# Patient Record
Sex: FEMALE | Race: WHITE | NOT HISPANIC OR LATINO
[De-identification: names, ages, dates, MRNs, and addresses within clinical notes are randomized per-mention and may not be internally consistent; named-entity substitution may affect disease eponyms.]

---

## 2021-09-13 ENCOUNTER — APPOINTMENT (OUTPATIENT)
Dept: OBGYN | Facility: CLINIC | Age: 61
End: 2021-09-13

## 2021-09-24 ENCOUNTER — INPATIENT (INPATIENT)
Facility: HOSPITAL | Age: 61
LOS: 10 days | Discharge: HOME | DRG: 442 | End: 2021-10-05
Attending: INTERNAL MEDICINE | Admitting: INTERNAL MEDICINE
Payer: MEDICARE

## 2021-09-24 VITALS
DIASTOLIC BLOOD PRESSURE: 87 MMHG | SYSTOLIC BLOOD PRESSURE: 128 MMHG | HEART RATE: 104 BPM | HEIGHT: 62 IN | WEIGHT: 147.05 LBS | OXYGEN SATURATION: 97 % | TEMPERATURE: 99 F | RESPIRATION RATE: 20 BRPM

## 2021-09-24 DIAGNOSIS — R74.8 ABNORMAL LEVELS OF OTHER SERUM ENZYMES: ICD-10-CM

## 2021-09-24 LAB
ALBUMIN SERPL ELPH-MCNC: 3.6 G/DL — SIGNIFICANT CHANGE UP (ref 3.3–5)
ALP SERPL-CCNC: 431 U/L — HIGH (ref 40–120)
ALT FLD-CCNC: 1574 U/L — HIGH (ref 10–45)
ANION GAP SERPL CALC-SCNC: 16 MMOL/L — SIGNIFICANT CHANGE UP (ref 5–17)
APAP SERPL-MCNC: <15 UG/ML — SIGNIFICANT CHANGE UP (ref 10–30)
APPEARANCE UR: ABNORMAL
AST SERPL-CCNC: 2330 U/L — HIGH (ref 10–40)
BACTERIA # UR AUTO: NEGATIVE — SIGNIFICANT CHANGE UP
BASOPHILS # BLD AUTO: 0.1 K/UL — SIGNIFICANT CHANGE UP (ref 0–0.2)
BASOPHILS NFR BLD AUTO: 1.2 % — SIGNIFICANT CHANGE UP (ref 0–2)
BILIRUB DIRECT SERPL-MCNC: 6.1 MG/DL — HIGH (ref 0–0.2)
BILIRUB INDIRECT FLD-MCNC: 2.5 MG/DL — HIGH (ref 0.2–1)
BILIRUB SERPL-MCNC: 8.6 MG/DL — HIGH (ref 0.2–1.2)
BILIRUB SERPL-MCNC: 9.3 MG/DL — HIGH (ref 0.2–1.2)
BILIRUB UR-MCNC: ABNORMAL
BUN SERPL-MCNC: 5 MG/DL — LOW (ref 7–23)
CALCIUM SERPL-MCNC: 8.7 MG/DL — SIGNIFICANT CHANGE UP (ref 8.4–10.5)
CHLORIDE SERPL-SCNC: 96 MMOL/L — SIGNIFICANT CHANGE UP (ref 96–108)
CO2 SERPL-SCNC: 21 MMOL/L — LOW (ref 22–31)
COLOR SPEC: ABNORMAL
CREAT SERPL-MCNC: 0.56 MG/DL — SIGNIFICANT CHANGE UP (ref 0.5–1.3)
DIFF PNL FLD: NEGATIVE — SIGNIFICANT CHANGE UP
EOSINOPHIL # BLD AUTO: 0.13 K/UL — SIGNIFICANT CHANGE UP (ref 0–0.5)
EOSINOPHIL NFR BLD AUTO: 1.6 % — SIGNIFICANT CHANGE UP (ref 0–6)
EPI CELLS # UR: 1 /HPF — SIGNIFICANT CHANGE UP
GLUCOSE SERPL-MCNC: 89 MG/DL — SIGNIFICANT CHANGE UP (ref 70–99)
GLUCOSE UR QL: NEGATIVE — SIGNIFICANT CHANGE UP
HCT VFR BLD CALC: 42.1 % — SIGNIFICANT CHANGE UP (ref 34.5–45)
HGB BLD-MCNC: 14.9 G/DL — SIGNIFICANT CHANGE UP (ref 11.5–15.5)
HYALINE CASTS # UR AUTO: 0 /LPF — SIGNIFICANT CHANGE UP (ref 0–2)
IMM GRANULOCYTES NFR BLD AUTO: 0.4 % — SIGNIFICANT CHANGE UP (ref 0–1.5)
KETONES UR-MCNC: NEGATIVE — SIGNIFICANT CHANGE UP
LEUKOCYTE ESTERASE UR-ACNC: NEGATIVE — SIGNIFICANT CHANGE UP
LYMPHOCYTES # BLD AUTO: 1.8 K/UL — SIGNIFICANT CHANGE UP (ref 1–3.3)
LYMPHOCYTES # BLD AUTO: 22.2 % — SIGNIFICANT CHANGE UP (ref 13–44)
MCHC RBC-ENTMCNC: 29.7 PG — SIGNIFICANT CHANGE UP (ref 27–34)
MCHC RBC-ENTMCNC: 35.4 GM/DL — SIGNIFICANT CHANGE UP (ref 32–36)
MCV RBC AUTO: 83.9 FL — SIGNIFICANT CHANGE UP (ref 80–100)
MONOCYTES # BLD AUTO: 0.74 K/UL — SIGNIFICANT CHANGE UP (ref 0–0.9)
MONOCYTES NFR BLD AUTO: 9.1 % — SIGNIFICANT CHANGE UP (ref 2–14)
NEUTROPHILS # BLD AUTO: 5.32 K/UL — SIGNIFICANT CHANGE UP (ref 1.8–7.4)
NEUTROPHILS NFR BLD AUTO: 65.5 % — SIGNIFICANT CHANGE UP (ref 43–77)
NITRITE UR-MCNC: NEGATIVE — SIGNIFICANT CHANGE UP
NRBC # BLD: 0 /100 WBCS — SIGNIFICANT CHANGE UP (ref 0–0)
PH UR: 6.5 — SIGNIFICANT CHANGE UP (ref 5–8)
PLATELET # BLD AUTO: 209 K/UL — SIGNIFICANT CHANGE UP (ref 150–400)
POTASSIUM SERPL-MCNC: 3 MMOL/L — LOW (ref 3.5–5.3)
POTASSIUM SERPL-SCNC: 3 MMOL/L — LOW (ref 3.5–5.3)
PROT SERPL-MCNC: 7.4 G/DL — SIGNIFICANT CHANGE UP (ref 6–8.3)
PROT UR-MCNC: ABNORMAL
RBC # BLD: 5.02 M/UL — SIGNIFICANT CHANGE UP (ref 3.8–5.2)
RBC # FLD: 18.4 % — HIGH (ref 10.3–14.5)
RBC CASTS # UR COMP ASSIST: 16 /HPF — HIGH (ref 0–4)
SARS-COV-2 RNA SPEC QL NAA+PROBE: SIGNIFICANT CHANGE UP
SODIUM SERPL-SCNC: 133 MMOL/L — LOW (ref 135–145)
SP GR SPEC: 1.01 — SIGNIFICANT CHANGE UP (ref 1.01–1.02)
UROBILINOGEN FLD QL: NEGATIVE — SIGNIFICANT CHANGE UP
WBC # BLD: 8.12 K/UL — SIGNIFICANT CHANGE UP (ref 3.8–10.5)
WBC # FLD AUTO: 8.12 K/UL — SIGNIFICANT CHANGE UP (ref 3.8–10.5)
WBC UR QL: 2 /HPF — SIGNIFICANT CHANGE UP (ref 0–5)

## 2021-09-24 PROCEDURE — 74176 CT ABD & PELVIS W/O CONTRAST: CPT | Mod: 26,MA

## 2021-09-24 RX ORDER — POTASSIUM CHLORIDE 20 MEQ
40 PACKET (EA) ORAL ONCE
Refills: 0 | Status: COMPLETED | OUTPATIENT
Start: 2021-09-24 | End: 2021-09-24

## 2021-09-24 RX ORDER — LEVOTHYROXINE SODIUM 125 MCG
25 TABLET ORAL DAILY
Refills: 0 | Status: DISCONTINUED | OUTPATIENT
Start: 2021-09-24 | End: 2021-10-05

## 2021-09-24 RX ADMIN — Medication 40 MILLIEQUIVALENT(S): at 17:03

## 2021-09-24 NOTE — H&P ADULT - HISTORY OF PRESENT ILLNESS
60F with PMH of chronic lower back pain 2/2 degenerative changes, chronic smoker, h/o COPD, noncompliant with treatment, had a CT C/A/P done in 4/2021 2/2 chronic pulmonary nodules and right adrenal adenoma monitoring.  ptn states for the past 2 weeks her urine is brown, she feels nauseous, has diffuse abdominal and back pain, denies dysuria. states she has fevers but hasnt checked her temps. states she has chills  One episode of NBNB emesis 2 weeks ago. Reports constipation with occasional soft stools. never had a colonoscopy, refused COVID vaccine, has a covid swab today at an urgi center: negative. Denies taking Tylenol.    Denies chest pain, leg swelling or pain.

## 2021-09-24 NOTE — ED PROVIDER NOTE - ATTENDING CONTRIBUTION TO CARE
pt is a 61 y/o female here for intermittent back pains with h/o herniated disc here with radiation of pain to rlq, intermittent not related to movement with dark urine, ua nl at urgent care, no n/v/d/f/uti sts, ct ordered, labs, ua, benign abd at this time.

## 2021-09-24 NOTE — ED ADULT NURSE NOTE - OBJECTIVE STATEMENT
59y/o female aaox4 h/o emphysema, HLD, hypothyroidism  presents to the ED ambulatory w/ cane  from home c/o brown urine, fever, b/l flank pain  . Pt states that about 2 week started w/ b/l flank pain  w/radiation to the b/l groins, pt thought that she has UTI and treat herself at home, but the pain , intermittent brown urine ( no blood) and, fever, made her go to  today  for evaluations, where tested urine w/negative results and rcc pt to came to ED for further evaluations, Pt reports having fever, . Denies painful/burning urinations, n/v, weakness, abd pain, diarrhea/constipation, numbness/tingling, in no respiratory distress, no CP, Safety and comfort measures initiated- bed placed in lowest position and side rails raised. Pt oriented to call bell system.

## 2021-09-24 NOTE — ED ADULT NURSE REASSESSMENT NOTE - NS ED NURSE REASSESS COMMENT FT1
Received report from JORDYN Sanchez. Pt is awake and alert, resting comfortably in stretcher, speaking in full coherent sentences. Pt denies CP, SOB, fevers, chills, N/V/D, HA, vision changes. Pt provided with food & drink. Pt completed MRI paperwork, completed forms given to [employer]. Comfort & safety provided.

## 2021-09-24 NOTE — ED ADULT NURSE NOTE - NSIMPLEMENTINTERV_GEN_ALL_ED
Implemented All Fall Risk Interventions:  [employer] to call system. Call bell, personal items and telephone within reach. Instruct patient to call for assistance. Room bathroom lighting operational. Non-slip footwear when patient is off stretcher. Physically safe environment: no spills, clutter or unnecessary equipment. Stretcher in lowest position, wheels locked, appropriate side rails in place. Provide visual cue, wrist band, yellow gown, etc. Monitor gait and stability. Monitor for mental status changes and reorient to person, place, and time. Review medications for side effects contributing to fall risk. Reinforce activity limits and safety measures with patient and family.

## 2021-09-24 NOTE — H&P ADULT - NSHPPHYSICALEXAM_GEN_ALL_CORE
T(F): 99.1 (09-24-21 @ 14:29), Max: 99.1 (09-24-21 @ 12:53)  HR: 84 (09-24-21 @ 17:56) (80 - 104)  BP: 132/80 (09-24-21 @ 17:56) (125/83 - 132/80)  RR: 16 (09-24-21 @ 17:56) (16 - 20)  SpO2: 100% (09-24-21 @ 17:56) (97% - 100%)    PHYSICAL EXAM:  GENERAL: NAD, well-developed  HEAD:  Atraumatic, Normocephalic  EYES: EOMI, PERRLA, conjunctiva and sclera clear  NECK: Supple, No JVD  CHEST/LUNG: Clear to auscultation bilaterally; No wheeze  HEART: Regular rate and rhythm; No murmurs, rubs, or gallops  ABDOMEN: Soft, Nontender, Nondistended; Bowel sounds present  EXTREMITIES:  2+ Peripheral Pulses, No clubbing, cyanosis, or edema  PSYCH: AAOx3  NEUROLOGY: non-focal  SKIN: No rashes or lesions

## 2021-09-24 NOTE — ED PROVIDER NOTE - NS ED ROS FT
REVIEW OF SYSTEMS:  CONSTITUTIONAL: No weakness, fevers or chills  EYES/ENT: No visual changes;  No vertigo or throat pain   NECK: No pain or stiffness  RESPIRATORY: No cough, wheezing, +intermittent shortness of breath  CARDIOVASCULAR: No chest pain or palpitations  GASTROINTESTINAL: +lower abdominal pain. No epigastric pain. +nausea, vomiting, no hematemesis; +constipation. No melena or hematochezia.  GENITOURINARY: +dark urine, No dysuria, frequency or hematuria  NEUROLOGICAL: No numbness or weakness  ENDOCRINE: No polyuria, polydipsia, heat or cold intolerance  SKIN: No itching, rashes

## 2021-09-24 NOTE — H&P ADULT - NSICDXPASTMEDICALHX_GEN_ALL_CORE_FT
PAST MEDICAL HISTORY:  Adrenal adenoma     COPD not affecting current episode of care     DJD (degenerative joint disease)     Hypothyroidism     Low back pain     Nicotine addiction

## 2021-09-24 NOTE — ED PROVIDER NOTE - OBJECTIVE STATEMENT
Patient is a 60F with PMH of chronic lower back pain 2/2 degenerative changes, now presenting to the ED with a 2 week history of bilateral lower back pain radiating to the groin as well and brown colored urine. She reports chronic bilateral lower back pain that radiates to groin bilaterally, not exacerbated by stretching/movement. Reports 2 week history of brown colored urine with brown/yellow color noted on toilet paper. Reports bilateral lower abdominal pain. Reports occasional one drop of blood in urine. One episode of NBNB emesis 2 weeks ago. Reports constipation with occasional soft stools. Denies dysuria. Denies fever, chills, chest pain, leg swelling or pain.

## 2021-09-24 NOTE — H&P ADULT - ASSESSMENT
61 yo woman with c/o painless jaundice, has chronic LBP  work up c/w hepatocellular dz with acute hepatitis of unclear etiology  ct A/P c/w cirrhosis, but nl platelets  get mrcp , get PT/INR  will also MRI LS spine  place on Neuronitn   dvt ppx w sc Lovenox

## 2021-09-24 NOTE — PROGRESS NOTE ADULT - SUBJECTIVE AND OBJECTIVE BOX
RIC [id number]  60yFemale  T(C): 37.3 (09-24-21 @ 14:29), Max: 37.3 (09-24-21 @ 12:53)  HR: 80 (09-24-21 @ 17:19) (80 - 104)  BP: 125/83 (09-24-21 @ 17:19) (125/83 - 128/89)  RR: 18 (09-24-21 @ 17:19) (18 - 20)  SpO2: 100% (09-24-21 @ 17:19) (97% - 100%)  Wt(kg): --

## 2021-09-24 NOTE — ED PROVIDER NOTE - PHYSICAL EXAMINATION
T(C): 37.3 (09-24-21 @ 14:29), Max: 37.3 (09-24-21 @ 12:53)  HR: 88 (09-24-21 @ 14:29) (88 - 104)  BP: 128/89 (09-24-21 @ 14:29) (128/87 - 128/89)  RR: 18 (09-24-21 @ 14:29) (18 - 20)  SpO2: 100% (09-24-21 @ 14:29) (97% - 100%)    PHYSICAL EXAM:  GENERAL: NAD, resting in bed  HEAD:  Atraumatic, Normocephalic  EYES: EOMI, PERRLA, conjunctiva and sclera clear  ENMT: No tonsillar erythema, exudates, or enlargement; Moist mucous membranes, Good dentition, No lesions  NECK: Supple, No JVD  CHEST/LUNG: Clear to auscultation bilaterally; No rales, rhonchi, wheezing  HEART: Regular rate and rhythm; No murmurs, rubs, or gallops  ABDOMEN: Soft, +moderate tenderness to palpation lower abdomen b/l, Nondistended; Bowel sounds present  EXTREMITIES:  2+ Peripheral Pulses, No clubbing, cyanosis, or edema  LYMPH: No lymphadenopathy noted  SKIN: No rashes or lesions  NERVOUS SYSTEM:  Alert & Oriented X3, No focal deficits.

## 2021-09-25 LAB
ALBUMIN SERPL ELPH-MCNC: 3.1 G/DL — LOW (ref 3.3–5)
ALP SERPL-CCNC: 386 U/L — HIGH (ref 40–120)
ALT FLD-CCNC: 1393 U/L — HIGH (ref 10–45)
ANION GAP SERPL CALC-SCNC: 13 MMOL/L — SIGNIFICANT CHANGE UP (ref 5–17)
AST SERPL-CCNC: 2309 U/L — HIGH (ref 10–40)
BILIRUB DIRECT SERPL-MCNC: 6.6 MG/DL — HIGH (ref 0–0.2)
BILIRUB INDIRECT FLD-MCNC: 2.2 MG/DL — HIGH (ref 0.2–1)
BILIRUB SERPL-MCNC: 8.8 MG/DL — HIGH (ref 0.2–1.2)
BUN SERPL-MCNC: 7 MG/DL — SIGNIFICANT CHANGE UP (ref 7–23)
CALCIUM SERPL-MCNC: 8.7 MG/DL — SIGNIFICANT CHANGE UP (ref 8.4–10.5)
CHLORIDE SERPL-SCNC: 101 MMOL/L — SIGNIFICANT CHANGE UP (ref 96–108)
CO2 SERPL-SCNC: 21 MMOL/L — LOW (ref 22–31)
CREAT SERPL-MCNC: 0.57 MG/DL — SIGNIFICANT CHANGE UP (ref 0.5–1.3)
CULTURE RESULTS: SIGNIFICANT CHANGE UP
GLUCOSE SERPL-MCNC: 67 MG/DL — LOW (ref 70–99)
HAV IGM SER-ACNC: SIGNIFICANT CHANGE UP
HBV CORE IGM SER-ACNC: SIGNIFICANT CHANGE UP
HBV SURFACE AG SER-ACNC: SIGNIFICANT CHANGE UP
HCT VFR BLD CALC: 39 % — SIGNIFICANT CHANGE UP (ref 34.5–45)
HCV AB S/CO SERPL IA: 0.21 S/CO — SIGNIFICANT CHANGE UP (ref 0–0.99)
HCV AB SERPL-IMP: SIGNIFICANT CHANGE UP
HGB BLD-MCNC: 13.2 G/DL — SIGNIFICANT CHANGE UP (ref 11.5–15.5)
INR BLD: 1.21 RATIO — HIGH (ref 0.88–1.16)
MCHC RBC-ENTMCNC: 28.9 PG — SIGNIFICANT CHANGE UP (ref 27–34)
MCHC RBC-ENTMCNC: 33.8 GM/DL — SIGNIFICANT CHANGE UP (ref 32–36)
MCV RBC AUTO: 85.3 FL — SIGNIFICANT CHANGE UP (ref 80–100)
NRBC # BLD: 0 /100 WBCS — SIGNIFICANT CHANGE UP (ref 0–0)
PLATELET # BLD AUTO: 192 K/UL — SIGNIFICANT CHANGE UP (ref 150–400)
POTASSIUM SERPL-MCNC: 3.5 MMOL/L — SIGNIFICANT CHANGE UP (ref 3.5–5.3)
POTASSIUM SERPL-SCNC: 3.5 MMOL/L — SIGNIFICANT CHANGE UP (ref 3.5–5.3)
PROT SERPL-MCNC: 6.4 G/DL — SIGNIFICANT CHANGE UP (ref 6–8.3)
PROTHROM AB SERPL-ACNC: 14.4 SEC — HIGH (ref 10.6–13.6)
RBC # BLD: 4.57 M/UL — SIGNIFICANT CHANGE UP (ref 3.8–5.2)
RBC # FLD: 18.6 % — HIGH (ref 10.3–14.5)
SODIUM SERPL-SCNC: 135 MMOL/L — SIGNIFICANT CHANGE UP (ref 135–145)
SPECIMEN SOURCE: SIGNIFICANT CHANGE UP
WBC # BLD: 6.33 K/UL — SIGNIFICANT CHANGE UP (ref 3.8–10.5)
WBC # FLD AUTO: 6.33 K/UL — SIGNIFICANT CHANGE UP (ref 3.8–10.5)

## 2021-09-25 PROCEDURE — 74181 MRI ABDOMEN W/O CONTRAST: CPT | Mod: 26

## 2021-09-25 RX ORDER — CHLORHEXIDINE GLUCONATE 213 G/1000ML
1 SOLUTION TOPICAL DAILY
Refills: 0 | Status: DISCONTINUED | OUTPATIENT
Start: 2021-09-25 | End: 2021-10-05

## 2021-09-25 RX ORDER — INFLUENZA VIRUS VACCINE 15; 15; 15; 15 UG/.5ML; UG/.5ML; UG/.5ML; UG/.5ML
0.5 SUSPENSION INTRAMUSCULAR ONCE
Refills: 0 | Status: DISCONTINUED | OUTPATIENT
Start: 2021-09-25 | End: 2021-10-05

## 2021-09-25 RX ORDER — GABAPENTIN 400 MG/1
100 CAPSULE ORAL THREE TIMES A DAY
Refills: 0 | Status: DISCONTINUED | OUTPATIENT
Start: 2021-09-25 | End: 2021-10-01

## 2021-09-25 RX ORDER — ALPRAZOLAM 0.25 MG
0.25 TABLET ORAL ONCE
Refills: 0 | Status: DISCONTINUED | OUTPATIENT
Start: 2021-09-25 | End: 2021-09-25

## 2021-09-25 RX ORDER — ENOXAPARIN SODIUM 100 MG/ML
40 INJECTION SUBCUTANEOUS DAILY
Refills: 0 | Status: DISCONTINUED | OUTPATIENT
Start: 2021-09-25 | End: 2021-09-27

## 2021-09-25 RX ADMIN — GABAPENTIN 100 MILLIGRAM(S): 400 CAPSULE ORAL at 21:21

## 2021-09-25 RX ADMIN — Medication 25 MICROGRAM(S): at 05:19

## 2021-09-25 RX ADMIN — CHLORHEXIDINE GLUCONATE 1 APPLICATION(S): 213 SOLUTION TOPICAL at 13:58

## 2021-09-25 RX ADMIN — Medication 0.25 MILLIGRAM(S): at 11:27

## 2021-09-25 NOTE — PROGRESS NOTE ADULT - SUBJECTIVE AND OBJECTIVE BOX
Patient is a 60y old  Female who presents with a chief complaint of hepatitis, jaundice (25 Sep 2021 09:35)      SUBJECTIVE / OVERNIGHT EVENTS: ptn remains jaundiced, serology for autoimmune hepatitis sent, denies pruritis, c/o constipation. GI following    MEDICATIONS  (STANDING):  chlorhexidine 2% Cloths 1 Application(s) Topical daily  influenza   Vaccine 0.5 milliLiter(s) IntraMuscular once  levothyroxine 25 MICROGram(s) Oral daily    MEDICATIONS  (PRN):      Vital Signs Last 24 Hrs  T(F): 97.4 (21 @ 14:03), Max: 99.5 (21 @ 00:28)  HR: 85 (21 @ 14:03) (77 - 85)  BP: 112/79 (21 @ 14:03) (99/62 - 112/79)  RR: 20 (21 @ 14:03) (18 - 20)  SpO2: 98% (21 @ 14:03) (94% - 98%)  Telemetry:   CAPILLARY BLOOD GLUCOSE        I&O's Summary      PHYSICAL EXAM:  GENERAL: NAD, well-developed  HEAD:  Atraumatic, Normocephalic  EYES: EOMI, PERRLA, conjunctiva and sclera clear  NECK: Supple, No JVD  CHEST/LUNG: Clear to auscultation bilaterally; No wheeze  HEART: Regular rate and rhythm; No murmurs, rubs, or gallops  ABDOMEN: Soft, Nontender, Nondistended; Bowel sounds present  EXTREMITIES:  2+ Peripheral Pulses, No clubbing, cyanosis, or edema  PSYCH: AAOx3  NEUROLOGY: non-focal  SKIN: No rashes or lesions    LABS:                        13.2   6.33  )-----------( 192      ( 25 Sep 2021 07:16 )             39.0         135  |  101  |  7   ----------------------------<  67<L>  3.5   |  21<L>  |  0.57    Ca    8.7      25 Sep 2021 07:16    TPro  6.4  /  Alb  3.1<L>  /  TBili  8.8<H>  /  DBili  6.6<H>  /  AST  2309<H>  /  ALT  1393<H>  /  AlkPhos  386<H>      PT/INR - ( 25 Sep 2021 07:16 )   PT: 14.4 sec;   INR: 1.21 ratio               Urinalysis Basic - ( 24 Sep 2021 15:23 )    Color: Dark Yellow / Appearance: Slightly Turbid / S.011 / pH: x  Gluc: x / Ketone: Negative  / Bili: Small / Urobili: Negative   Blood: x / Protein: Trace / Nitrite: Negative   Leuk Esterase: Negative / RBC: 16 /hpf / WBC 2 /HPF   Sq Epi: x / Non Sq Epi: 1 /hpf / Bacteria: Negative        RADIOLOGY & ADDITIONAL TESTS:    Imaging Personally Reviewed:    Consultant(s) Notes Reviewed:      Care Discussed with Consultants/Other Providers:

## 2021-09-25 NOTE — PROGRESS NOTE ADULT - SUBJECTIVE AND OBJECTIVE BOX
Patient is a 60y Female     Patient is a 60y old  Female who presents with a chief complaint of hepatitis, jaundice (24 Sep 2021 18:33)      HPI:   60F with PMH of chronic lower back pain 2/2 degenerative changes, chronic smoker, h/o COPD, noncompliant with treatment, had a CT C/A/P done in 4/2021 2/2 chronic pulmonary nodules and right adrenal adenoma monitoring.  ptn states for the past 2 weeks her urine is brown, she feels nauseous, has diffuse abdominal and back pain, denies dysuria. states she has fevers but hasnt checked her temps. states she has chills  One episode of NBNB emesis 2 weeks ago. Reports constipation with occasional soft stools. never had a colonoscopy, refused COVID vaccine, [facility]. Denies taking Tylenol.    Denies chest pain, leg swelling or pain. (24 Sep 2021 18:33)      PAST MEDICAL & SURGICAL HISTORY:  Hypothyroidism    COPD not affecting current episode of care    Adrenal adenoma    Low back pain    DJD (degenerative joint disease)    Nicotine addiction        MEDICATIONS  (STANDING):  ALPRAZolam 0.25 milliGRAM(s) Oral once  chlorhexidine 2% Cloths 1 Application(s) Topical daily  influenza   Vaccine 0.5 milliLiter(s) IntraMuscular once  levothyroxine 25 MICROGram(s) Oral daily      Allergies    penicillin (Rash)    Intolerances        SOCIAL HISTORY:  Denies ETOh,Smoking,     FAMILY HISTORY:      REVIEW OF SYSTEMS:    CONSTITUTIONAL: No weakness, fevers or chills  EYES/ENT: No visual changes;  No vertigo or throat pain   NECK: No pain or stiffness  RESPIRATORY: No cough, wheezing, hemoptysis; No shortness of breath  CARDIOVASCULAR: No chest pain or palpitations  GASTROINTESTINAL: No abdominal or epigastric pain. No nausea, vomiting, or hematemesis; No diarrhea or constipation. No melena or hematochezia.  GENITOURINARY: No dysuria, frequency or hematuria  NEUROLOGICAL: No numbness or weakness  SKIN: No itching, burning, rashes, or lesions   All other review of systems is negative unless indicated above.    VITAL:  T(C): , Max: 37.5 (09-25-21 @ 00:28)  T(F): , Max: 99.5 (09-25-21 @ 00:28)  HR: 77 (09-25-21 @ 08:34)  BP: 107/69 (09-25-21 @ 08:34)  BP(mean): --  RR: 18 (09-25-21 @ 08:34)  SpO2: 94% (09-25-21 @ 08:34)  Wt(kg): --    I and O's:    Height (cm): 157.5 (09-24 @ 12:53)  Weight (kg): 66.7 (09-24 @ 12:53)  BMI (kg/m2): 26.9 (09-24 @ 12:53)  BSA (m2): 1.68 (09-24 @ 12:53)    PHYSICAL EXAM:    Constitutional: NAD  HEENT: PERRLA,   Neck: No JVD  Respiratory: CTA B/L  Cardiovascular: S1 and S2  Gastrointestinal: BS+, soft, NT/ND  Extremities: No peripheral edema  Neurological: A/O x 3, no focal deficits  Psychiatric: Normal mood, normal affect  : No Cee  Skin: No rashes  Access: Not applicable  Back: No CVA tenderness    LABS:                        13.2   6.33  )-----------( 192      ( 25 Sep 2021 07:16 )             39.0     09-25    135  |  101  |  7   ----------------------------<  67<L>  3.5   |  21<L>  |  0.57    Ca    8.7      25 Sep 2021 07:16    TPro  6.4  /  Alb  3.1<L>  /  TBili  8.8<H>  /  DBili  6.6<H>  /  AST  2309<H>  /  ALT  1393<H>  /  AlkPhos  386<H>  09-25          RADIOLOGY & ADDITIONAL STUDIES:

## 2021-09-26 LAB
ALBUMIN SERPL ELPH-MCNC: 2.9 G/DL — LOW (ref 3.3–5)
ALP SERPL-CCNC: 395 U/L — HIGH (ref 40–120)
ALT FLD-CCNC: 1422 U/L — HIGH (ref 10–45)
AMMONIA BLD-MCNC: 49 UMOL/L — SIGNIFICANT CHANGE UP (ref 11–55)
ANA PAT FLD IF-IMP: ABNORMAL
ANA TITR SER: ABNORMAL
ANION GAP SERPL CALC-SCNC: 13 MMOL/L — SIGNIFICANT CHANGE UP (ref 5–17)
AST SERPL-CCNC: 2349 U/L — HIGH (ref 10–40)
BILIRUB SERPL-MCNC: 9.1 MG/DL — HIGH (ref 0.2–1.2)
BUN SERPL-MCNC: 8 MG/DL — SIGNIFICANT CHANGE UP (ref 7–23)
CALCIUM SERPL-MCNC: 8.6 MG/DL — SIGNIFICANT CHANGE UP (ref 8.4–10.5)
CHLORIDE SERPL-SCNC: 102 MMOL/L — SIGNIFICANT CHANGE UP (ref 96–108)
CO2 SERPL-SCNC: 22 MMOL/L — SIGNIFICANT CHANGE UP (ref 22–31)
COVID-19 SPIKE DOMAIN AB INTERP: NEGATIVE — SIGNIFICANT CHANGE UP
COVID-19 SPIKE DOMAIN ANTIBODY RESULT: 0.4 U/ML — SIGNIFICANT CHANGE UP
CREAT SERPL-MCNC: 0.65 MG/DL — SIGNIFICANT CHANGE UP (ref 0.5–1.3)
GLUCOSE SERPL-MCNC: 82 MG/DL — SIGNIFICANT CHANGE UP (ref 70–99)
HCT VFR BLD CALC: 39.8 % — SIGNIFICANT CHANGE UP (ref 34.5–45)
HCV AB S/CO SERPL IA: 0.21 S/CO — SIGNIFICANT CHANGE UP (ref 0–0.99)
HCV AB SERPL-IMP: SIGNIFICANT CHANGE UP
HGB BLD-MCNC: 13.2 G/DL — SIGNIFICANT CHANGE UP (ref 11.5–15.5)
MCHC RBC-ENTMCNC: 28.8 PG — SIGNIFICANT CHANGE UP (ref 27–34)
MCHC RBC-ENTMCNC: 33.2 GM/DL — SIGNIFICANT CHANGE UP (ref 32–36)
MCV RBC AUTO: 86.9 FL — SIGNIFICANT CHANGE UP (ref 80–100)
NRBC # BLD: 0 /100 WBCS — SIGNIFICANT CHANGE UP (ref 0–0)
PLATELET # BLD AUTO: 200 K/UL — SIGNIFICANT CHANGE UP (ref 150–400)
POTASSIUM SERPL-MCNC: 3.8 MMOL/L — SIGNIFICANT CHANGE UP (ref 3.5–5.3)
POTASSIUM SERPL-SCNC: 3.8 MMOL/L — SIGNIFICANT CHANGE UP (ref 3.5–5.3)
PROT SERPL-MCNC: 6.3 G/DL — SIGNIFICANT CHANGE UP (ref 6–8.3)
RBC # BLD: 4.58 M/UL — SIGNIFICANT CHANGE UP (ref 3.8–5.2)
RBC # FLD: 19.2 % — HIGH (ref 10.3–14.5)
SARS-COV-2 IGG+IGM SERPL QL IA: 0.4 U/ML — SIGNIFICANT CHANGE UP
SARS-COV-2 IGG+IGM SERPL QL IA: NEGATIVE — SIGNIFICANT CHANGE UP
SODIUM SERPL-SCNC: 137 MMOL/L — SIGNIFICANT CHANGE UP (ref 135–145)
WBC # BLD: 6.36 K/UL — SIGNIFICANT CHANGE UP (ref 3.8–10.5)
WBC # FLD AUTO: 6.36 K/UL — SIGNIFICANT CHANGE UP (ref 3.8–10.5)

## 2021-09-26 PROCEDURE — 72148 MRI LUMBAR SPINE W/O DYE: CPT | Mod: 26

## 2021-09-26 RX ORDER — LACTULOSE 10 G/15ML
30 SOLUTION ORAL EVERY 8 HOURS
Refills: 0 | Status: DISCONTINUED | OUTPATIENT
Start: 2021-09-26 | End: 2021-10-01

## 2021-09-26 RX ORDER — ALPRAZOLAM 0.25 MG
0.25 TABLET ORAL ONCE
Refills: 0 | Status: DISCONTINUED | OUTPATIENT
Start: 2021-09-26 | End: 2021-09-26

## 2021-09-26 RX ORDER — SENNA PLUS 8.6 MG/1
2 TABLET ORAL AT BEDTIME
Refills: 0 | Status: DISCONTINUED | OUTPATIENT
Start: 2021-09-26 | End: 2021-10-05

## 2021-09-26 RX ORDER — POLYETHYLENE GLYCOL 3350 17 G/17G
17 POWDER, FOR SOLUTION ORAL ONCE
Refills: 0 | Status: COMPLETED | OUTPATIENT
Start: 2021-09-26 | End: 2021-09-26

## 2021-09-26 RX ORDER — POLYETHYLENE GLYCOL 3350 17 G/17G
17 POWDER, FOR SOLUTION ORAL DAILY
Refills: 0 | Status: DISCONTINUED | OUTPATIENT
Start: 2021-09-26 | End: 2021-10-05

## 2021-09-26 RX ADMIN — POLYETHYLENE GLYCOL 3350 17 GRAM(S): 17 POWDER, FOR SOLUTION ORAL at 05:45

## 2021-09-26 RX ADMIN — POLYETHYLENE GLYCOL 3350 17 GRAM(S): 17 POWDER, FOR SOLUTION ORAL at 13:35

## 2021-09-26 RX ADMIN — Medication 0.25 MILLIGRAM(S): at 18:15

## 2021-09-26 RX ADMIN — Medication 25 MICROGRAM(S): at 05:25

## 2021-09-26 RX ADMIN — SENNA PLUS 2 TABLET(S): 8.6 TABLET ORAL at 21:31

## 2021-09-26 RX ADMIN — GABAPENTIN 100 MILLIGRAM(S): 400 CAPSULE ORAL at 21:31

## 2021-09-26 RX ADMIN — GABAPENTIN 100 MILLIGRAM(S): 400 CAPSULE ORAL at 05:25

## 2021-09-26 RX ADMIN — CHLORHEXIDINE GLUCONATE 1 APPLICATION(S): 213 SOLUTION TOPICAL at 12:01

## 2021-09-26 RX ADMIN — LACTULOSE 30 GRAM(S): 10 SOLUTION ORAL at 21:31

## 2021-09-26 RX ADMIN — GABAPENTIN 100 MILLIGRAM(S): 400 CAPSULE ORAL at 13:35

## 2021-09-26 NOTE — PROGRESS NOTE ADULT - SUBJECTIVE AND OBJECTIVE BOX
Patient is a 60y Female     Patient is a 60y old  Female who presents with a chief complaint of hepatitis, jaundice (25 Sep 2021 18:35)      HPI:   60F with PMH of chronic lower back pain 2/2 degenerative changes, chronic smoker, h/o COPD, noncompliant with treatment, had a CT C/A/P done in 4/2021 2/2 chronic pulmonary nodules and right adrenal adenoma monitoring.  ptn states for the past 2 weeks her urine is brown, she feels nauseous, has diffuse abdominal and back pain, denies dysuria. states she has fevers but hasnt checked her temps. states she has chills  One episode of NBNB emesis 2 weeks ago. Reports constipation with occasional soft stools. never had a colonoscopy, refused COVID vaccine, [facility]: [facility]. Denies taking Tylenol.    Denies chest pain, leg swelling or pain. (24 Sep 2021 18:33)      PAST MEDICAL & SURGICAL HISTORY:  Hypothyroidism    COPD not affecting current episode of care    Adrenal adenoma    Low back pain    DJD (degenerative joint disease)    Nicotine addiction        MEDICATIONS  (STANDING):  chlorhexidine 2% Cloths 1 Application(s) Topical daily  enoxaparin Injectable 40 milliGRAM(s) SubCutaneous daily  gabapentin 100 milliGRAM(s) Oral three times a day  influenza   Vaccine 0.5 milliLiter(s) IntraMuscular once  levothyroxine 25 MICROGram(s) Oral daily  senna 2 Tablet(s) Oral at bedtime      Allergies    penicillin (Rash)    Intolerances        SOCIAL HISTORY:  Denies ETOh,Smoking,     FAMILY HISTORY:      REVIEW OF SYSTEMS:    CONSTITUTIONAL: No weakness, fevers or chills  EYES/ENT: No visual changes;  No vertigo or throat pain   NECK: No pain or stiffness  RESPIRATORY: No cough, wheezing, hemoptysis; No shortness of breath  CARDIOVASCULAR: No chest pain or palpitations  GASTROINTESTINAL: No abdominal or epigastric pain. No nausea, vomiting, or hematemesis; No diarrhea or constipation. No melena or hematochezia.  GENITOURINARY: No dysuria, frequency or hematuria  NEUROLOGICAL: No numbness or weakness  SKIN: No itching, burning, rashes, or lesions   All other review of systems is negative unless indicated above.    VITAL:  T(C): , Max: 37.2 (09-26-21 @ 00:10)  T(F): , Max: 99 (09-26-21 @ 00:10)  HR: 64 (09-26-21 @ 08:22)  BP: 101/65 (09-26-21 @ 08:22)  BP(mean): --  RR: 18 (09-26-21 @ 08:22)  SpO2: 95% (09-26-21 @ 08:22)  Wt(kg): --    I and O's:    09-25 @ 07:01  -  09-26 @ 07:00  --------------------------------------------------------  IN: 80 mL / OUT: 0 mL / NET: 80 mL          PHYSICAL EXAM:    Constitutional: NAD  HEENT: PERRLA,   Neck: No JVD  Respiratory: CTA B/L  Cardiovascular: S1 and S2  Gastrointestinal: BS+, soft, NT/ND  Extremities: No peripheral edema  Neurological: A/O x 3, no focal deficits  Psychiatric: Normal mood, normal affect  : No Cee  Skin: No rashes  Access: Not applicable  Back: No CVA tenderness    LABS:                        13.2   6.36  )-----------( 200      ( 26 Sep 2021 07:23 )             39.8     09-26    137  |  102  |  8   ----------------------------<  82  3.8   |  22  |  0.65    Ca    8.6      26 Sep 2021 07:15    TPro  6.3  /  Alb  2.9<L>  /  TBili  9.1<H>  /  DBili  x   /  AST  2349<H>  /  ALT  1422<H>  /  AlkPhos  395<H>  09-26          RADIOLOGY & ADDITIONAL STUDIES:

## 2021-09-26 NOTE — PROGRESS NOTE ADULT - SUBJECTIVE AND OBJECTIVE BOX
Patient is a 60y old  Female who presents with a chief complaint of hepatitis, jaundice (26 Sep 2021 08:35)      SUBJECTIVE / OVERNIGHT EVENTS: awaiting serology on R/O autoimmune hepatitis    MEDICATIONS  (STANDING):  chlorhexidine 2% Cloths 1 Application(s) Topical daily  enoxaparin Injectable 40 milliGRAM(s) SubCutaneous daily  gabapentin 100 milliGRAM(s) Oral three times a day  influenza   Vaccine 0.5 milliLiter(s) IntraMuscular once  levothyroxine 25 MICROGram(s) Oral daily  polyethylene glycol 3350 17 Gram(s) Oral daily  senna 2 Tablet(s) Oral at bedtime    MEDICATIONS  (PRN):      Vital Signs Last 24 Hrs  T(F): 98.7 (21 @ 08:22), Max: 99 (21 @ 00:10)  HR: 64 (21 @ 08:22) (64 - 69)  BP: 101/65 (21 @ 08:22) (101/65 - 101/65)  RR: 18 (21 @ 08:22) (18 - 18)  SpO2: 95% (21 @ 08:22) (95% - 95%)  Telemetry:   CAPILLARY BLOOD GLUCOSE        I&O's Summary    25 Sep 2021 07:01  -  26 Sep 2021 07:00  --------------------------------------------------------  IN: 80 mL / OUT: 0 mL / NET: 80 mL        PHYSICAL EXAM:  GENERAL: NAD, well-developed  HEAD:  Atraumatic, Normocephalic  EYES: EOMI, PERRLA, conjunctiva and sclera clear  NECK: Supple, No JVD  CHEST/LUNG: Clear to auscultation bilaterally; No wheeze  HEART: Regular rate and rhythm; No murmurs, rubs, or gallops  ABDOMEN: Soft, Nontender, Nondistended; Bowel sounds present  EXTREMITIES:  2+ Peripheral Pulses, No clubbing, cyanosis, or edema  PSYCH: AAOx3  NEUROLOGY: non-focal  SKIN: No rashes or lesions    LABS:                        13.2   6.36  )-----------( 200      ( 26 Sep 2021 07:23 )             39.8         137  |  102  |  8   ----------------------------<  82  3.8   |  22  |  0.65    Ca    8.6      26 Sep 2021 07:15    TPro  6.3  /  Alb  2.9<L>  /  TBili  9.1<H>  /  DBili  x   /  AST  2349<H>  /  ALT  1422<H>  /  AlkPhos  395<H>      PT/INR - ( 25 Sep 2021 07:16 )   PT: 14.4 sec;   INR: 1.21 ratio               Urinalysis Basic - ( 24 Sep 2021 15:23 )    Color: Dark Yellow / Appearance: Slightly Turbid / S.011 / pH: x  Gluc: x / Ketone: Negative  / Bili: Small / Urobili: Negative   Blood: x / Protein: Trace / Nitrite: Negative   Leuk Esterase: Negative / RBC: 16 /hpf / WBC 2 /HPF   Sq Epi: x / Non Sq Epi: 1 /hpf / Bacteria: Negative        RADIOLOGY & ADDITIONAL TESTS:    Imaging Personally Reviewed:    Consultant(s) Notes Reviewed:      Care Discussed with Consultants/Other Providers:

## 2021-09-27 LAB
ALBUMIN SERPL ELPH-MCNC: 2.9 G/DL — LOW (ref 3.3–5)
ALP SERPL-CCNC: 389 U/L — HIGH (ref 40–120)
ALT FLD-CCNC: 1506 U/L — HIGH (ref 10–45)
ANION GAP SERPL CALC-SCNC: 14 MMOL/L — SIGNIFICANT CHANGE UP (ref 5–17)
AST SERPL-CCNC: 2400 U/L — HIGH (ref 10–40)
BILIRUB SERPL-MCNC: 10.5 MG/DL — HIGH (ref 0.2–1.2)
BUN SERPL-MCNC: 7 MG/DL — SIGNIFICANT CHANGE UP (ref 7–23)
CALCIUM SERPL-MCNC: 8.5 MG/DL — SIGNIFICANT CHANGE UP (ref 8.4–10.5)
CHLORIDE SERPL-SCNC: 102 MMOL/L — SIGNIFICANT CHANGE UP (ref 96–108)
CO2 SERPL-SCNC: 21 MMOL/L — LOW (ref 22–31)
CREAT SERPL-MCNC: 0.54 MG/DL — SIGNIFICANT CHANGE UP (ref 0.5–1.3)
GLUCOSE SERPL-MCNC: 81 MG/DL — SIGNIFICANT CHANGE UP (ref 70–99)
POTASSIUM SERPL-MCNC: 3.5 MMOL/L — SIGNIFICANT CHANGE UP (ref 3.5–5.3)
POTASSIUM SERPL-SCNC: 3.5 MMOL/L — SIGNIFICANT CHANGE UP (ref 3.5–5.3)
PROT SERPL-MCNC: 6.3 G/DL — SIGNIFICANT CHANGE UP (ref 6–8.3)
SODIUM SERPL-SCNC: 137 MMOL/L — SIGNIFICANT CHANGE UP (ref 135–145)

## 2021-09-27 RX ORDER — PHYTONADIONE (VIT K1) 5 MG
2.5 TABLET ORAL ONCE
Refills: 0 | Status: COMPLETED | OUTPATIENT
Start: 2021-09-27 | End: 2021-09-27

## 2021-09-27 RX ADMIN — POLYETHYLENE GLYCOL 3350 17 GRAM(S): 17 POWDER, FOR SOLUTION ORAL at 12:18

## 2021-09-27 RX ADMIN — LACTULOSE 30 GRAM(S): 10 SOLUTION ORAL at 12:17

## 2021-09-27 RX ADMIN — SENNA PLUS 2 TABLET(S): 8.6 TABLET ORAL at 21:59

## 2021-09-27 RX ADMIN — Medication 25 MICROGRAM(S): at 05:11

## 2021-09-27 RX ADMIN — GABAPENTIN 100 MILLIGRAM(S): 400 CAPSULE ORAL at 21:59

## 2021-09-27 RX ADMIN — GABAPENTIN 100 MILLIGRAM(S): 400 CAPSULE ORAL at 05:11

## 2021-09-27 RX ADMIN — Medication 2.5 MILLIGRAM(S): at 12:18

## 2021-09-27 RX ADMIN — LACTULOSE 30 GRAM(S): 10 SOLUTION ORAL at 22:00

## 2021-09-27 RX ADMIN — LACTULOSE 30 GRAM(S): 10 SOLUTION ORAL at 05:11

## 2021-09-27 RX ADMIN — GABAPENTIN 100 MILLIGRAM(S): 400 CAPSULE ORAL at 12:17

## 2021-09-27 RX ADMIN — CHLORHEXIDINE GLUCONATE 1 APPLICATION(S): 213 SOLUTION TOPICAL at 12:18

## 2021-09-27 NOTE — CONSULT NOTE ADULT - ASSESSMENT
Vascular & Interventional Radiology Brief Consult Note    Evaluate for Procedure: Non-targeted liver biopsy     HPI: 60y Female with cirrhotic appearing liver on recent CT and MRI with elevated FARIDEH suggestive of autoimmune etiology. IR consulted for biopsy confirmation.     Allergies: penicillin (Rash)    Medications (Abx/Cardiac/Anticoagulation/Blood Products)      Data:    T(C): 36.9  HR: 76  BP: 117/77  RR: 18  SpO2: 94%    -WBC 6.36 / HgB 13.2 / Hct 39.8 / Plt 200  -Na 137 / Cl 102 / BUN 7 / Glucose 81  -K 3.5 / CO2 21 / Cr 0.54  -ALT 1506 / Alk Phos 389 / T.Bili 10.5  -INR 1.21 / PTT --    Imagin/25 abd mri and  abd ct reviewed     Assessment/Plan:   60y Female with cirrhotic appearing liver on recent CT and MRI with elevated FARIDEH suggestive of autoimmune etiology. IR consulted for biopsy confirmation.    -Plan for procedure tomorrow, 2021  -Please place IR procedure order under Dr Moises Servin.  -Please order AM CBC, BMP, coags, and type and screen  -NPO at midnight   -Continue holding any anticoagulation  Vascular & Interventional Radiology Brief Consult Note    Evaluate for Procedure: Non-targeted liver biopsy     HPI: 60y Female with cirrhotic appearing liver on recent CT and MRI with elevated FARIDEH suggestive of autoimmune etiology. IR consulted for biopsy confirmation.     Allergies: penicillin (Rash)    Medications (Abx/Cardiac/Anticoagulation/Blood Products)      Data:    T(C): 36.9  HR: 76  BP: 117/77  RR: 18  SpO2: 94%    -WBC 6.36 / HgB 13.2 / Hct 39.8 / Plt 200  -Na 137 / Cl 102 / BUN 7 / Glucose 81  -K 3.5 / CO2 21 / Cr 0.54  -ALT 1506 / Alk Phos 389 / T.Bili 10.5  -INR 1.21 / PTT --    Imagin/25 abd mri and  abd ct reviewed     Assessment/Plan:   60y Female with cirrhotic appearing liver on recent CT and MRI with elevated FARIDEH suggestive of autoimmune etiology. IR consulted for biopsy confirmation.    -Plan for procedure tomorrow, 2021  -Please place IR procedure order under Dr Moises Servin.  -Please order AM CBC, BMP, coags, and type and screen  -NPO at midnight.   -Continue holding any anticoagulation.

## 2021-09-27 NOTE — PROGRESS NOTE ADULT - SUBJECTIVE AND OBJECTIVE BOX
Patient is a 60y old  Female who presents with a chief complaint of hepatitis, jaundice (27 Sep 2021 10:52)      SUBJECTIVE / OVERNIGHT EVENTS: FARIDEH is positive, has nausea, poor appetite, will plan for liver Biopsy and place on budenoside after as per GI    MEDICATIONS  (STANDING):  chlorhexidine 2% Cloths 1 Application(s) Topical daily  gabapentin 100 milliGRAM(s) Oral three times a day  influenza   Vaccine 0.5 milliLiter(s) IntraMuscular once  lactulose Syrup 30 Gram(s) Oral every 8 hours  levothyroxine 25 MICROGram(s) Oral daily  polyethylene glycol 3350 17 Gram(s) Oral daily  senna 2 Tablet(s) Oral at bedtime    MEDICATIONS  (PRN):      Vital Signs Last 24 Hrs  T(F): 98.5 (09-27-21 @ 15:39), Max: 98.8 (09-27-21 @ 01:02)  HR: 68 (09-27-21 @ 15:39) (65 - 76)  BP: 117/79 (09-27-21 @ 15:39) (102/68 - 117/79)  RR: 18 (09-27-21 @ 15:39) (18 - 18)  SpO2: 96% (09-27-21 @ 15:39) (94% - 96%)  Telemetry:   CAPILLARY BLOOD GLUCOSE        I&O's Summary    27 Sep 2021 07:01  -  27 Sep 2021 18:22  --------------------------------------------------------  IN: 600 mL / OUT: 0 mL / NET: 600 mL        PHYSICAL EXAM:  GENERAL: NAD, well-developed  HEAD:  Atraumatic, Normocephalic  EYES: EOMI, PERRLA, conjunctiva and sclera clear  NECK: Supple, No JVD  CHEST/LUNG: Clear to auscultation bilaterally; No wheeze  HEART: Regular rate and rhythm; No murmurs, rubs, or gallops  ABDOMEN: Soft, Nontender, Nondistended; Bowel sounds present  EXTREMITIES:  2+ Peripheral Pulses, No clubbing, cyanosis, or edema  PSYCH: AAOx3  NEUROLOGY: non-focal  SKIN: No rashes or lesions    LABS:                        13.2   6.36  )-----------( 200      ( 26 Sep 2021 07:23 )             39.8     09-27    137  |  102  |  7   ----------------------------<  81  3.5   |  21<L>  |  0.54    Ca    8.5      27 Sep 2021 07:27    TPro  6.3  /  Alb  2.9<L>  /  TBili  10.5<H>  /  DBili  x   /  AST  2400<H>  /  ALT  1506<H>  /  AlkPhos  389<H>  09-27              RADIOLOGY & ADDITIONAL TESTS:    Imaging Personally Reviewed:    Consultant(s) Notes Reviewed:      Care Discussed with Consultants/Other Providers:

## 2021-09-27 NOTE — PROGRESS NOTE ADULT - SUBJECTIVE AND OBJECTIVE BOX
Patient is a 60y Female     Patient is a 60y old  Female who presents with a chief complaint of hepatitis, jaundice (26 Sep 2021 14:33)      HPI:   60F with PMH of chronic lower back pain 2/2 degenerative changes, chronic smoker, h/o COPD, noncompliant with treatment, had a CT C/A/P done in 4/2021 2/2 chronic pulmonary nodules and right adrenal adenoma monitoring.  ptn states for the past 2 weeks her urine is brown, she feels nauseous, has diffuse abdominal and back pain, denies dysuria. states she has fevers but hasnt checked her temps. states she has chills  One episode of NBNB emesis 2 weeks ago. Reports constipation with occasional soft stools. never had a colonoscopy, refused COVID vaccine, [facility]. Denies taking Tylenol.    Denies chest pain, leg swelling or pain. (24 Sep 2021 18:33)      PAST MEDICAL & SURGICAL HISTORY:  Hypothyroidism    COPD not affecting current episode of care    Adrenal adenoma    Low back pain    DJD (degenerative joint disease)    Nicotine addiction        MEDICATIONS  (STANDING):  chlorhexidine 2% Cloths 1 Application(s) Topical daily  enoxaparin Injectable 40 milliGRAM(s) SubCutaneous daily  gabapentin 100 milliGRAM(s) Oral three times a day  influenza   Vaccine 0.5 milliLiter(s) IntraMuscular once  lactulose Syrup 30 Gram(s) Oral every 8 hours  levothyroxine 25 MICROGram(s) Oral daily  polyethylene glycol 3350 17 Gram(s) Oral daily  senna 2 Tablet(s) Oral at bedtime      Allergies    penicillin (Rash)    Intolerances        SOCIAL HISTORY:  Denies ETOh,Smoking,     FAMILY HISTORY:      REVIEW OF SYSTEMS:    CONSTITUTIONAL: No weakness, fevers or chills  EYES/ENT: No visual changes;  No vertigo or throat pain   NECK: No pain or stiffness  RESPIRATORY: No cough, wheezing, hemoptysis; No shortness of breath  CARDIOVASCULAR: No chest pain or palpitations  GASTROINTESTINAL: No abdominal or epigastric pain. No nausea, vomiting, or hematemesis; No diarrhea or constipation. No melena or hematochezia.  GENITOURINARY: No dysuria, frequency or hematuria  NEUROLOGICAL: No numbness or weakness  SKIN: No itching, burning, rashes, or lesions   All other review of systems is negative unless indicated above.    VITAL:  T(C): , Max: 37.1 (09-27-21 @ 01:02)  T(F): , Max: 98.8 (09-27-21 @ 01:02)  HR: 65 (09-27-21 @ 01:02)  BP: 102/68 (09-27-21 @ 01:02)  BP(mean): --  RR: 18 (09-27-21 @ 01:02)  SpO2: 94% (09-27-21 @ 01:02)  Wt(kg): --    I and O's:    09-25 @ 07:01  -  09-26 @ 07:00  --------------------------------------------------------  IN: 80 mL / OUT: 0 mL / NET: 80 mL          PHYSICAL EXAM:    Constitutional: NAD  HEENT: PERRLA,   Neck: No JVD  Respiratory: CTA B/L  Cardiovascular: S1 and S2  Gastrointestinal: BS+, soft, NT/ND  Extremities: No peripheral edema  Neurological: A/O x 3, no focal deficits  Psychiatric: Normal mood, normal affect  : No Cee  Skin: No rashes  Access: Not applicable  Back: No CVA tenderness    LABS:                        13.2   6.36  )-----------( 200      ( 26 Sep 2021 07:23 )             39.8     09-27    137  |  102  |  7   ----------------------------<  81  3.5   |  21<L>  |  0.54    Ca    8.5      27 Sep 2021 07:27    TPro  6.3  /  Alb  2.9<L>  /  TBili  10.5<H>  /  DBili  x   /  AST  2400<H>  /  ALT  1506<H>  /  AlkPhos  389<H>  09-27          RADIOLOGY & ADDITIONAL STUDIES:

## 2021-09-28 ENCOUNTER — RESULT REVIEW (OUTPATIENT)
Age: 61
End: 2021-09-28

## 2021-09-28 LAB
ANION GAP SERPL CALC-SCNC: 14 MMOL/L — SIGNIFICANT CHANGE UP (ref 5–17)
BLD GP AB SCN SERPL QL: NEGATIVE — SIGNIFICANT CHANGE UP
BUN SERPL-MCNC: 4 MG/DL — LOW (ref 7–23)
CALCIUM SERPL-MCNC: 8.5 MG/DL — SIGNIFICANT CHANGE UP (ref 8.4–10.5)
CHLORIDE SERPL-SCNC: 99 MMOL/L — SIGNIFICANT CHANGE UP (ref 96–108)
CO2 SERPL-SCNC: 22 MMOL/L — SIGNIFICANT CHANGE UP (ref 22–31)
CREAT SERPL-MCNC: 0.5 MG/DL — SIGNIFICANT CHANGE UP (ref 0.5–1.3)
GLUCOSE SERPL-MCNC: 94 MG/DL — SIGNIFICANT CHANGE UP (ref 70–99)
HCT VFR BLD CALC: 39.2 % — SIGNIFICANT CHANGE UP (ref 34.5–45)
HGB BLD-MCNC: 13.2 G/DL — SIGNIFICANT CHANGE UP (ref 11.5–15.5)
INR BLD: 1.35 RATIO — HIGH (ref 0.88–1.16)
MCHC RBC-ENTMCNC: 28.9 PG — SIGNIFICANT CHANGE UP (ref 27–34)
MCHC RBC-ENTMCNC: 33.7 GM/DL — SIGNIFICANT CHANGE UP (ref 32–36)
MCV RBC AUTO: 86 FL — SIGNIFICANT CHANGE UP (ref 80–100)
MITOCHONDRIA AB SER-ACNC: SIGNIFICANT CHANGE UP
NRBC # BLD: 0 /100 WBCS — SIGNIFICANT CHANGE UP (ref 0–0)
PLATELET # BLD AUTO: 210 K/UL — SIGNIFICANT CHANGE UP (ref 150–400)
POTASSIUM SERPL-MCNC: 3.2 MMOL/L — LOW (ref 3.5–5.3)
POTASSIUM SERPL-SCNC: 3.2 MMOL/L — LOW (ref 3.5–5.3)
PROTHROM AB SERPL-ACNC: 16 SEC — HIGH (ref 10.6–13.6)
RBC # BLD: 4.56 M/UL — SIGNIFICANT CHANGE UP (ref 3.8–5.2)
RBC # FLD: 19.3 % — HIGH (ref 10.3–14.5)
RH IG SCN BLD-IMP: NEGATIVE — SIGNIFICANT CHANGE UP
SMOOTH MUSCLE AB SER-ACNC: ABNORMAL
SODIUM SERPL-SCNC: 135 MMOL/L — SIGNIFICANT CHANGE UP (ref 135–145)
WBC # BLD: 7.02 K/UL — SIGNIFICANT CHANGE UP (ref 3.8–10.5)
WBC # FLD AUTO: 7.02 K/UL — SIGNIFICANT CHANGE UP (ref 3.8–10.5)

## 2021-09-28 PROCEDURE — 88313 SPECIAL STAINS GROUP 2: CPT | Mod: 26

## 2021-09-28 PROCEDURE — 76942 ECHO GUIDE FOR BIOPSY: CPT | Mod: 26

## 2021-09-28 PROCEDURE — 88307 TISSUE EXAM BY PATHOLOGIST: CPT | Mod: 26

## 2021-09-28 PROCEDURE — 47000 NEEDLE BIOPSY OF LIVER PERQ: CPT

## 2021-09-28 RX ORDER — POTASSIUM CHLORIDE 20 MEQ
20 PACKET (EA) ORAL
Refills: 0 | Status: COMPLETED | OUTPATIENT
Start: 2021-09-28 | End: 2021-09-28

## 2021-09-28 RX ADMIN — Medication 25 MICROGRAM(S): at 05:32

## 2021-09-28 RX ADMIN — Medication 20 MILLIEQUIVALENT(S): at 20:22

## 2021-09-28 RX ADMIN — LACTULOSE 30 GRAM(S): 10 SOLUTION ORAL at 21:44

## 2021-09-28 RX ADMIN — Medication 20 MILLIEQUIVALENT(S): at 15:26

## 2021-09-28 RX ADMIN — GABAPENTIN 100 MILLIGRAM(S): 400 CAPSULE ORAL at 12:00

## 2021-09-28 RX ADMIN — GABAPENTIN 100 MILLIGRAM(S): 400 CAPSULE ORAL at 21:44

## 2021-09-28 RX ADMIN — CHLORHEXIDINE GLUCONATE 1 APPLICATION(S): 213 SOLUTION TOPICAL at 12:01

## 2021-09-28 RX ADMIN — LACTULOSE 30 GRAM(S): 10 SOLUTION ORAL at 12:00

## 2021-09-28 RX ADMIN — SENNA PLUS 2 TABLET(S): 8.6 TABLET ORAL at 21:44

## 2021-09-28 NOTE — PROGRESS NOTE ADULT - SUBJECTIVE AND OBJECTIVE BOX
Patient is a 60y Female     Patient is a 60y old  Female who presents with a chief complaint of hepatitis, jaundice (27 Sep 2021 18:21)      HPI:   60F with PMH of chronic lower back pain 2/2 degenerative changes, chronic smoker, h/o COPD, noncompliant with treatment, had a CT C/A/P done in 4/2021 2/2 chronic pulmonary nodules and right adrenal adenoma monitoring.  ptn states for the past 2 weeks her urine is brown, she feels nauseous, has diffuse abdominal and back pain, denies dysuria. states she has fevers but hasnt checked her temps. states she has chills  One episode of NBNB emesis 2 weeks ago. Reports constipation with occasional soft stools. never had a colonoscopy, refused COVID vaccine, [facility]. Denies taking Tylenol.    Denies chest pain, leg swelling or pain. (24 Sep 2021 18:33)      PAST MEDICAL & SURGICAL HISTORY:  Hypothyroidism    COPD not affecting current episode of care    Adrenal adenoma    Low back pain    DJD (degenerative joint disease)    Nicotine addiction        MEDICATIONS  (STANDING):  chlorhexidine 2% Cloths 1 Application(s) Topical daily  gabapentin 100 milliGRAM(s) Oral three times a day  influenza   Vaccine 0.5 milliLiter(s) IntraMuscular once  lactulose Syrup 30 Gram(s) Oral every 8 hours  levothyroxine 25 MICROGram(s) Oral daily  polyethylene glycol 3350 17 Gram(s) Oral daily  senna 2 Tablet(s) Oral at bedtime      Allergies    penicillin (Rash)    Intolerances        SOCIAL HISTORY:  Denies ETOh,Smoking,     FAMILY HISTORY:      REVIEW OF SYSTEMS:    CONSTITUTIONAL: No weakness, fevers or chills  EYES/ENT: No visual changes;  No vertigo or throat pain   NECK: No pain or stiffness  RESPIRATORY: No cough, wheezing, hemoptysis; No shortness of breath  CARDIOVASCULAR: No chest pain or palpitations  GASTROINTESTINAL: No abdominal or epigastric pain. No nausea, vomiting, or hematemesis; No diarrhea or constipation. No melena or hematochezia.  GENITOURINARY: No dysuria, frequency or hematuria  NEUROLOGICAL: No numbness or weakness  SKIN: No itching, burning, rashes, or lesions   All other review of systems is negative unless indicated above.    VITAL:  T(C): , Max: 37.2 (09-27-21 @ 23:51)  T(F): , Max: 99 (09-27-21 @ 23:51)  HR: 60 (09-27-21 @ 23:51)  BP: 110/73 (09-27-21 @ 23:51)  BP(mean): --  RR: 18 (09-27-21 @ 23:51)  SpO2: 96% (09-27-21 @ 23:51)  Wt(kg): --    I and O's:    09-27 @ 07:01  -  09-28 @ 07:00  --------------------------------------------------------  IN: 600 mL / OUT: 0 mL / NET: 600 mL          PHYSICAL EXAM:    Constitutional: NAD  HEENT: PERRLA,   Neck: No JVD  Respiratory: CTA B/L  Cardiovascular: S1 and S2  Gastrointestinal: BS+, soft, NT/ND  Extremities: No peripheral edema  Neurological: A/O x 3, no focal deficits  Psychiatric: Normal mood, normal affect  : No Cee  Skin: No rashes  Access: Not applicable  Back: No CVA tenderness    LABS:    09-27    137  |  102  |  7   ----------------------------<  81  3.5   |  21<L>  |  0.54    Ca    8.5      27 Sep 2021 07:27    TPro  6.3  /  Alb  2.9<L>  /  TBili  10.5<H>  /  DBili  x   /  AST  2400<H>  /  ALT  1506<H>  /  AlkPhos  389<H>  09-27          RADIOLOGY & ADDITIONAL STUDIES:

## 2021-09-28 NOTE — PROGRESS NOTE ADULT - SUBJECTIVE AND OBJECTIVE BOX
[facility] [place] [facility]), [facility]        Patient is a 60y old  Female who presents with a chief complaint of hepatitis, jaundice (28 Sep 2021 07:27)    Excerpt from H&P,"      60F with PMH of chronic lower back pain 2/2 degenerative changes, chronic smoker, h/o COPD, noncompliant with treatment, had a CT C/A/P done in 2021 2/2 chronic pulmonary nodules and right adrenal adenoma monitoring.  ptn states for the past 2 weeks her urine is brown, she feels nauseous, has diffuse abdominal and back pain, denies dysuria. states she has fevers but hasnt checked her temps. states she has chills  One episode of NBNB emesis 2 weeks ago. Reports constipation with occasional soft stools. never had a colonoscopy, refused COVID vaccine, has a covid swab [facility] an [facility] center: negative. Denies taking Tylenol.    Denies chest pain, leg swelling or pain.             *****PAST MEDICAL / Surgical  HISTORY:  PAST MEDICAL & SURGICAL HISTORY:  Hypothyroidism    COPD not affecting current episode of care    Adrenal adenoma    Low back pain    DJD (degenerative joint disease)    Nicotine addiction             *****FAMILY HISTORY:  FAMILY HISTORY:           *****SOCIAL HISTORY:  Alcohol: None  Smoking: None         *****ALLERGIES:   Allergies    penicillin (Rash)    Intolerances             *****MEDICATIONS: current medication reviewed and documented.   MEDICATIONS  (STANDING):  chlorhexidine 2% Cloths 1 Application(s) Topical daily  gabapentin 100 milliGRAM(s) Oral three times a day  influenza   Vaccine 0.5 milliLiter(s) IntraMuscular once  lactulose Syrup 30 Gram(s) Oral every 8 hours  levothyroxine 25 MICROGram(s) Oral daily  polyethylene glycol 3350 17 Gram(s) Oral daily  senna 2 Tablet(s) Oral at bedtime    MEDICATIONS  (PRN):           *****REVIEW OF SYSTEM:  GEN: no fever, no chills, no pain  RESP: no SOB, no cough, no sputum  CVS: no chest pain, no palpitations, no edema  GI: no abdominal pain, no nausea, no vomiting, no constipation, no diarrhea  : no dysurea, no frequency, no hematurea  Neuro: no headache, no dizziness  PSYCH: no anxiety, no depression  Derm : no itching, no rash         *****VITAL SIGNS:  T(C): 36.8 (09-28-21 @ 09:36), Max: 37.2 (21 @ 23:51)  HR: 75 (21 @ 09:36) (60 - 75)  BP: 103/69 (21 @ 09:36) (103/69 - 117/79)  RR: 18 (21 @ 09:36) (18 - 18)  SpO2: 96% (21 @ 09:36) (96% - 96%)  Wt(kg): --     @ 07:01  -   @ 07:00  --------------------------------------------------------  IN: 600 mL / OUT: 0 mL / NET: 600 mL             *****PHYSICAL EXAM: icteric   Alert oriented x2  Attention comprehension are limited  . Able to name, repeat,  without any difficulty.   Able to follow 1 step commands.     EOMI fundi not visualized,   No facial asymmetry   Tongue is midline     Moving all 4 ext symmetrically   no limitation of straight leg raising   + asterixis      Gait : not assessed.  B/L down going toes               *****LAB AND IMAGIN.2   7.02  )-----------( 210      ( 28 Sep 2021 07:16 )             39.2                   135  |  99  |  4<L>  ----------------------------<  94  3.2<L>   |  22  |  0.50    Ca    8.5      28 Sep 2021 07:14    TPro  6.3  /  Alb  2.9<L>  /  TBili  10.5<H>  /  DBili  x   /  AST  2400<H>  /  ALT  1506<H>  /  AlkPhos  389<H>      PT/INR - ( 28 Sep 2021 07:16 )   PT: 16.0 sec;   INR: 1.35 ratio                                     [All pertinent recent Imaging reports reviewed]         *****A S S E S S M E N T   A N D   P L A N :  Excerpt from H&P,"    60F with PMH of chronic lower back pain 2/2 degenerative changes, chronic smoker, h/o COPD, noncompliant with treatment, had a CT C/A/P done in 2021 2/2 chronic pulmonary nodules and right adrenal adenoma monitoring.  ptn states for the past 2 weeks her urine is brown, she feels nauseous, has diffuse abdominal and back pain, denies dysuria. states she has fevers but hasnt checked her temps. states she has chills  One episode of NBNB emesis 2 weeks ago. Reports constipation with occasional soft stools. never had a colonoscopy, refused COVID vaccine, [facility]. Denies taking Tylenol.    Denies chest pain, leg swelling or pain.          Problem/Recommendations 1: back pain   chronic   continue gabapentin   continue lactulose monitor bms       Problem/Recommendations 2:  transaminitis   trend   pt reports taking lipitor   ? role for ursodiol.   defer to liver      ___________________________  Will follow with you.  Thank you,  Ruth Chung MD  Diplomate of the American Board of Neurology and Psychiatry.  Diplomate of the American Board of Vascular Neurology.   [facility] ([facility]), [facility]   Ph: [phone]    Differential diagnosis and plan of care discussed with patient after the evaluation.   Advanced care planning options discussed.   Pain assessed and judicious use of narcotics when appropriate was discussed.  Importance of Fall prevention discussed.  Counseling on Smoking and Alcohol cessation was offered when appropriate.  Counseling on Diet, exercise, and medication compliance was done.   83 minutes spent on the total encounter;  more than 50 % of the visit was spent on counseling  and or coordinating care by the attending physician.    Thank you for allowing me to participate in the care of this zacarias patient. Please do not hesitate to call me if you have any questions.     This and subsequent notes  will  inherently be subject to errors including those of syntax and substitutions which may escape proofreading. In such instances original meaning may be extrapolated by contextual derivation.

## 2021-09-28 NOTE — PRE PROCEDURE NOTE - PRE PROCEDURE EVALUATION
HPI:  60y Female with cirrhotic appearing liver on recent CT and MRI with elevated FARIDEH suggestive of autoimmune etiology. IR consulted for biopsy confirmation.     NPO status: Since midnight  Anticoagulation: None  Antibiotics: None    Allergies: penicillin (Rash)      PAST MEDICAL & SURGICAL HISTORY:  Hypothyroidism    COPD not affecting current episode of care    Adrenal adenoma    Low back pain    DJD (degenerative joint disease)    Nicotine addiction    Pertinent labs:                      13.2   7.02  )-----------( 210      ( 28 Sep 2021 07:16 )             39.2   09-28    135  |  99  |  4<L>  ----------------------------<  94  3.2<L>   |  22  |  0.50    Ca    8.5      28 Sep 2021 07:14    TPro  6.3  /  Alb  2.9<L>  /  TBili  10.5<H>  /  DBili  x   /  AST  2400<H>  /  ALT  1506<H>  /  AlkPhos  389<H>  09-27  PT/INR - ( 28 Sep 2021 07:16 )   PT: 16.0 sec;   INR: 1.35 ratio      Consent: Procedure/risks/ Benefits explained. Informed consent obtained. Pt verbalizes understanding.

## 2021-09-28 NOTE — PROGRESS NOTE ADULT - SUBJECTIVE AND OBJECTIVE BOX
Patient is a 60y old  Female who presents with a chief complaint of hepatitis, jaundice (28 Sep 2021 10:36)      SUBJECTIVE / OVERNIGHT EVENTS: awaiting liver biopsy    MEDICATIONS  (STANDING):  chlorhexidine 2% Cloths 1 Application(s) Topical daily  gabapentin 100 milliGRAM(s) Oral three times a day  influenza   Vaccine 0.5 milliLiter(s) IntraMuscular once  lactulose Syrup 30 Gram(s) Oral every 8 hours  levothyroxine 25 MICROGram(s) Oral daily  polyethylene glycol 3350 17 Gram(s) Oral daily  potassium chloride    Tablet ER 20 milliEquivalent(s) Oral every 2 hours  senna 2 Tablet(s) Oral at bedtime    MEDICATIONS  (PRN):      Vital Signs Last 24 Hrs  T(F): 99 (09-28-21 @ 09:36), Max: 99 (09-27-21 @ 23:51)  HR: 66 (09-28-21 @ 09:36) (60 - 75)  BP: 119/75 (09-28-21 @ 09:36) (103/69 - 119/75)  RR: 18 (09-28-21 @ 09:36) (18 - 18)  SpO2: 95% (09-28-21 @ 09:36) (95% - 96%)  Telemetry:   CAPILLARY BLOOD GLUCOSE        I&O's Summary    27 Sep 2021 07:01  -  28 Sep 2021 07:00  --------------------------------------------------------  IN: 600 mL / OUT: 0 mL / NET: 600 mL    28 Sep 2021 07:01  -  28 Sep 2021 16:39  --------------------------------------------------------  IN: 0 mL / OUT: 0 mL / NET: 0 mL        PHYSICAL EXAM:  GENERAL: NAD, well-developed  HEAD:  Atraumatic, Normocephalic  EYES: EOMI, PERRLA, conjunctiva and sclera clear  NECK: Supple, No JVD  CHEST/LUNG: Clear to auscultation bilaterally; No wheeze  HEART: Regular rate and rhythm; No murmurs, rubs, or gallops  ABDOMEN: Soft, Nontender, Nondistended; Bowel sounds present  EXTREMITIES:  2+ Peripheral Pulses, No clubbing, cyanosis, or edema  PSYCH: AAOx3  NEUROLOGY: non-focal  SKIN: No rashes or lesions    LABS:                        13.2   7.02  )-----------( 210      ( 28 Sep 2021 07:16 )             39.2     09-28    135  |  99  |  4<L>  ----------------------------<  94  3.2<L>   |  22  |  0.50    Ca    8.5      28 Sep 2021 07:14    TPro  6.3  /  Alb  2.9<L>  /  TBili  10.5<H>  /  DBili  x   /  AST  2400<H>  /  ALT  1506<H>  /  AlkPhos  389<H>  09-27    PT/INR - ( 28 Sep 2021 07:16 )   PT: 16.0 sec;   INR: 1.35 ratio                   RADIOLOGY & ADDITIONAL TESTS:    Imaging Personally Reviewed:    Consultant(s) Notes Reviewed:      Care Discussed with Consultants/Other Providers:

## 2021-09-29 LAB
ALBUMIN SERPL ELPH-MCNC: 2.7 G/DL — LOW (ref 3.3–5)
ALP SERPL-CCNC: 349 U/L — HIGH (ref 40–120)
ALT FLD-CCNC: 1557 U/L — HIGH (ref 10–45)
AMYLASE P1 CFR SERPL: 42 U/L — SIGNIFICANT CHANGE UP (ref 25–125)
ANION GAP SERPL CALC-SCNC: 12 MMOL/L — SIGNIFICANT CHANGE UP (ref 5–17)
AST SERPL-CCNC: 2159 U/L — HIGH (ref 10–40)
BILIRUB DIRECT SERPL-MCNC: >10 MG/DL — HIGH (ref 0–0.2)
BILIRUB DIRECT SERPL-MCNC: >10 MG/DL — HIGH (ref 0–0.2)
BILIRUB INDIRECT FLD-MCNC: <4.2 MG/DL — HIGH (ref 0.2–1)
BILIRUB INDIRECT FLD-MCNC: <4.2 MG/DL — HIGH (ref 0.2–1)
BILIRUB SERPL-MCNC: 14.2 MG/DL — HIGH (ref 0.2–1.2)
BILIRUB SERPL-MCNC: 14.2 MG/DL — HIGH (ref 0.2–1.2)
BUN SERPL-MCNC: 4 MG/DL — LOW (ref 7–23)
CALCIUM SERPL-MCNC: 8.2 MG/DL — LOW (ref 8.4–10.5)
CERULOPLASMIN SERPL-MCNC: 27 MG/DL — SIGNIFICANT CHANGE UP (ref 16–45)
CHLORIDE SERPL-SCNC: 101 MMOL/L — SIGNIFICANT CHANGE UP (ref 96–108)
CO2 SERPL-SCNC: 23 MMOL/L — SIGNIFICANT CHANGE UP (ref 22–31)
CREAT SERPL-MCNC: 0.45 MG/DL — LOW (ref 0.5–1.3)
FERRITIN SERPL-MCNC: 2469 NG/ML — HIGH (ref 15–150)
GLUCOSE SERPL-MCNC: 102 MG/DL — HIGH (ref 70–99)
HCT VFR BLD CALC: 38.1 % — SIGNIFICANT CHANGE UP (ref 34.5–45)
HGB BLD-MCNC: 13.4 G/DL — SIGNIFICANT CHANGE UP (ref 11.5–15.5)
LIDOCAIN IGE QN: 44 U/L — SIGNIFICANT CHANGE UP (ref 7–60)
MCHC RBC-ENTMCNC: 29.6 PG — SIGNIFICANT CHANGE UP (ref 27–34)
MCHC RBC-ENTMCNC: 35.2 GM/DL — SIGNIFICANT CHANGE UP (ref 32–36)
MCV RBC AUTO: 84.3 FL — SIGNIFICANT CHANGE UP (ref 80–100)
NRBC # BLD: 0 /100 WBCS — SIGNIFICANT CHANGE UP (ref 0–0)
PLATELET # BLD AUTO: 232 K/UL — SIGNIFICANT CHANGE UP (ref 150–400)
POTASSIUM SERPL-MCNC: 3.2 MMOL/L — LOW (ref 3.5–5.3)
POTASSIUM SERPL-SCNC: 3.2 MMOL/L — LOW (ref 3.5–5.3)
PROT SERPL-MCNC: 5.8 G/DL — LOW (ref 6–8.3)
RBC # BLD: 4.52 M/UL — SIGNIFICANT CHANGE UP (ref 3.8–5.2)
RBC # FLD: 19.7 % — HIGH (ref 10.3–14.5)
SODIUM SERPL-SCNC: 136 MMOL/L — SIGNIFICANT CHANGE UP (ref 135–145)
WBC # BLD: 7.73 K/UL — SIGNIFICANT CHANGE UP (ref 3.8–10.5)
WBC # FLD AUTO: 7.73 K/UL — SIGNIFICANT CHANGE UP (ref 3.8–10.5)

## 2021-09-29 PROCEDURE — 99231 SBSQ HOSP IP/OBS SF/LOW 25: CPT

## 2021-09-29 RX ORDER — POTASSIUM CHLORIDE 20 MEQ
40 PACKET (EA) ORAL ONCE
Refills: 0 | Status: COMPLETED | OUTPATIENT
Start: 2021-09-29 | End: 2021-09-29

## 2021-09-29 RX ORDER — POTASSIUM CHLORIDE 20 MEQ
20 PACKET (EA) ORAL DAILY
Refills: 0 | Status: DISCONTINUED | OUTPATIENT
Start: 2021-09-30 | End: 2021-10-05

## 2021-09-29 RX ADMIN — Medication 25 MICROGRAM(S): at 06:17

## 2021-09-29 RX ADMIN — Medication 40 MILLIGRAM(S): at 11:40

## 2021-09-29 RX ADMIN — LACTULOSE 30 GRAM(S): 10 SOLUTION ORAL at 21:57

## 2021-09-29 RX ADMIN — SENNA PLUS 2 TABLET(S): 8.6 TABLET ORAL at 21:57

## 2021-09-29 RX ADMIN — LACTULOSE 30 GRAM(S): 10 SOLUTION ORAL at 11:40

## 2021-09-29 RX ADMIN — GABAPENTIN 100 MILLIGRAM(S): 400 CAPSULE ORAL at 06:16

## 2021-09-29 RX ADMIN — GABAPENTIN 100 MILLIGRAM(S): 400 CAPSULE ORAL at 21:57

## 2021-09-29 RX ADMIN — POLYETHYLENE GLYCOL 3350 17 GRAM(S): 17 POWDER, FOR SOLUTION ORAL at 11:40

## 2021-09-29 RX ADMIN — LACTULOSE 30 GRAM(S): 10 SOLUTION ORAL at 06:17

## 2021-09-29 RX ADMIN — GABAPENTIN 100 MILLIGRAM(S): 400 CAPSULE ORAL at 11:40

## 2021-09-29 RX ADMIN — Medication 40 MILLIEQUIVALENT(S): at 08:23

## 2021-09-29 RX ADMIN — CHLORHEXIDINE GLUCONATE 1 APPLICATION(S): 213 SOLUTION TOPICAL at 11:40

## 2021-09-29 NOTE — PROGRESS NOTE ADULT - SUBJECTIVE AND OBJECTIVE BOX
Interventional Radiology Follow-Up Note.     Patient seen and examined @ bedside between 7am and 7:30am.     This is a 60y Female s/p liver parenchymal biopsy on 9/28 in Interventional Radiology with .   Reports lower abdominal pain overnight. Didn't require pain medications. Pt unable to rate it. NO BM for 2 days. Denies n/v,      Medication:       Vitals:   T(F): 97.4, Max: 99.4 (23:52)  HR: 71  BP: 95/61  RR: 18  SpO2: 94%    Physical Exam:  General: Nontoxic, in NAD.  Abdomen: soft, ND. Mild ttp.        09-28-21 @ 07:01  -  09-29-21 @ 07:00  --------------------------------------------------------  IN: 0 mL / OUT: 0 mL / NET: 0 mL              LABS:  Na: 136 (09-29 @ 06:56), 135 (09-28 @ 07:14), 137 (09-27 @ 07:27)  K: 3.2 (09-29 @ 06:56), 3.2 (09-28 @ 07:14), 3.5 (09-27 @ 07:27)  Cl: 101 (09-29 @ 06:56), 99 (09-28 @ 07:14), 102 (09-27 @ 07:27)  CO2: 23 (09-29 @ 06:56), 22 (09-28 @ 07:14), 21 (09-27 @ 07:27)  BUN: 4 (09-29 @ 06:56), 4 (09-28 @ 07:14), 7 (09-27 @ 07:27)  Cr: 0.45 (09-29 @ 06:56), 0.50 (09-28 @ 07:14), 0.54 (09-27 @ 07:27)  Glu: 102(09-29 @ 06:56), 94(09-28 @ 07:14), 81(09-27 @ 07:27)    Hgb: 13.2 (09-28 @ 07:16)  Hct: 39.2 (09-28 @ 07:16)  WBC: 7.02 (09-28 @ 07:16)  Plt: 210 (09-28 @ 07:16)    INR: 1.35 09-28-21 @ 07:16  PTT:        Assessment/Plan: 60y Female with cirrhotic appearing liver on recent CT and MRI with elevated FARIDEH suggestive of autoimmune etiology. S/p liver parenchymal biopsy on 9/28 in Interventional Radiology with .     - Check CBC AM.   - Pathology pending.   - Will D/w DR. Justin and team WALTER richards.        Please call IR at  [phone] with any questions, concerns, or issues regarding above.    Also available on Teams.     Interventional Radiology Follow-Up Note.     Patient seen and examined @ bedside between 7am and 7:30am.     This is a 60y Female s/p liver parenchymal biopsy on 9/28 in Interventional Radiology with .   Reports lower abdominal pain overnight. Didn't require pain medications. Pt unable to rate it. NO BM for 2 days. Denies n/v,      Medication:       Vitals:   T(F): 97.4, Max: 99.4 (23:52)  HR: 71  BP: 95/61  RR: 18  SpO2: 94%    Physical Exam:  General: Nontoxic, in NAD.  Abdomen: soft, ND. Mild ttp.        09-28-21 @ 07:01  -  09-29-21 @ 07:00  --------------------------------------------------------  IN: 0 mL / OUT: 0 mL / NET: 0 mL              LABS:  Na: 136 (09-29 @ 06:56), 135 (09-28 @ 07:14), 137 (09-27 @ 07:27)  K: 3.2 (09-29 @ 06:56), 3.2 (09-28 @ 07:14), 3.5 (09-27 @ 07:27)  Cl: 101 (09-29 @ 06:56), 99 (09-28 @ 07:14), 102 (09-27 @ 07:27)  CO2: 23 (09-29 @ 06:56), 22 (09-28 @ 07:14), 21 (09-27 @ 07:27)  BUN: 4 (09-29 @ 06:56), 4 (09-28 @ 07:14), 7 (09-27 @ 07:27)  Cr: 0.45 (09-29 @ 06:56), 0.50 (09-28 @ 07:14), 0.54 (09-27 @ 07:27)  Glu: 102(09-29 @ 06:56), 94(09-28 @ 07:14), 81(09-27 @ 07:27)    Hgb: 13.2 (09-28 @ 07:16)  Hct: 39.2 (09-28 @ 07:16)  WBC: 7.02 (09-28 @ 07:16)  Plt: 210 (09-28 @ 07:16)    INR: 1.35 09-28-21 @ 07:16  PTT:        Assessment/Plan: 60y Female with cirrhotic appearing liver on recent CT and MRI with elevated FARIDEH suggestive of autoimmune etiology. S/p liver parenchymal biopsy on 9/28 in Interventional Radiology with .     - Check CBC AM stable.   - Pathology pending.   - Ir will sign off.  - Will D/w DR. Fernández and team WALTER richards.        Please call IR at  [phone] with any questions, concerns, or issues regarding above.    Also available on Teams.

## 2021-09-29 NOTE — PROGRESS NOTE ADULT - SUBJECTIVE AND OBJECTIVE BOX
Patient is a 60y Female     Patient is a 60y old  Female who presents with a chief complaint of hepatitis, jaundice (28 Sep 2021 16:38)      HPI:   60F with PMH of chronic lower back pain 2/2 degenerative changes, chronic smoker, h/o COPD, noncompliant with treatment, had a CT C/A/P done in 4/2021 2/2 chronic pulmonary nodules and right adrenal adenoma monitoring.  ptn states for the past 2 weeks her urine is brown, she feels nauseous, has diffuse abdominal and back pain, denies dysuria. states she has fevers but hasnt checked her temps. states she has chills  One episode of NBNB emesis 2 weeks ago. Reports constipation with occasional soft stools. never had a colonoscopy, refused COVID vaccine, [facility]. Denies taking Tylenol.    Denies chest pain, leg swelling or pain. (24 Sep 2021 18:33)      PAST MEDICAL & SURGICAL HISTORY:  Hypothyroidism    COPD not affecting current episode of care    Adrenal adenoma    Low back pain    DJD (degenerative joint disease)    Nicotine addiction        MEDICATIONS  (STANDING):  chlorhexidine 2% Cloths 1 Application(s) Topical daily  gabapentin 100 milliGRAM(s) Oral three times a day  influenza   Vaccine 0.5 milliLiter(s) IntraMuscular once  lactulose Syrup 30 Gram(s) Oral every 8 hours  levothyroxine 25 MICROGram(s) Oral daily  polyethylene glycol 3350 17 Gram(s) Oral daily  predniSONE   Tablet 40 milliGRAM(s) Oral daily  senna 2 Tablet(s) Oral at bedtime      Allergies    penicillin (Rash)    Intolerances        SOCIAL HISTORY:  Denies ETOh,Smoking,     FAMILY HISTORY:      REVIEW OF SYSTEMS:    CONSTITUTIONAL: No weakness, fevers or chills  EYES/ENT: No visual changes;  No vertigo or throat pain   NECK: No pain or stiffness  RESPIRATORY: No cough, wheezing, hemoptysis; No shortness of breath  CARDIOVASCULAR: No chest pain or palpitations  GASTROINTESTINAL: No abdominal or epigastric pain. No nausea, vomiting, or hematemesis; No diarrhea or constipation. No melena or hematochezia.  GENITOURINARY: No dysuria, frequency or hematuria  NEUROLOGICAL: No numbness or weakness  SKIN: No itching, burning, rashes, or lesions   All other review of systems is negative unless indicated above.    VITAL:  T(C): , Max: 37.4 (09-28-21 @ 23:52)  T(F): , Max: 99.4 (09-28-21 @ 23:52)  HR: 71 (09-29-21 @ 06:21)  BP: 95/61 (09-29-21 @ 06:21)  BP(mean): 87 (09-28-21 @ 18:45)  RR: 18 (09-29-21 @ 06:21)  SpO2: 94% (09-29-21 @ 06:21)  Wt(kg): --    I and O's:    09-27 @ 07:01  -  09-28 @ 07:00  --------------------------------------------------------  IN: 600 mL / OUT: 0 mL / NET: 600 mL    09-28 @ 07:01  -  09-29 @ 06:33  --------------------------------------------------------  IN: 0 mL / OUT: 0 mL / NET: 0 mL      Height (cm): 157.5 (09-28 @ 16:36)  Weight (kg): 66.7 (09-28 @ 16:36)  BMI (kg/m2): 26.9 (09-28 @ 16:36)  BSA (m2): 1.68 (09-28 @ 16:36)    PHYSICAL EXAM:    Constitutional: NAD  HEENT: PERRLA,   Neck: No JVD  Respiratory: CTA B/L  Cardiovascular: S1 and S2  Gastrointestinal: BS+, soft, NT/ND  Extremities: No peripheral edema  Neurological: A/O x 3, no focal deficits  Psychiatric: Normal mood, normal affect  : No Cee  Skin: No rashes  Access: Not applicable  Back: No CVA tenderness    LABS:                        13.2   7.02  )-----------( 210      ( 28 Sep 2021 07:16 )             39.2     09-28    135  |  99  |  4<L>  ----------------------------<  94  3.2<L>   |  22  |  0.50    Ca    8.5      28 Sep 2021 07:14    TPro  6.3  /  Alb  2.9<L>  /  TBili  10.5<H>  /  DBili  x   /  AST  2400<H>  /  ALT  1506<H>  /  AlkPhos  389<H>  09-27          RADIOLOGY & ADDITIONAL STUDIES:

## 2021-09-29 NOTE — PROGRESS NOTE ADULT - SUBJECTIVE AND OBJECTIVE BOX
Patient is a 60y old  Female who presents with a chief complaint of hepatitis, jaundice (29 Sep 2021 08:01)      SUBJECTIVE / OVERNIGHT EVENTS:    MEDICATIONS  (STANDING):  chlorhexidine 2% Cloths 1 Application(s) Topical daily  gabapentin 100 milliGRAM(s) Oral three times a day  influenza   Vaccine 0.5 milliLiter(s) IntraMuscular once  lactulose Syrup 30 Gram(s) Oral every 8 hours  levothyroxine 25 MICROGram(s) Oral daily  polyethylene glycol 3350 17 Gram(s) Oral daily  predniSONE   Tablet 40 milliGRAM(s) Oral daily  senna 2 Tablet(s) Oral at bedtime    MEDICATIONS  (PRN):      Vital Signs Last 24 Hrs  T(F): 97.6 (09-29-21 @ 15:46), Max: 99.4 (09-28-21 @ 23:52)  HR: 76 (09-29-21 @ 15:46) (67 - 77)  BP: 118/77 (09-29-21 @ 15:46) (94/67 - 118/77)  RR: 18 (09-29-21 @ 15:46) (17 - 19)  SpO2: 96% (09-29-21 @ 15:46) (93% - 96%)  Telemetry:   CAPILLARY BLOOD GLUCOSE        I&O's Summary    28 Sep 2021 07:01  -  29 Sep 2021 07:00  --------------------------------------------------------  IN: 0 mL / OUT: 0 mL / NET: 0 mL    29 Sep 2021 07:01  -  29 Sep 2021 18:03  --------------------------------------------------------  IN: 300 mL / OUT: 0 mL / NET: 300 mL        PHYSICAL EXAM:  GENERAL: NAD, well-developed  HEAD:  Atraumatic, Normocephalic  EYES: EOMI, PERRLA, conjunctiva and sclera clear  NECK: Supple, No JVD  CHEST/LUNG: Clear to auscultation bilaterally; No wheeze  HEART: Regular rate and rhythm; No murmurs, rubs, or gallops  ABDOMEN: Soft, Nontender, Nondistended; Bowel sounds present  EXTREMITIES:  2+ Peripheral Pulses, No clubbing, cyanosis, or edema  PSYCH: AAOx3  NEUROLOGY: non-focal  SKIN: No rashes or lesions    LABS:                        13.4   7.73  )-----------( 232      ( 29 Sep 2021 09:07 )             38.1     09-29    136  |  101  |  4<L>  ----------------------------<  102<H>  3.2<L>   |  23  |  0.45<L>    Ca    8.2<L>      29 Sep 2021 06:56    TPro  5.8<L>  /  Alb  2.7<L>  /  TBili  14.2<H>  /  DBili  >10.0<H>  /  AST  2159<H>  /  ALT  1557<H>  /  AlkPhos  349<H>  09-29    PT/INR - ( 28 Sep 2021 07:16 )   PT: 16.0 sec;   INR: 1.35 ratio                   RADIOLOGY & ADDITIONAL TESTS:    Imaging Personally Reviewed:    Consultant(s) Notes Reviewed:      Care Discussed with Consultants/Other Providers:   Patient is a 60y old  Female who presents with a chief complaint of hepatitis, jaundice (29 Sep 2021 08:01)      SUBJECTIVE / OVERNIGHT EVENTS: awaiting Biopsy report of liver biopsy    MEDICATIONS  (STANDING):  chlorhexidine 2% Cloths 1 Application(s) Topical daily  gabapentin 100 milliGRAM(s) Oral three times a day  influenza   Vaccine 0.5 milliLiter(s) IntraMuscular once  lactulose Syrup 30 Gram(s) Oral every 8 hours  levothyroxine 25 MICROGram(s) Oral daily  polyethylene glycol 3350 17 Gram(s) Oral daily  predniSONE   Tablet 40 milliGRAM(s) Oral daily  senna 2 Tablet(s) Oral at bedtime    MEDICATIONS  (PRN):      Vital Signs Last 24 Hrs  T(F): 97.6 (09-29-21 @ 15:46), Max: 99.4 (09-28-21 @ 23:52)  HR: 76 (09-29-21 @ 15:46) (67 - 77)  BP: 118/77 (09-29-21 @ 15:46) (94/67 - 118/77)  RR: 18 (09-29-21 @ 15:46) (17 - 19)  SpO2: 96% (09-29-21 @ 15:46) (93% - 96%)  Telemetry:   CAPILLARY BLOOD GLUCOSE        I&O's Summary    28 Sep 2021 07:01  -  29 Sep 2021 07:00  --------------------------------------------------------  IN: 0 mL / OUT: 0 mL / NET: 0 mL    29 Sep 2021 07:01  -  29 Sep 2021 18:03  --------------------------------------------------------  IN: 300 mL / OUT: 0 mL / NET: 300 mL        PHYSICAL EXAM:  GENERAL: NAD, well-developed  HEAD:  Atraumatic, Normocephalic  EYES: EOMI, PERRLA, conjunctiva and sclera clear  NECK: Supple, No JVD  CHEST/LUNG: Clear to auscultation bilaterally; No wheeze  HEART: Regular rate and rhythm; No murmurs, rubs, or gallops  ABDOMEN: Soft, Nontender, Nondistended; Bowel sounds present  EXTREMITIES:  2+ Peripheral Pulses, No clubbing, cyanosis, or edema  PSYCH: AAOx3  NEUROLOGY: non-focal  SKIN: No rashes or lesions    LABS:                        13.4   7.73  )-----------( 232      ( 29 Sep 2021 09:07 )             38.1     09-29    136  |  101  |  4<L>  ----------------------------<  102<H>  3.2<L>   |  23  |  0.45<L>    Ca    8.2<L>      29 Sep 2021 06:56    TPro  5.8<L>  /  Alb  2.7<L>  /  TBili  14.2<H>  /  DBili  >10.0<H>  /  AST  2159<H>  /  ALT  1557<H>  /  AlkPhos  349<H>  09-29    PT/INR - ( 28 Sep 2021 07:16 )   PT: 16.0 sec;   INR: 1.35 ratio                   RADIOLOGY & ADDITIONAL TESTS:    Imaging Personally Reviewed:    Consultant(s) Notes Reviewed:      Care Discussed with Consultants/Other Providers:

## 2021-09-29 NOTE — CHART NOTE - NSCHARTNOTEFT_GEN_A_CORE
MEDICINE NP- EPISODIC NOTE    Called by RN that Dr. Cox request ACP to see patient with concern over pt  feeling "woozy" 2/2 prednisone taken this morning. Now at this time, patient is sleeping. Per RN, pt AOx3, "woozy" feeling resolved. VSS. Reschedule/hold prednisone til GI see [place]  [id number]

## 2021-09-30 LAB
ALBUMIN SERPL ELPH-MCNC: 2.8 G/DL — LOW (ref 3.3–5)
ALBUMIN SERPL ELPH-MCNC: 2.9 G/DL — LOW (ref 3.3–5)
ALP SERPL-CCNC: 346 U/L — HIGH (ref 40–120)
ALP SERPL-CCNC: 368 U/L — HIGH (ref 40–120)
ALT FLD-CCNC: 1514 U/L — HIGH (ref 10–45)
ALT FLD-CCNC: 1671 U/L — HIGH (ref 10–45)
ANION GAP SERPL CALC-SCNC: 12 MMOL/L — SIGNIFICANT CHANGE UP (ref 5–17)
ANION GAP SERPL CALC-SCNC: 14 MMOL/L — SIGNIFICANT CHANGE UP (ref 5–17)
AST SERPL-CCNC: 1622 U/L — HIGH (ref 10–40)
AST SERPL-CCNC: 1780 U/L — HIGH (ref 10–40)
BASOPHILS # BLD AUTO: 0.04 K/UL — SIGNIFICANT CHANGE UP (ref 0–0.2)
BASOPHILS NFR BLD AUTO: 0.4 % — SIGNIFICANT CHANGE UP (ref 0–2)
BILIRUB DIRECT SERPL-MCNC: >10 MG/DL — HIGH (ref 0–0.2)
BILIRUB INDIRECT FLD-MCNC: <4.6 MG/DL — HIGH (ref 0.2–1)
BILIRUB SERPL-MCNC: 14.6 MG/DL — HIGH (ref 0.2–1.2)
BILIRUB SERPL-MCNC: 15.8 MG/DL — HIGH (ref 0.2–1.2)
BUN SERPL-MCNC: 6 MG/DL — LOW (ref 7–23)
BUN SERPL-MCNC: 7 MG/DL — SIGNIFICANT CHANGE UP (ref 7–23)
CALCIUM SERPL-MCNC: 8.1 MG/DL — LOW (ref 8.4–10.5)
CALCIUM SERPL-MCNC: 8.6 MG/DL — SIGNIFICANT CHANGE UP (ref 8.4–10.5)
CHLORIDE SERPL-SCNC: 101 MMOL/L — SIGNIFICANT CHANGE UP (ref 96–108)
CHLORIDE SERPL-SCNC: 98 MMOL/L — SIGNIFICANT CHANGE UP (ref 96–108)
CO2 SERPL-SCNC: 22 MMOL/L — SIGNIFICANT CHANGE UP (ref 22–31)
CO2 SERPL-SCNC: 23 MMOL/L — SIGNIFICANT CHANGE UP (ref 22–31)
CREAT SERPL-MCNC: 0.4 MG/DL — LOW (ref 0.5–1.3)
CREAT SERPL-MCNC: 0.58 MG/DL — SIGNIFICANT CHANGE UP (ref 0.5–1.3)
EOSINOPHIL # BLD AUTO: 0.01 K/UL — SIGNIFICANT CHANGE UP (ref 0–0.5)
EOSINOPHIL NFR BLD AUTO: 0.1 % — SIGNIFICANT CHANGE UP (ref 0–6)
GLUCOSE SERPL-MCNC: 105 MG/DL — HIGH (ref 70–99)
GLUCOSE SERPL-MCNC: 105 MG/DL — HIGH (ref 70–99)
HCT VFR BLD CALC: 36.8 % — SIGNIFICANT CHANGE UP (ref 34.5–45)
HCT VFR BLD CALC: 39.4 % — SIGNIFICANT CHANGE UP (ref 34.5–45)
HGB BLD-MCNC: 12.6 G/DL — SIGNIFICANT CHANGE UP (ref 11.5–15.5)
HGB BLD-MCNC: 13.2 G/DL — SIGNIFICANT CHANGE UP (ref 11.5–15.5)
IGA FLD-MCNC: 786 MG/DL — HIGH (ref 84–499)
IGG FLD-MCNC: 1511 MG/DL — SIGNIFICANT CHANGE UP (ref 610–1660)
IGM SERPL-MCNC: 111 MG/DL — SIGNIFICANT CHANGE UP (ref 35–242)
IMM GRANULOCYTES NFR BLD AUTO: 0.6 % — SIGNIFICANT CHANGE UP (ref 0–1.5)
INR BLD: 1.4 RATIO — HIGH (ref 0.88–1.16)
KAPPA LC SER QL IFE: 5.27 MG/DL — HIGH (ref 0.33–1.94)
KAPPA/LAMBDA FREE LIGHT CHAIN RATIO, SERUM: 1.35 RATIO — SIGNIFICANT CHANGE UP (ref 0.26–1.65)
LAMBDA LC SER QL IFE: 3.9 MG/DL — HIGH (ref 0.57–2.63)
LYMPHOCYTES # BLD AUTO: 1.54 K/UL — SIGNIFICANT CHANGE UP (ref 1–3.3)
LYMPHOCYTES # BLD AUTO: 17 % — SIGNIFICANT CHANGE UP (ref 13–44)
MCHC RBC-ENTMCNC: 28.9 PG — SIGNIFICANT CHANGE UP (ref 27–34)
MCHC RBC-ENTMCNC: 29.2 PG — SIGNIFICANT CHANGE UP (ref 27–34)
MCHC RBC-ENTMCNC: 33.5 GM/DL — SIGNIFICANT CHANGE UP (ref 32–36)
MCHC RBC-ENTMCNC: 34.2 GM/DL — SIGNIFICANT CHANGE UP (ref 32–36)
MCV RBC AUTO: 85.4 FL — SIGNIFICANT CHANGE UP (ref 80–100)
MCV RBC AUTO: 86.2 FL — SIGNIFICANT CHANGE UP (ref 80–100)
MONOCYTES # BLD AUTO: 0.59 K/UL — SIGNIFICANT CHANGE UP (ref 0–0.9)
MONOCYTES NFR BLD AUTO: 6.5 % — SIGNIFICANT CHANGE UP (ref 2–14)
NEUTROPHILS # BLD AUTO: 6.83 K/UL — SIGNIFICANT CHANGE UP (ref 1.8–7.4)
NEUTROPHILS NFR BLD AUTO: 75.4 % — SIGNIFICANT CHANGE UP (ref 43–77)
NRBC # BLD: 0 /100 WBCS — SIGNIFICANT CHANGE UP (ref 0–0)
NRBC # BLD: 0 /100 WBCS — SIGNIFICANT CHANGE UP (ref 0–0)
PLATELET # BLD AUTO: 256 K/UL — SIGNIFICANT CHANGE UP (ref 150–400)
PLATELET # BLD AUTO: 289 K/UL — SIGNIFICANT CHANGE UP (ref 150–400)
POTASSIUM SERPL-MCNC: 3.7 MMOL/L — SIGNIFICANT CHANGE UP (ref 3.5–5.3)
POTASSIUM SERPL-MCNC: 3.7 MMOL/L — SIGNIFICANT CHANGE UP (ref 3.5–5.3)
POTASSIUM SERPL-SCNC: 3.7 MMOL/L — SIGNIFICANT CHANGE UP (ref 3.5–5.3)
POTASSIUM SERPL-SCNC: 3.7 MMOL/L — SIGNIFICANT CHANGE UP (ref 3.5–5.3)
PROT SERPL-MCNC: 6.1 G/DL — SIGNIFICANT CHANGE UP (ref 6–8.3)
PROT SERPL-MCNC: 6.3 G/DL — SIGNIFICANT CHANGE UP (ref 6–8.3)
PROTHROM AB SERPL-ACNC: 16.5 SEC — HIGH (ref 10.6–13.6)
RBC # BLD: 4.31 M/UL — SIGNIFICANT CHANGE UP (ref 3.8–5.2)
RBC # BLD: 4.57 M/UL — SIGNIFICANT CHANGE UP (ref 3.8–5.2)
RBC # FLD: 19.7 % — HIGH (ref 10.3–14.5)
RBC # FLD: 20.1 % — HIGH (ref 10.3–14.5)
SODIUM SERPL-SCNC: 132 MMOL/L — LOW (ref 135–145)
SODIUM SERPL-SCNC: 138 MMOL/L — SIGNIFICANT CHANGE UP (ref 135–145)
WBC # BLD: 10.42 K/UL — SIGNIFICANT CHANGE UP (ref 3.8–10.5)
WBC # BLD: 9.06 K/UL — SIGNIFICANT CHANGE UP (ref 3.8–10.5)
WBC # FLD AUTO: 10.42 K/UL — SIGNIFICANT CHANGE UP (ref 3.8–10.5)
WBC # FLD AUTO: 9.06 K/UL — SIGNIFICANT CHANGE UP (ref 3.8–10.5)

## 2021-09-30 PROCEDURE — 99223 1ST HOSP IP/OBS HIGH 75: CPT | Mod: GC

## 2021-09-30 RX ORDER — LEVOTHYROXINE SODIUM 125 MCG
0 TABLET ORAL
Qty: 0 | Refills: 0 | DISCHARGE

## 2021-09-30 RX ORDER — ATORVASTATIN CALCIUM 80 MG/1
0 TABLET, FILM COATED ORAL
Qty: 0 | Refills: 0 | DISCHARGE

## 2021-09-30 RX ORDER — ATORVASTATIN CALCIUM 80 MG/1
1 TABLET, FILM COATED ORAL
Qty: 0 | Refills: 0 | DISCHARGE

## 2021-09-30 RX ORDER — LEVOTHYROXINE SODIUM 125 MCG
1 TABLET ORAL
Qty: 0 | Refills: 0 | DISCHARGE

## 2021-09-30 RX ORDER — ACETYLCYSTEINE 200 MG/ML
10 VIAL (ML) MISCELLANEOUS ONCE
Refills: 0 | Status: COMPLETED | OUTPATIENT
Start: 2021-09-30 | End: 2021-09-30

## 2021-09-30 RX ORDER — ACETYLCYSTEINE 200 MG/ML
3.3 VIAL (ML) MISCELLANEOUS ONCE
Refills: 0 | Status: COMPLETED | OUTPATIENT
Start: 2021-09-30 | End: 2021-09-30

## 2021-09-30 RX ORDER — ACETYLCYSTEINE 200 MG/ML
7 VIAL (ML) MISCELLANEOUS
Refills: 0 | Status: DISCONTINUED | OUTPATIENT
Start: 2021-10-01 | End: 2021-10-02

## 2021-09-30 RX ORDER — ACETYLCYSTEINE 200 MG/ML
7 VIAL (ML) MISCELLANEOUS ONCE
Refills: 0 | Status: COMPLETED | OUTPATIENT
Start: 2021-09-30 | End: 2021-10-01

## 2021-09-30 RX ADMIN — GABAPENTIN 100 MILLIGRAM(S): 400 CAPSULE ORAL at 06:17

## 2021-09-30 RX ADMIN — Medication 300 GRAM(S): at 22:34

## 2021-09-30 RX ADMIN — LACTULOSE 30 GRAM(S): 10 SOLUTION ORAL at 13:13

## 2021-09-30 RX ADMIN — LACTULOSE 30 GRAM(S): 10 SOLUTION ORAL at 06:17

## 2021-09-30 RX ADMIN — POLYETHYLENE GLYCOL 3350 17 GRAM(S): 17 POWDER, FOR SOLUTION ORAL at 11:08

## 2021-09-30 RX ADMIN — Medication 129.13 GRAM(S): at 23:42

## 2021-09-30 RX ADMIN — Medication 25 MICROGRAM(S): at 06:17

## 2021-09-30 RX ADMIN — GABAPENTIN 100 MILLIGRAM(S): 400 CAPSULE ORAL at 21:26

## 2021-09-30 RX ADMIN — CHLORHEXIDINE GLUCONATE 1 APPLICATION(S): 213 SOLUTION TOPICAL at 11:02

## 2021-09-30 RX ADMIN — Medication 20 MILLIEQUIVALENT(S): at 11:08

## 2021-09-30 RX ADMIN — GABAPENTIN 100 MILLIGRAM(S): 400 CAPSULE ORAL at 13:12

## 2021-09-30 RX ADMIN — SENNA PLUS 2 TABLET(S): 8.6 TABLET ORAL at 21:26

## 2021-09-30 NOTE — PROGRESS NOTE ADULT - SUBJECTIVE AND OBJECTIVE BOX
Patient is a 60y old  Female who presents with a chief complaint of hepatitis, jaundice (30 Sep 2021 15:55)      SUBJECTIVE / OVERNIGHT EVENTS: severe hepatitis on pathology, probably autoimmune in nature, hepatology called, PT/INR rising, steroids switched to IV Medrol, will need close trending of LFTs, Ammonia PT/INR,     MEDICATIONS  (STANDING):  chlorhexidine 2% Cloths 1 Application(s) Topical daily  gabapentin 100 milliGRAM(s) Oral three times a day  influenza   Vaccine 0.5 milliLiter(s) IntraMuscular once  lactulose Syrup 30 Gram(s) Oral every 8 hours  levothyroxine 25 MICROGram(s) Oral daily  methylPREDNISolone sodium succinate Injectable 60 milliGRAM(s) IV Push daily  polyethylene glycol 3350 17 Gram(s) Oral daily  potassium chloride    Tablet ER 20 milliEquivalent(s) Oral daily  senna 2 Tablet(s) Oral at bedtime    MEDICATIONS  (PRN):      Vital Signs Last 24 Hrs  T(F): 98.1 (09-30-21 @ 15:35), Max: 98.3 (09-29-21 @ 20:17)  HR: 73 (09-30-21 @ 15:35) (52 - 76)  BP: 103/67 (09-30-21 @ 15:35) (103/67 - 123/83)  RR: 18 (09-30-21 @ 15:35) (18 - 18)  SpO2: 94% (09-30-21 @ 15:35) (93% - 96%)  Telemetry:   CAPILLARY BLOOD GLUCOSE        I&O's Summary    29 Sep 2021 07:01  -  30 Sep 2021 07:00  --------------------------------------------------------  IN: 620 mL / OUT: 0 mL / NET: 620 mL    30 Sep 2021 07:01  -  30 Sep 2021 19:20  --------------------------------------------------------  IN: 300 mL / OUT: 0 mL / NET: 300 mL        PHYSICAL EXAM:  GENERAL: NAD, well-developed  HEAD:  Atraumatic, Normocephalic  EYES: EOMI, PERRLA, conjunctiva and sclera clear  NECK: Supple, No JVD  CHEST/LUNG: Clear to auscultation bilaterally; No wheeze  HEART: Regular rate and rhythm; No murmurs, rubs, or gallops  ABDOMEN: Soft, Nontender, Nondistended; Bowel sounds present  EXTREMITIES:  2+ Peripheral Pulses, No clubbing, cyanosis, or edema  PSYCH: AAOx3  NEUROLOGY: non-focal  SKIN: No rashes or lesions    LABS:                        13.2   10.42 )-----------( 289      ( 30 Sep 2021 17:53 )             39.4     09-30    132<L>  |  98  |  7   ----------------------------<  105<H>  3.7   |  22  |  0.58    Ca    8.1<L>      30 Sep 2021 17:53    TPro  6.3  /  Alb  2.9<L>  /  TBili  15.8<H>  /  DBili  x   /  AST  1780<H>  /  ALT  1671<H>  /  AlkPhos  368<H>  09-30    PT/INR - ( 30 Sep 2021 17:53 )   PT: 16.5 sec;   INR: 1.40 ratio                   RADIOLOGY & ADDITIONAL TESTS:    Imaging Personally Reviewed:    Consultant(s) Notes Reviewed:      Care Discussed with Consultants/Other Providers:

## 2021-09-30 NOTE — CHART NOTE - NSCHARTNOTEFT_GEN_A_CORE
Repeat INR 1.4. INR progressively increasing. Will start NAC for severe acute liver injury at this time. Please noted NAC protocol below. Please sent PT/INR in AM.    - Start N-acetylcysteine for 5-days as per the following protocol:    Day 1: Give 150 mg per kilogram of body weight in 250 ml of 5% glucose solution over a period of 30 minutes, 50 mg per kilogram in 500 ml of glucose solution over a period of 4 hours, and 100 mg per kilogram in 1000 ml of glucose solution over a period of 16 hours.   Days 2 - 5: Give 100 mg per kilogram per day in 1000 ml of glucose solution.    Discussed with ACP team      Arely Turner PGY-6  Gastroenterology/Hepatology Fellow  Page #[phone]   Page #[phone] 5pm-7am on weekdays, and on weekends  '

## 2021-09-30 NOTE — CONSULT NOTE ADULT - SUBJECTIVE AND OBJECTIVE BOX
HPI:  RIC GARCIA is a 60 year old female with history of COPD, chronic back pain, and hypothyroidism who presents with jaundice.  Hepatology consulted for elevated liver chemistries.    Patient denies history of liver disease or elevated liver tests.  She endorses symptoms of "darker skin" over the past 6-8 weeks that she though was a tan.  She subsequently developed dark urine and abdominal pain ~2 weeks prior to admission.  She only takes Synthroid and Atorvastatin at home.  She denies over the counter medications, herbal remedies, or dietary supplements.  She states she drinks very rarely, approximately 2 margaritas per year.  Otherwise, patient denies fevers, chills, weight loss, dysphagia, odynophagia, early satiety, poor oral intake, nausea, vomiting, diarrhea, melena, hematemesis, hematochezia, change in stool caliber, or family history of GI-related cancers or liver disease.    Prior endoscopy:  None      Prior colonoscopy:  None    ROS:   General:  No fevers, chills, night sweats  Eyes:  Good vision, no reported pain  ENT:  No sore throat, pain, runny nose  CV:  No pain, palpitations  Pulm:  No dyspnea, cough  GI:  See HPI, otherwise negative  :  No incontinence, nocturia  Muscle:  No reported pain, weakness  Neuro:  No memory problems  Psych:  No insomnia, psychosis  Endocrine:  No polyuria, polydipsia  Heme:  No petechiae, ecchymosis, easy bruisability  Skin:  +jaundice.  No reported rash    PMHX/PSHX:    Hypothyroidism    COPD not affecting current episode of care    Adrenal adenoma    Low back pain    DJD (degenerative joint disease)    Nicotine addiction      Allergies:  penicillin (Rash)      Home Medications: reviewed  Hospital Medications:  chlorhexidine 2% Cloths 1 Application(s) Topical daily  gabapentin 100 milliGRAM(s) Oral three times a day  influenza   Vaccine 0.5 milliLiter(s) IntraMuscular once  lactulose Syrup 30 Gram(s) Oral every 8 hours  levothyroxine 25 MICROGram(s) Oral daily  polyethylene glycol 3350 17 Gram(s) Oral daily  potassium chloride    Tablet ER 20 milliEquivalent(s) Oral daily  predniSONE   Tablet 40 milliGRAM(s) Oral daily  senna 2 Tablet(s) Oral at bedtime      Social History:   Tobacco: Denies  EtOH: very rarely [approximately 2 margaritas per year]  Illicit Drugs: Denies    Family history:      Denies family history of colon cancer/polyps, stomach cancer/polyps, pancreatic cancer/masses, liver cancer/disease, ovarian cancer and endometrial cancer.    PHYSICAL EXAM:   Vital Signs:  Vital Signs Last 24 Hrs  T(C): 36.7 (30 Sep 2021 15:35), Max: 36.8 (29 Sep 2021 20:17)  T(F): 98.1 (30 Sep 2021 15:35), Max: 98.3 (29 Sep 2021 20:17)  HR: 73 (30 Sep 2021 15:35) (52 - 76)  BP: 103/67 (30 Sep 2021 15:35) (103/67 - 123/83)  BP(mean): --  RR: 18 (30 Sep 2021 15:35) (18 - 18)  SpO2: 94% (30 Sep 2021 15:35) (93% - 96%)  Daily     Daily     GENERAL: no acute distress  NEURO: alert  HEENT: icteric sclera, no conjunctival pallor appreciated  CHEST: no respiratory distress, no accessory muscle use  CARDIAC: regular rate, +S1/S2  ABDOMEN: soft, nondistended, nontender, no rebound or guarding  EXTREMITIES: warm, well perfused, no edema  SKIN: jaundice, no lesions noted    LABS: reviewed                        12.6   9.06  )-----------( 256      ( 30 Sep 2021 07:23 )             36.8     09-30    138  |  101  |  6<L>  ----------------------------<  105<H>  3.7   |  23  |  0.40<L>    Ca    8.6      30 Sep 2021 07:23    TPro  6.1  /  Alb  2.8<L>  /  TBili  14.6<H>  /  DBili  >10.0<H>  /  AST  1622<H>  /  ALT  1514<H>  /  AlkPhos  346<H>  09-30    LIVER FUNCTIONS - ( 30 Sep 2021 07:23 )  Alb: 2.8 g/dL / Pro: 6.1 g/dL / ALK PHOS: 346 U/L / ALT: 1514 U/L / AST: 1622 U/L / GGT: x               Diagnostic Studies: see sunrise for full report

## 2021-09-30 NOTE — PROGRESS NOTE ADULT - SUBJECTIVE AND OBJECTIVE BOX
[facility]      Seen earlier today, and examined.  - Today, patient is without complaints.           *****MEDICATIONS: Current medication reviewed and documented.    MEDICATIONS  (STANDING):  chlorhexidine 2% Cloths 1 Application(s) Topical daily  gabapentin 100 milliGRAM(s) Oral three times a day  influenza   Vaccine 0.5 milliLiter(s) IntraMuscular once  lactulose Syrup 30 Gram(s) Oral every 8 hours  levothyroxine 25 MICROGram(s) Oral daily  polyethylene glycol 3350 17 Gram(s) Oral daily  potassium chloride    Tablet ER 20 milliEquivalent(s) Oral daily  predniSONE   Tablet 40 milliGRAM(s) Oral daily  senna 2 Tablet(s) Oral at bedtime    MEDICATIONS  (PRN):          ***** VITAL SIGNS:  T(F): 97.6 (21 @ 10:41), Max: 98.3 (21 @ 20:17)  HR: 73 (21 @ 10:41) (52 - 76)  BP: 123/83 (21 @ 10:41) (107/71 - 123/83)  RR: 18 (21 @ 10:41) (18 - 18)  SpO2: 96% (21 @ 10:41) (93% - 96%)  Wt(kg): --  ,   I&O's Summary    29 Sep 2021 07:01  -  30 Sep 2021 07:00  --------------------------------------------------------  IN: 620 mL / OUT: 0 mL / NET: 620 mL    30 Sep 2021 07:01  -  30 Sep 2021 13:45  --------------------------------------------------------  IN: 300 mL / OUT: 0 mL / NET: 300 mL             *****PHYSICAL EXAM:Alert oriented x3  Attention comprehension are limited  . Able to name, repeat,  without any difficulty.   Able to follow 1 step commands.     EOMI fundi not visualized,   No facial asymmetry   Tongue is midline     Moving all 4 ext symmetrically   no limitation of straight leg raising        Gait : not assessed.  B/L down going toes            *****LAB AND IMAGIN.6   9.06  )-----------( 256      ( 30 Sep 2021 07:23 )             36.8                   138  |  101  |  6<L>  ----------------------------<  105<H>  3.7   |  23  |  0.40<L>    Ca    8.6      30 Sep 2021 07:23    TPro  6.1  /  Alb  2.8<L>  /  TBili  14.6<H>  /  DBili  >10.0<H>  /  AST  1622<H>  /  ALT  1514<H>  /  AlkPhos  346<H>                           [All pertinent recent Imaging/Reports reviewed]           *****A S S E S S M E N T   A N D   P L A N :    Excerpt from H&P,"    60F with PMH of chronic lower back pain 2/2 degenerative changes, chronic smoker, h/o COPD, noncompliant with treatment, had a CT C/A/P done in 2021 2/2 chronic pulmonary nodules and right adrenal adenoma monitoring.  ptn states for the past 2 weeks her urine is brown, she feels nauseous, has diffuse abdominal and back pain, denies dysuria. states she has fevers but hasnt checked her temps. states she has chills  One episode of NBNB emesis 2 weeks ago. Reports constipation with occasional soft stools. never had a colonoscopy, refused COVID vaccine, has a covid swab today at an urgi center: negative. Denies taking Tylenol.    Denies chest pain, leg swelling or pain.          Problem/Recommendations 1: back pain   chronic   continue gabapentin 100 tid   continue lactulose monitor bms  no complaints of back pain      Problem/Recommendations 2:  transaminitis   trend   pt reports taking lipitor previously   biopsy results pending   lfts trending down.   rising bili          Thank you for allowing me to participate in the care of this patient. Will continue to follow patient periodically. Please do not hesitate to call me if you have any  questions or if there has been a change in patients neurological status     ________________  Ruth Chung MD  [facility]  [phone]      33 minutes spent on total encounter; more than 50 % of the visit was  spent counseling about plan of care, compliance to diet/exercise and medication regimen and or  coordinating care by the attending physician.      It is advised that stroke patients follow up with WALTER Weaver @ [phone] in 1- 2 weeks.   Others please follow up with Dr. Michael Nissenbaum [phone]

## 2021-09-30 NOTE — PROGRESS NOTE ADULT - SUBJECTIVE AND OBJECTIVE BOX
Patient is a 60y Female     Patient is a 60y old  Female who presents with a chief complaint of hepatitis, jaundice (29 Sep 2021 18:03)      HPI:   60F with PMH of chronic lower back pain 2/2 degenerative changes, chronic smoker, h/o COPD, noncompliant with treatment, had a CT C/A/P done in 4/2021 2/2 chronic pulmonary nodules and right adrenal adenoma monitoring.  ptn states for the past 2 weeks her urine is brown, she feels nauseous, has diffuse abdominal and back pain, denies dysuria. states she has fevers but hasnt checked her temps. states she has chills  One episode of NBNB emesis 2 weeks ago. Reports constipation with occasional soft stools. never had a colonoscopy, refused COVID vaccine, [facility]. Denies taking Tylenol.    Denies chest pain, leg swelling or pain. (24 Sep 2021 18:33)      PAST MEDICAL & SURGICAL HISTORY:  Hypothyroidism    COPD not affecting current episode of care    Adrenal adenoma    Low back pain    DJD (degenerative joint disease)    Nicotine addiction        MEDICATIONS  (STANDING):  chlorhexidine 2% Cloths 1 Application(s) Topical daily  gabapentin 100 milliGRAM(s) Oral three times a day  influenza   Vaccine 0.5 milliLiter(s) IntraMuscular once  lactulose Syrup 30 Gram(s) Oral every 8 hours  levothyroxine 25 MICROGram(s) Oral daily  polyethylene glycol 3350 17 Gram(s) Oral daily  potassium chloride    Tablet ER 20 milliEquivalent(s) Oral daily  predniSONE   Tablet 40 milliGRAM(s) Oral daily  senna 2 Tablet(s) Oral at bedtime      Allergies    penicillin (Rash)    Intolerances        SOCIAL HISTORY:  Denies ETOh,Smoking,     FAMILY HISTORY:      REVIEW OF SYSTEMS:    CONSTITUTIONAL: No weakness, fevers or chills  EYES/ENT: No visual changes;  No vertigo or throat pain   NECK: No pain or stiffness  RESPIRATORY: No cough, wheezing, hemoptysis; No shortness of breath  CARDIOVASCULAR: No chest pain or palpitations  GASTROINTESTINAL: No abdominal or epigastric pain. No nausea, vomiting, or hematemesis; No diarrhea or constipation. No melena or hematochezia.  GENITOURINARY: No dysuria, frequency or hematuria  NEUROLOGICAL: No numbness or weakness  SKIN: No itching, burning, rashes, or lesions   All other review of systems is negative unless indicated above.    VITAL:  T(C): , Max: 37.2 (09-29-21 @ 09:58)  T(F): , Max: 99 (09-29-21 @ 09:58)  HR: 52 (09-30-21 @ 06:13)  BP: 109/68 (09-30-21 @ 06:13)  BP(mean): --  RR: 18 (09-30-21 @ 06:13)  SpO2: 94% (09-30-21 @ 06:13)  Wt(kg): --    I and O's:    09-28 @ 07:01  -  09-29 @ 07:00  --------------------------------------------------------  IN: 0 mL / OUT: 0 mL / NET: 0 mL    09-29 @ 07:01  -  09-30 @ 07:00  --------------------------------------------------------  IN: 620 mL / OUT: 0 mL / NET: 620 mL          PHYSICAL EXAM:    Constitutional: NAD  HEENT: PERRLA,   Neck: No JVD  Respiratory: CTA B/L  Cardiovascular: S1 and S2  Gastrointestinal: BS+, soft, NT/ND  Extremities: No peripheral edema  Neurological: A/O x 3, no focal deficits  Psychiatric: Normal mood, normal affect  : No Cee  Skin: No rashes  Access: Not applicable  Back: No CVA tenderness    LABS:                        13.4   7.73  )-----------( 232      ( 29 Sep 2021 09:07 )             38.1     09-29    136  |  101  |  4<L>  ----------------------------<  102<H>  3.2<L>   |  23  |  0.45<L>    Ca    8.2<L>      29 Sep 2021 06:56    TPro  5.8<L>  /  Alb  2.7<L>  /  TBili  14.2<H>  /  DBili  >10.0<H>  /  AST  2159<H>  /  ALT  1557<H>  /  AlkPhos  349<H>  09-29          RADIOLOGY & ADDITIONAL STUDIES:

## 2021-09-30 NOTE — CONSULT NOTE ADULT - ASSESSMENT
60 year old female with history of COPD, chronic back pain, and hypothyroidism who presents with jaundice.  Hepatology consulted for elevated liver chemistries and liver biopsy concerning for autoimmune hepatitis.    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  She only takes Synthroid and Atorvastatin at home, and denies over the counter medications, herbal remedies, or dietary supplements  MRCP 9/25/2021 reveals cirrhosis.  Liver chemistries with peak elevations of ALT 1500, AST 2300, TB 14.6, .  Liver biopsy performed 9/28/2021.    Recommendations:  -trend CBC, CMP, and INR  -f/u final pathology report from liver biopsy, although reportedly concerning for autoimmune hepatitis and already placed on prednisone per private GI  -assess for autoimmune etiology with FARIDEH [antinuclear antibody], ASMA [anti smooth muscle antibody], anti-LKM [liver kidney microsomal antibody], anti-sLA [soluble liver antigen antibody], and quantitative immunoglobulins [IgG, IgA, IgM]      Mejia Corona, PGY-4  GI/Hepatology Fellow    MONDAY-FRIDAY 8AM-5PM:  Pager# [phone] ([facility]) or [phone] ([facility])    NON-URGENT CONSULTS:  Please email [email or web address]  AT NIGHT AND ON WEEKENDS:  Contact on-call GI fellow via answering service ([phone]) from 5pm-8am and on weekends/holidays             60 year old female with history of COPD, chronic back pain, and hypothyroidism who presents with jaundice.  Hepatology consulted for elevated liver chemistries and liver biopsy concerning for autoimmune hepatitis.    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  She only takes Synthroid and Atorvastatin at home, and denies over the counter medications, herbal remedies, or dietary supplements  MRCP 9/25/2021 reveals cirrhosis.  Liver chemistries with peak elevations of ALT 1500, AST 2300, TB 14.6, .  Liver biopsy performed 9/28/2021.  HAV IgM, HBsAg, HBcAb, and HCV Ab nonreactive.  +FARIDEH 1:640, +ASMA however only 1:40, and negative AMA.    Recommendations:  -trend CBC, CMP, and INR  -f/u final pathology report from liver biopsy, although reportedly concerning for autoimmune hepatitis and already placed on prednisone per private GI  -assess for autoimmune etiology with anti-LKM [liver kidney microsomal antibody], anti-sLA [soluble liver antigen antibody], and quantitative immunoglobulins [IgG, IgA, IgM]  -check HBV PCR, HCV PCR    Mejia Corona, PGY-4  GI/Hepatology Fellow    MONDAY-FRIDAY 8AM-5PM:  Pager# [phone] ([facility]) or [phone] ([facility])    NON-URGENT CONSULTS:  Please email [email or web address]  AT NIGHT AND ON WEEKENDS:  Contact on-call GI fellow via answering service ([phone]) from 5pm-8am and on weekends/holidays             60 year old female with history of COPD, chronic back pain, and hypothyroidism who presents with jaundice.  Hepatology consulted for elevated liver chemistries and liver biopsy concerning for autoimmune hepatitis.    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  She only takes Synthroid and Atorvastatin at home, and denies over the counter medications, herbal remedies, or dietary supplements  MRCP 9/25/2021 reveals cirrhosis.  Liver chemistries with peak elevations of ALT 1500, AST 2300, TB 14.6, .  Liver biopsy performed 9/28/2021.  HAV IgM, HBsAg, HBcAb, and HCV Ab nonreactive.  +FARIDEH 1:640, +ASMA however only 1:40, and negative AMA.    Recommendations:  -trend CBC, CMP, and INR [STAT labs now]  -f/u final pathology report from liver biopsy, although reportedly concerning for autoimmune hepatitis and already placed on prednisone [started 9/29/2021- ] per private GI  -assess for autoimmune etiology with anti-LKM [liver kidney microsomal antibody], anti-sLA [soluble liver antigen antibody], and quantitative immunoglobulins [IgG, IgA, IgM]  -check HBV PCR, HCV PCR  -send TPMT enzyme activity  -discontinue prednisone and initiate SoluMedrol 60mg IV [first dose now]    Mejia Corona, PGY-4  GI/Hepatology Fellow    MONDAY-FRIDAY 8AM-5PM:  Pager# [phone] ([facility]) or [phone] ([facility])    NON-URGENT CONSULTS:  Please email [email or web address] OR [email or web address]  AT NIGHT AND ON WEEKENDS:  Contact on-call GI fellow via answering service ([phone]) from 5pm-8am and on weekends/holidays             60 year old female with history of COPD, chronic back pain, and hypothyroidism who presents with jaundice.  Hepatology consulted for elevated liver chemistries and liver biopsy concerning for autoimmune hepatitis.    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  She only takes Synthroid and Atorvastatin at home, and denies over the counter medications, herbal remedies, or dietary supplements  MRCP 9/25/2021 reveals cirrhosis.  Liver chemistries with peak elevations of ALT 1500, AST 2300, TB 14.6, .  Liver biopsy performed 9/28/2021.  HAV IgM, HBsAg, HBcAb, and HCV Ab nonreactive.  +FARIDEH 1:640, +ASMA however only 1:40, and negative AMA.    Recommendations:  -trend CBC, CMP, and INR [STAT labs now]  -f/u final pathology report from liver biopsy, although reportedly concerning for autoimmune hepatitis vs DILI and already placed on prednisone [started 9/29/2021- ]  -assess for autoimmune etiology with anti-LKM [liver kidney microsomal antibody], anti-sLA [soluble liver antigen antibody], and quantitative immunoglobulins [IgG, IgA, IgM]  -check HBV PCR, HCV PCR  -send TPMT enzyme activity  -discontinue prednisone and initiate SoluMedrol 60mg IV [first dose now]      Mejia Corona, PGY-4  GI/Hepatology Fellow    MONDAY-FRIDAY 8AM-5PM:  Pager# [phone] ([facility]) or [phone] ([facility])    NON-URGENT CONSULTS:  Please email [email or web address]  AT NIGHT AND ON WEEKENDS:  Contact on-call GI fellow via answering service ([phone]) from 5pm-8am and on weekends/holidays

## 2021-09-30 NOTE — CONSULT NOTE ADULT - ATTENDING COMMENTS
61 y/o F with history of COPD and hypothyroidism presenting with jaundice, dark urine on 9/24/21 found to have severe mixed hepatocellular and cholestatic liver injury and rising INR consistent with severe acute liver injury that could be developing into acute liver failure.    She is currently AAO x 3 without asterixis but she needs to be monitored closely for encephalopathy especially if her INR continues to uptrend.  Recommend daily liver enzymes and PT/INR.  Liver biopsy preliminary reviewed today and signed out to Dr. Cox from GI as showing necrosis, some interface hepatitis with plasma cells concerning for autoimmune hepatitis vs drug-induced liver injury.  Her clinical picture to me is consistent with either drug-induced autoimmune hepatitis (perhaps lipitor) vs de cory given her highly positive FARIDEH and positive ASMA.  She was started on prednisone 40 mg daily (9/29- ) however I have recommended increasing it to solumedrol 60 mg IV daily for now.  I will try to review her biopsy tomorrow with pathology to try to get a prognosis on her liver in terms of degree of necrosis.

## 2021-10-01 PROBLEM — M19.90 UNSPECIFIED OSTEOARTHRITIS, UNSPECIFIED SITE: Chronic | Status: ACTIVE | Noted: 2021-09-24

## 2021-10-01 PROBLEM — M54.5 LOW BACK PAIN: Chronic | Status: ACTIVE | Noted: 2021-09-24

## 2021-10-01 PROBLEM — E03.9 HYPOTHYROIDISM, UNSPECIFIED: Chronic | Status: ACTIVE | Noted: 2021-09-24

## 2021-10-01 PROBLEM — J44.9 CHRONIC OBSTRUCTIVE PULMONARY DISEASE, UNSPECIFIED: Chronic | Status: ACTIVE | Noted: 2021-09-24

## 2021-10-01 PROBLEM — D35.00 BENIGN NEOPLASM OF UNSPECIFIED ADRENAL GLAND: Chronic | Status: ACTIVE | Noted: 2021-09-24

## 2021-10-01 PROBLEM — F17.200 NICOTINE DEPENDENCE, UNSPECIFIED, UNCOMPLICATED: Chronic | Status: ACTIVE | Noted: 2021-09-24

## 2021-10-01 LAB
ALBUMIN SERPL ELPH-MCNC: 2.7 G/DL — LOW (ref 3.3–5)
ALBUMIN SERPL ELPH-MCNC: 2.7 G/DL — LOW (ref 3.3–5)
ALBUMIN SERPL ELPH-MCNC: 2.9 G/DL — LOW (ref 3.3–5)
ALP SERPL-CCNC: 302 U/L — HIGH (ref 40–120)
ALP SERPL-CCNC: 306 U/L — HIGH (ref 40–120)
ALP SERPL-CCNC: 318 U/L — HIGH (ref 40–120)
ALT FLD-CCNC: 1532 U/L — HIGH (ref 10–45)
ALT FLD-CCNC: 1581 U/L — HIGH (ref 10–45)
ALT FLD-CCNC: 1714 U/L — HIGH (ref 10–45)
AMMONIA BLD-MCNC: 42 UMOL/L — SIGNIFICANT CHANGE UP (ref 11–55)
AMMONIA BLD-MCNC: 46 UMOL/L — SIGNIFICANT CHANGE UP (ref 11–55)
ANION GAP SERPL CALC-SCNC: 11 MMOL/L — SIGNIFICANT CHANGE UP (ref 5–17)
ANION GAP SERPL CALC-SCNC: 13 MMOL/L — SIGNIFICANT CHANGE UP (ref 5–17)
APTT BLD: 31.1 SEC — SIGNIFICANT CHANGE UP (ref 27.5–35.5)
APTT BLD: 32.3 SEC — SIGNIFICANT CHANGE UP (ref 27.5–35.5)
AST SERPL-CCNC: 1246 U/L — HIGH (ref 10–40)
AST SERPL-CCNC: 1490 U/L — HIGH (ref 10–40)
AST SERPL-CCNC: 1866 U/L — HIGH (ref 10–40)
BASE EXCESS BLDV CALC-SCNC: 2.6 MMOL/L — HIGH (ref -2–2)
BASE EXCESS BLDV CALC-SCNC: 4.6 MMOL/L — HIGH (ref -2–2)
BILIRUB DIRECT SERPL-MCNC: >10 MG/DL — HIGH (ref 0–0.2)
BILIRUB INDIRECT FLD-MCNC: <4.6 MG/DL — HIGH (ref 0.2–1)
BILIRUB SERPL-MCNC: 13.4 MG/DL — HIGH (ref 0.2–1.2)
BILIRUB SERPL-MCNC: 14.6 MG/DL — HIGH (ref 0.2–1.2)
BILIRUB SERPL-MCNC: 15.2 MG/DL — HIGH (ref 0.2–1.2)
BUN SERPL-MCNC: 7 MG/DL — SIGNIFICANT CHANGE UP (ref 7–23)
BUN SERPL-MCNC: 7 MG/DL — SIGNIFICANT CHANGE UP (ref 7–23)
CALCIUM SERPL-MCNC: 8.4 MG/DL — SIGNIFICANT CHANGE UP (ref 8.4–10.5)
CALCIUM SERPL-MCNC: 8.6 MG/DL — SIGNIFICANT CHANGE UP (ref 8.4–10.5)
CHLORIDE SERPL-SCNC: 101 MMOL/L — SIGNIFICANT CHANGE UP (ref 96–108)
CHLORIDE SERPL-SCNC: 99 MMOL/L — SIGNIFICANT CHANGE UP (ref 96–108)
CO2 BLDV-SCNC: 28 MMOL/L — HIGH (ref 22–26)
CO2 BLDV-SCNC: 30 MMOL/L — HIGH (ref 22–26)
CO2 SERPL-SCNC: 24 MMOL/L — SIGNIFICANT CHANGE UP (ref 22–31)
CO2 SERPL-SCNC: 25 MMOL/L — SIGNIFICANT CHANGE UP (ref 22–31)
CREAT SERPL-MCNC: 0.44 MG/DL — LOW (ref 0.5–1.3)
CREAT SERPL-MCNC: 0.46 MG/DL — LOW (ref 0.5–1.3)
FIBRINOGEN PPP-MCNC: 184 MG/DL — LOW (ref 290–520)
FIBRINOGEN PPP-MCNC: 190 MG/DL — LOW (ref 290–520)
GAS PNL BLDV: SIGNIFICANT CHANGE UP
GLUCOSE SERPL-MCNC: 186 MG/DL — HIGH (ref 70–99)
GLUCOSE SERPL-MCNC: 215 MG/DL — HIGH (ref 70–99)
HCO3 BLDV-SCNC: 27 MMOL/L — SIGNIFICANT CHANGE UP (ref 22–29)
HCO3 BLDV-SCNC: 28 MMOL/L — SIGNIFICANT CHANGE UP (ref 22–29)
HCT VFR BLD CALC: 35.5 % — SIGNIFICANT CHANGE UP (ref 34.5–45)
HCT VFR BLD CALC: 37.4 % — SIGNIFICANT CHANGE UP (ref 34.5–45)
HCT VFR BLD CALC: 37.7 % — SIGNIFICANT CHANGE UP (ref 34.5–45)
HCV RNA SERPL NAA DL=5-ACNC: SIGNIFICANT CHANGE UP IU/ML
HCV RNA SPEC NAA+PROBE-LOG IU: SIGNIFICANT CHANGE UP LOG10IU/ML
HGB BLD-MCNC: 12.3 G/DL — SIGNIFICANT CHANGE UP (ref 11.5–15.5)
HGB BLD-MCNC: 12.7 G/DL — SIGNIFICANT CHANGE UP (ref 11.5–15.5)
HGB BLD-MCNC: 13.3 G/DL — SIGNIFICANT CHANGE UP (ref 11.5–15.5)
INR BLD: 1.39 RATIO — HIGH (ref 0.88–1.16)
INR BLD: 1.39 RATIO — HIGH (ref 0.88–1.16)
INR BLD: 1.53 RATIO — HIGH (ref 0.88–1.16)
LACTATE SERPL-SCNC: 2.2 MMOL/L — HIGH (ref 0.7–2)
LACTATE SERPL-SCNC: 3 MMOL/L — HIGH (ref 0.7–2)
MCHC RBC-ENTMCNC: 28.9 PG — SIGNIFICANT CHANGE UP (ref 27–34)
MCHC RBC-ENTMCNC: 29.2 PG — SIGNIFICANT CHANGE UP (ref 27–34)
MCHC RBC-ENTMCNC: 29.8 PG — SIGNIFICANT CHANGE UP (ref 27–34)
MCHC RBC-ENTMCNC: 34 GM/DL — SIGNIFICANT CHANGE UP (ref 32–36)
MCHC RBC-ENTMCNC: 34.6 GM/DL — SIGNIFICANT CHANGE UP (ref 32–36)
MCHC RBC-ENTMCNC: 35.3 GM/DL — SIGNIFICANT CHANGE UP (ref 32–36)
MCV RBC AUTO: 84.3 FL — SIGNIFICANT CHANGE UP (ref 80–100)
MCV RBC AUTO: 84.3 FL — SIGNIFICANT CHANGE UP (ref 80–100)
MCV RBC AUTO: 85.2 FL — SIGNIFICANT CHANGE UP (ref 80–100)
NRBC # BLD: 0 /100 WBCS — SIGNIFICANT CHANGE UP (ref 0–0)
PCO2 BLDV: 38 MMHG — LOW (ref 39–42)
PCO2 BLDV: 41 MMHG — SIGNIFICANT CHANGE UP (ref 39–42)
PH BLDV: 7.43 — SIGNIFICANT CHANGE UP (ref 7.32–7.43)
PH BLDV: 7.48 — HIGH (ref 7.32–7.43)
PLATELET # BLD AUTO: 259 K/UL — SIGNIFICANT CHANGE UP (ref 150–400)
PLATELET # BLD AUTO: 267 K/UL — SIGNIFICANT CHANGE UP (ref 150–400)
PLATELET # BLD AUTO: 307 K/UL — SIGNIFICANT CHANGE UP (ref 150–400)
PO2 BLDV: 67 MMHG — HIGH (ref 25–45)
PO2 BLDV: 72 MMHG — HIGH (ref 25–45)
POTASSIUM SERPL-MCNC: 3.3 MMOL/L — LOW (ref 3.5–5.3)
POTASSIUM SERPL-MCNC: 3.4 MMOL/L — LOW (ref 3.5–5.3)
POTASSIUM SERPL-SCNC: 3.3 MMOL/L — LOW (ref 3.5–5.3)
POTASSIUM SERPL-SCNC: 3.4 MMOL/L — LOW (ref 3.5–5.3)
PROT SERPL-MCNC: 5.9 G/DL — LOW (ref 6–8.3)
PROT SERPL-MCNC: 5.9 G/DL — LOW (ref 6–8.3)
PROT SERPL-MCNC: 6.4 G/DL — SIGNIFICANT CHANGE UP (ref 6–8.3)
PROTHROM AB SERPL-ACNC: 16.4 SEC — HIGH (ref 10.6–13.6)
PROTHROM AB SERPL-ACNC: 16.4 SEC — HIGH (ref 10.6–13.6)
PROTHROM AB SERPL-ACNC: 18 SEC — HIGH (ref 10.6–13.6)
RBC # BLD: 4.21 M/UL — SIGNIFICANT CHANGE UP (ref 3.8–5.2)
RBC # BLD: 4.39 M/UL — SIGNIFICANT CHANGE UP (ref 3.8–5.2)
RBC # BLD: 4.47 M/UL — SIGNIFICANT CHANGE UP (ref 3.8–5.2)
RBC # FLD: 19.7 % — HIGH (ref 10.3–14.5)
RBC # FLD: 19.9 % — HIGH (ref 10.3–14.5)
RBC # FLD: 20 % — HIGH (ref 10.3–14.5)
SAO2 % BLDV: 94.9 % — HIGH (ref 67–88)
SAO2 % BLDV: 95.5 % — HIGH (ref 67–88)
SODIUM SERPL-SCNC: 136 MMOL/L — SIGNIFICANT CHANGE UP (ref 135–145)
SODIUM SERPL-SCNC: 137 MMOL/L — SIGNIFICANT CHANGE UP (ref 135–145)
WBC # BLD: 7.64 K/UL — SIGNIFICANT CHANGE UP (ref 3.8–10.5)
WBC # BLD: 8.24 K/UL — SIGNIFICANT CHANGE UP (ref 3.8–10.5)
WBC # BLD: 8.46 K/UL — SIGNIFICANT CHANGE UP (ref 3.8–10.5)
WBC # FLD AUTO: 7.64 K/UL — SIGNIFICANT CHANGE UP (ref 3.8–10.5)
WBC # FLD AUTO: 8.24 K/UL — SIGNIFICANT CHANGE UP (ref 3.8–10.5)
WBC # FLD AUTO: 8.46 K/UL — SIGNIFICANT CHANGE UP (ref 3.8–10.5)

## 2021-10-01 PROCEDURE — 93306 TTE W/DOPPLER COMPLETE: CPT | Mod: 26

## 2021-10-01 PROCEDURE — 99232 SBSQ HOSP IP/OBS MODERATE 35: CPT

## 2021-10-01 RX ORDER — GABAPENTIN 400 MG/1
100 CAPSULE ORAL AT BEDTIME
Refills: 0 | Status: DISCONTINUED | OUTPATIENT
Start: 2021-10-02 | End: 2021-10-05

## 2021-10-01 RX ORDER — POTASSIUM CHLORIDE 20 MEQ
40 PACKET (EA) ORAL ONCE
Refills: 0 | Status: DISCONTINUED | OUTPATIENT
Start: 2021-10-01 | End: 2021-10-05

## 2021-10-01 RX ORDER — PHYTONADIONE (VIT K1) 5 MG
10 TABLET ORAL ONCE
Refills: 0 | Status: COMPLETED | OUTPATIENT
Start: 2021-10-01 | End: 2021-10-01

## 2021-10-01 RX ORDER — URSODIOL 250 MG/1
500 TABLET, FILM COATED ORAL EVERY 12 HOURS
Refills: 0 | Status: DISCONTINUED | OUTPATIENT
Start: 2021-10-01 | End: 2021-10-05

## 2021-10-01 RX ORDER — SODIUM CHLORIDE 9 MG/ML
1000 INJECTION INTRAMUSCULAR; INTRAVENOUS; SUBCUTANEOUS
Refills: 0 | Status: DISCONTINUED | OUTPATIENT
Start: 2021-10-01 | End: 2021-10-05

## 2021-10-01 RX ORDER — THIAMINE MONONITRATE (VIT B1) 100 MG
100 TABLET ORAL DAILY
Refills: 0 | Status: DISCONTINUED | OUTPATIENT
Start: 2021-10-01 | End: 2021-10-05

## 2021-10-01 RX ADMIN — Medication 102 MILLIGRAM(S): at 10:02

## 2021-10-01 RX ADMIN — Medication 60 MILLIGRAM(S): at 05:19

## 2021-10-01 RX ADMIN — Medication 64.69 GRAM(S): at 03:50

## 2021-10-01 RX ADMIN — Medication 25 MICROGRAM(S): at 05:19

## 2021-10-01 RX ADMIN — Medication 100 MILLIGRAM(S): at 18:11

## 2021-10-01 RX ADMIN — LACTULOSE 30 GRAM(S): 10 SOLUTION ORAL at 14:33

## 2021-10-01 RX ADMIN — SODIUM CHLORIDE 75 MILLILITER(S): 9 INJECTION INTRAMUSCULAR; INTRAVENOUS; SUBCUTANEOUS at 21:02

## 2021-10-01 RX ADMIN — GABAPENTIN 100 MILLIGRAM(S): 400 CAPSULE ORAL at 05:19

## 2021-10-01 RX ADMIN — SENNA PLUS 2 TABLET(S): 8.6 TABLET ORAL at 21:04

## 2021-10-01 RX ADMIN — URSODIOL 500 MILLIGRAM(S): 250 TABLET, FILM COATED ORAL at 18:11

## 2021-10-01 RX ADMIN — Medication 20 MILLIEQUIVALENT(S): at 11:20

## 2021-10-01 RX ADMIN — Medication 43.13 GRAM(S): at 21:04

## 2021-10-01 RX ADMIN — CHLORHEXIDINE GLUCONATE 1 APPLICATION(S): 213 SOLUTION TOPICAL at 11:20

## 2021-10-01 NOTE — PROGRESS NOTE ADULT - SUBJECTIVE AND OBJECTIVE BOX
Patient is a 60y Female     Patient is a 60y old  Female who presents with a chief complaint of hepatitis, jaundice (01 Oct 2021 07:13)      HPI:   60F with PMH of chronic lower back pain 2/2 degenerative changes, chronic smoker, h/o COPD, noncompliant with treatment, had a CT C/A/P done in 4/2021 2/2 chronic pulmonary nodules and right adrenal adenoma monitoring.  ptn states for the past 2 weeks her urine is brown, she feels nauseous, has diffuse abdominal and back pain, denies dysuria. states she has fevers but hasnt checked her temps. states she has chills  One episode of NBNB emesis 2 weeks ago. Reports constipation with occasional soft stools. never had a colonoscopy, refused COVID vaccine, [facility]. Denies taking Tylenol.    Denies chest pain, leg swelling or pain. (24 Sep 2021 18:33)      PAST MEDICAL & SURGICAL HISTORY:  Hypothyroidism    COPD not affecting current episode of care    Adrenal adenoma    Low back pain    DJD (degenerative joint disease)    Nicotine addiction        MEDICATIONS  (STANDING):  acetylcysteine IVPB 7 Gram(s) IV Intermittent <User Schedule>  chlorhexidine 2% Cloths 1 Application(s) Topical daily  gabapentin 100 milliGRAM(s) Oral three times a day  influenza   Vaccine 0.5 milliLiter(s) IntraMuscular once  lactulose Syrup 30 Gram(s) Oral every 8 hours  levothyroxine 25 MICROGram(s) Oral daily  methylPREDNISolone sodium succinate Injectable 60 milliGRAM(s) IV Push daily  polyethylene glycol 3350 17 Gram(s) Oral daily  potassium chloride    Tablet ER 20 milliEquivalent(s) Oral daily  senna 2 Tablet(s) Oral at bedtime      Allergies    penicillin (Rash)    Intolerances        SOCIAL HISTORY:  Denies ETOh,Smoking,     FAMILY HISTORY:      REVIEW OF SYSTEMS:    CONSTITUTIONAL: No weakness, fevers or chills  EYES/ENT: No visual changes;  No vertigo or throat pain   NECK: No pain or stiffness  RESPIRATORY: No cough, wheezing, hemoptysis; No shortness of breath  CARDIOVASCULAR: No chest pain or palpitations  GASTROINTESTINAL: No abdominal or epigastric pain. No nausea, vomiting, or hematemesis; No diarrhea or constipation. No melena or hematochezia.  GENITOURINARY: No dysuria, frequency or hematuria  NEUROLOGICAL: No numbness or weakness  SKIN: No itching, burning, rashes, or lesions   All other review of systems is negative unless indicated above.    VITAL:  T(C): , Max: 37.1 (09-30-21 @ 23:30)  T(F): , Max: 98.8 (09-30-21 @ 23:30)  HR: 69 (09-30-21 @ 23:30)  BP: 106/65 (09-30-21 @ 23:30)  BP(mean): --  RR: 18 (09-30-21 @ 23:30)  SpO2: 94% (09-30-21 @ 23:30)  Wt(kg): --    I and O's:    09-29 @ 07:01  -  09-30 @ 07:00  --------------------------------------------------------  IN: 620 mL / OUT: 0 mL / NET: 620 mL    09-30 @ 07:01  -  10-01 @ 07:00  --------------------------------------------------------  IN: 300 mL / OUT: 0 mL / NET: 300 mL          PHYSICAL EXAM:    Constitutional: NAD  HEENT: PERRLA,   Neck: No JVD  Respiratory: CTA B/L  Cardiovascular: S1 and S2  Gastrointestinal: BS+, soft, NT/ND  Extremities: No peripheral edema  Neurological: A/O x 3, no focal deficits  Psychiatric: Normal mood, normal affect  : No Cee  Skin: No rashes  Access: Not applicable  Back: No CVA tenderness    LABS:                        12.7   7.64  )-----------( 259      ( 01 Oct 2021 06:27 )             37.4     09-30    132<L>  |  98  |  7   ----------------------------<  105<H>  3.7   |  22  |  0.58    Ca    8.1<L>      30 Sep 2021 17:53    TPro  6.3  /  Alb  2.9<L>  /  TBili  15.8<H>  /  DBili  x   /  AST  1780<H>  /  ALT  1671<H>  /  AlkPhos  368<H>  09-30          RADIOLOGY & ADDITIONAL STUDIES:

## 2021-10-01 NOTE — PROGRESS NOTE ADULT - SUBJECTIVE AND OBJECTIVE BOX
Patient is a 60y old  Female who presents with a chief complaint of hepatitis, jaundice (01 Oct 2021 14:02)      SUBJECTIVE / OVERNIGHT EVENTS: ongoing hepatitis, c/o feeling lethargic, started on ursodiol and Acetylcysteine, cont medrol IV, given 1/3 doses IV VIT K 10 mg as per GI,     MEDICATIONS  (STANDING):  acetylcysteine IVPB 7 Gram(s) IV Intermittent <User Schedule>  chlorhexidine 2% Cloths 1 Application(s) Topical daily  influenza   Vaccine 0.5 milliLiter(s) IntraMuscular once  levothyroxine 25 MICROGram(s) Oral daily  methylPREDNISolone sodium succinate Injectable 60 milliGRAM(s) IV Push daily  polyethylene glycol 3350 17 Gram(s) Oral daily  potassium chloride    Tablet ER 20 milliEquivalent(s) Oral daily  senna 2 Tablet(s) Oral at bedtime  thiamine Injectable 100 milliGRAM(s) IV Push daily  ursodiol Tablet 500 milliGRAM(s) Oral every 12 hours    MEDICATIONS  (PRN):      Vital Signs Last 24 Hrs  T(F): 98 (10-01-21 @ 15:35), Max: 98.8 (09-30-21 @ 23:30)  HR: 68 (10-01-21 @ 15:35) (66 - 69)  BP: 109/65 (10-01-21 @ 15:35) (106/65 - 123/85)  RR: 20 (10-01-21 @ 15:35) (18 - 20)  SpO2: 95% (10-01-21 @ 15:35) (94% - 96%)  Telemetry:   CAPILLARY BLOOD GLUCOSE        I&O's Summary    30 Sep 2021 07:01  -  01 Oct 2021 07:00  --------------------------------------------------------  IN: 300 mL / OUT: 0 mL / NET: 300 mL        PHYSICAL EXAM:  GENERAL: NAD, well-developed  HEAD:  Atraumatic, Normocephalic  EYES: EOMI, PERRLA, conjunctiva and sclera clear  NECK: Supple, No JVD  CHEST/LUNG: Clear to auscultation bilaterally; No wheeze  HEART: Regular rate and rhythm; No murmurs, rubs, or gallops  ABDOMEN: Soft, Nontender, Nondistended; Bowel sounds present  EXTREMITIES:  2+ Peripheral Pulses, No clubbing, cyanosis, or edema  PSYCH: AAOx3  NEUROLOGY: non-focal  SKIN: No rashes or lesions    LABS:                        12.7   7.64  )-----------( 259      ( 01 Oct 2021 06:27 )             37.4     09-30    132<L>  |  98  |  7   ----------------------------<  105<H>  3.7   |  22  |  0.58    Ca    8.1<L>      30 Sep 2021 17:53    TPro  5.9<L>  /  Alb  2.7<L>  /  TBili  14.6<H>  /  DBili  >10.0<H>  /  AST  1866<H>  /  ALT  1581<H>  /  AlkPhos  306<H>  10-01    PT/INR - ( 01 Oct 2021 16:52 )   PT: 16.4 sec;   INR: 1.39 ratio         PTT - ( 01 Oct 2021 16:52 )  PTT:31.1 sec          RADIOLOGY & ADDITIONAL TESTS:    Imaging Personally Reviewed:    Consultant(s) Notes Reviewed:      Care Discussed with Consultants/Other Providers:

## 2021-10-01 NOTE — CHART NOTE - NSCHARTNOTEFT_GEN_A_CORE
Biopsy reviewed with pathology  Submassive necrosis present, at least 60%  No evidence of chronic liver disease despite what is suggested by imaging  Needs contrasted imaging (MRI prefer)  Stop lactulose and monitor ammonia levels

## 2021-10-01 NOTE — CHART NOTE - NSCHARTNOTEFT_GEN_A_CORE
10/1	per Hepatology--Stat amonia, fibrinogen, VBG, echo ordered and INR, fibrinogen, vbg q8h ordered, cont iv solum, NAC (N acetylecysteine) day #1, monitor the lab.  Addendum-- per hepatic transplant team--ursodiol 500 mg bid, HIV screen, d/c lactulose, MRI abd w contr (spoke to MRI suit--they will do tomorrow)  [facility]) [facility] # [id number]

## 2021-10-01 NOTE — PROGRESS NOTE ADULT - ATTENDING COMMENTS
59 y/o F with history of COPD and hypothyroidism presenting with jaundice, dark urine on 9/24/21 found to have severe mixed hepatocellular and cholestatic liver injury and rising INR consistent with severe acute liver injury that could be developing into acute liver failure.    She is currently AAO x 3 without asterixis but she needs to be monitored closely for encephalopathy especially if her INR continues to uptrend.  INR continues to uptrend, please trend CBC, CMP, PT/INR, ammonia, lactate, fibrinogen q 8 hrs for now.  Planning to review liver biopsy this afternoon.  Her clinical picture to me is consistent with either drug-induced autoimmune hepatitis (perhaps lipitor) vs de cory given her highly positive FARIDEH and positive ASMA.  She was started on prednisone 40 mg daily (9/29- ), steroids increased to solumedrol 60 mg IV daily (9/30-)  Liver enzymes not improving since starting steroids is concerning  I have looked into her finances and she is cleared for liver transplant. TTE looks OK.  Transplant surgery following 61 y/o F with history of COPD and hypothyroidism presenting with jaundice, dark urine on 9/24/21 found to have severe mixed hepatocellular and cholestatic liver injury and rising INR consistent with severe acute liver injury that could be developing into acute liver failure.    She is currently AAO x 3 without asterixis but she needs to be monitored closely for encephalopathy especially if her INR continues to uptrend.  INR continues to uptrend, please trend CBC, CMP, PT/INR, ammonia, lactate, fibrinogen q 8 hrs for now.  Planning to review liver biopsy this afternoon.  Her clinical picture to me is consistent with either drug-induced autoimmune hepatitis (perhaps lipitor) vs de cory given her highly positive FARIDEH and positive ASMA.  She was started on prednisone 40 mg daily (9/29- ), steroids increased to solumedrol 60 mg IV daily (9/30-)  Liver enzymes not improving since starting steroids is concerning  I have looked into her finances and she is cleared for liver transplant. TTE looks OK.  Transplant surgery following  Continue NAC drip (10/1- 61 y/o F with history of COPD and hypothyroidism presenting with jaundice, dark urine on 9/24/21 found to have severe mixed hepatocellular and cholestatic liver injury and rising INR consistent with severe acute liver injury that could be developing into acute liver failure.    She is currently AAO x 3 without asterixis but she needs to be monitored closely for encephalopathy especially if her INR continues to uptrend.  INR continues to uptrend, please trend CBC, CMP, PT/INR, ammonia, lactate, fibrinogen q 8 hrs for now.  Planning to review liver biopsy this afternoon.  Her clinical picture to me is consistent with either drug-induced autoimmune hepatitis (perhaps lipitor) vs de cory given her highly positive FARIDEH and positive ASMA.  She was started on prednisone 40 mg daily (9/29- ), steroids increased to solumedrol 60 mg IV daily (9/30-)  Liver enzymes not improving since starting steroids is concerning  I have looked into her finances and she is cleared for liver transplant. TTE looks OK.  Transplant surgery following  Continue NAC drip (9/30- ) for non-tylenol acute liver failure

## 2021-10-01 NOTE — PROGRESS NOTE ADULT - SUBJECTIVE AND OBJECTIVE BOX
[facility] Neurological Care [facility]      Seen earlier today, and examined.  - Today, patient is without complaints.    reports feeling sluggish        *****MEDICATIONS: Current medication reviewed and documented.    MEDICATIONS  (STANDING):  acetylcysteine IVPB 7 Gram(s) IV Intermittent <User Schedule>  chlorhexidine 2% Cloths 1 Application(s) Topical daily  gabapentin 100 milliGRAM(s) Oral three times a day  influenza   Vaccine 0.5 milliLiter(s) IntraMuscular once  lactulose Syrup 30 Gram(s) Oral every 8 hours  levothyroxine 25 MICROGram(s) Oral daily  methylPREDNISolone sodium succinate Injectable 60 milliGRAM(s) IV Push daily  polyethylene glycol 3350 17 Gram(s) Oral daily  potassium chloride    Tablet ER 20 milliEquivalent(s) Oral daily  senna 2 Tablet(s) Oral at bedtime    MEDICATIONS  (PRN):          ***** VITAL SIGNS:  T(F): 97.5 (10-01-21 @ 10:20), Max: 98.8 (21 @ 23:30)  HR: 66 (10-01-21 @ 10:20) (66 - 73)  BP: 123/85 (10-01-21 @ 10:20) (103/67 - 123/85)  RR: 20 (10-01-21 @ 10:20) (18 - 20)  SpO2: 96% (10-01-21 @ 10:20) (94% - 96%)  Wt(kg): --  ,   I&O's Summary    30 Sep 2021 07:01  -  01 Oct 2021 07:00  --------------------------------------------------------  IN: 300 mL / OUT: 0 mL / NET: 300 mL           *****PHYSICAL EXAM:Alert oriented x3  Attention comprehension are limited  . Able to name, repeat,  without any difficulty.   Able to follow 1 step commands.     EOMI fundi not visualized,   No facial asymmetry   Tongue is midline     Moving all 4 ext symmetrically   no limitation of straight leg raising        Gait : not assessed.  B/L down going toes             *****LAB AND IMAGIN.7   7.64  )-----------( 259      ( 01 Oct 2021 06:27 )             37.4               09-30    132<L>  |  98  |  7   ----------------------------<  105<H>  3.7   |  22  |  0.58    Ca    8.1<L>      30 Sep 2021 17:53    TPro  5.9<L>  /  Alb  2.7<L>  /  TBili  14.6<H>  /  DBili  >10.0<H>  /  AST  1866<H>  /  ALT  1581<H>  /  AlkPhos  306<H>  10-01    PT/INR - ( 01 Oct 2021 06:28 )   PT: 18.0 sec;   INR: 1.53 ratio         PTT - ( 01 Oct 2021 06:28 )  PTT:32.3 sec                     [All pertinent recent Imaging/Reports reviewed]           *****A S S E S S M E N T   A N D   P L A N :      Excerpt from H&P,"    60F with PMH of chronic lower back pain 2/2 degenerative changes, chronic smoker, h/o COPD, noncompliant with treatment, had a CT C/A/P done in 2021 2/2 chronic pulmonary nodules and right adrenal adenoma monitoring.  ptn states for the past 2 weeks her urine is brown, she feels nauseous, has diffuse abdominal and back pain, denies dysuria. states she has fevers but hasnt checked her temps. states she has chills  One episode of NBNB emesis 2 weeks ago. Reports constipation with occasional soft stools. never had a colonoscopy, refused COVID vaccine, has a covid swab today at an urgi center: negative. Denies taking Tylenol.    Denies chest pain, leg swelling or pain.          Problem/Recommendations 1: back pain   chronic   continue gabapentin 100 tid   continue lactulose monitor bms  no complaints of back pain    will lower gabapentin 100 qhs due to complaints of lethargy       Problem/Recommendations 2:  transaminitis   trend   pt reports taking lipitor previously   biopsy results pending   lfts trending down.   rising bili  with reported brain fog will add ursodiol if no objection from hepatology   thiamine iv push can be considered          Thank you for allowing me to participate in the care of this patient. Will continue to follow patient periodically. Please do not hesitate to call me if you have any  questions or if there has been a change in patients neurological status     ________________  Ruth Chung MD  [facility] Neurological [facility] ([facility]  [phone]      33 minutes spent on total encounter; more than 50 % of the visit was  spent counseling about plan of care, compliance to diet/exercise and medication regimen and or  coordinating care by the attending physician.      It is advised that stroke patients follow up with WALTER Weaver @ [phone] in 1- 2 weeks.   Others please follow up with Dr. Michael Nissenbaum [phone]

## 2021-10-01 NOTE — CHART NOTE - NSCHARTNOTEFT_GEN_A_CORE
Please send every 8 hours:  CBC  CMP  INR  Ammonia  Fibrinogen  Lactate      Please get stat HIV, ammonia, lactate.  Discussed with primary team - Carol Ann.      Ping Larry PGY-5  Gastroenterology Fellow  Pager #[phone] ([facility]) or [phone] ([facility])  Available on [employer] Teams.  Please contact on-call GI fellow via answering service ([phone]) after 5pm and before 8am, and on weekends. Please send every 8 hours:  CBC  CMP  INR  Ammonia  Fibrinogen  Lactate      Please get stat CBC, HIV, ammonia, lactate.  Discussed with primary team - Hamilton Larry PGY-5  Gastroenterology Fellow  Pager #[phone] ([facility]) or [phone] ([facility])  Available on [employer] Teams.  Please contact on-call GI fellow via answering service ([phone]) after 5pm and before 8am, and on weekends. Please send every 8 hours:  CBC  CMP  INR  Ammonia  Fibrinogen  Lactate      Please get stat CBC, CMP, HIV, ammonia, lactate.  Discussed with primary team - Carol Ann.      Ping Larry PGY-5  Gastroenterology Fellow  Pager #[phone] ([facility]) or [phone] ([facility])  Available on [employer] Teams.  Please contact on-call GI fellow via answering service ([phone]) after 5pm and before 8am, and on weekends.

## 2021-10-01 NOTE — PROGRESS NOTE ADULT - SUBJECTIVE AND OBJECTIVE BOX
Interval Events:   INR increasing overnight.  Awaiting remainder of labs this morning.  Patient still endorsing jaundice and "brain fog."    ROS:   A 12-point ROS was performed and negative except as noted in HPI.    Hospital Medications:  acetylcysteine IVPB 7 Gram(s) IV Intermittent <User Schedule>  chlorhexidine 2% Cloths 1 Application(s) Topical daily  gabapentin 100 milliGRAM(s) Oral three times a day  influenza   Vaccine 0.5 milliLiter(s) IntraMuscular once  lactulose Syrup 30 Gram(s) Oral every 8 hours  levothyroxine 25 MICROGram(s) Oral daily  methylPREDNISolone sodium succinate Injectable 60 milliGRAM(s) IV Push daily  polyethylene glycol 3350 17 Gram(s) Oral daily  potassium chloride    Tablet ER 20 milliEquivalent(s) Oral daily  senna 2 Tablet(s) Oral at bedtime      PHYSICAL EXAM:   Vital Signs:  Vital Signs Last 24 Hrs  T(C): 37.1 (30 Sep 2021 23:30), Max: 37.1 (30 Sep 2021 23:30)  T(F): 98.8 (30 Sep 2021 23:30), Max: 98.8 (30 Sep 2021 23:30)  HR: 69 (30 Sep 2021 23:30) (69 - 73)  BP: 106/65 (30 Sep 2021 23:30) (103/67 - 123/83)  BP(mean): --  RR: 18 (30 Sep 2021 23:30) (18 - 18)  SpO2: 94% (30 Sep 2021 23:30) (94% - 96%)  Daily     Daily     GENERAL: no acute distress  NEURO: alert, but endorsing "brain fog"  HEENT: icteric sclera, no conjunctival pallor appreciated  CHEST: no respiratory distress, no accessory muscle use  CARDIAC: regular rate, +S1/S2  ABDOMEN: soft, nondistended, nontender, no rebound or guarding  EXTREMITIES: warm, well perfused, trace edema  SKIN: jaundice, scattered ecchymoses present    LABS: reviewed                        12.7   7.64  )-----------( 259      ( 01 Oct 2021 06:27 )             37.4     09-30    132<L>  |  98  |  7   ----------------------------<  105<H>  3.7   |  22  |  0.58    Ca    8.1<L>      30 Sep 2021 17:53    TPro  6.3  /  Alb  2.9<L>  /  TBili  15.8<H>  /  DBili  x   /  AST  1780<H>  /  ALT  1671<H>  /  AlkPhos  368<H>  09-30    LIVER FUNCTIONS - ( 30 Sep 2021 17:53 )  Alb: 2.9 g/dL / Pro: 6.3 g/dL / ALK PHOS: 368 U/L / ALT: 1671 U/L / AST: 1780 U/L / GGT: x             Interval Diagnostic Studies: see sunrise for full report

## 2021-10-02 LAB
ALBUMIN SERPL ELPH-MCNC: 2.6 G/DL — LOW (ref 3.3–5)
ALBUMIN SERPL ELPH-MCNC: 2.7 G/DL — LOW (ref 3.3–5)
ALP SERPL-CCNC: 288 U/L — HIGH (ref 40–120)
ALP SERPL-CCNC: 291 U/L — HIGH (ref 40–120)
ALT FLD-CCNC: 1361 U/L — HIGH (ref 10–45)
ALT FLD-CCNC: 1390 U/L — HIGH (ref 10–45)
ANION GAP SERPL CALC-SCNC: 11 MMOL/L — SIGNIFICANT CHANGE UP (ref 5–17)
ANION GAP SERPL CALC-SCNC: 11 MMOL/L — SIGNIFICANT CHANGE UP (ref 5–17)
AST SERPL-CCNC: 1024 U/L — HIGH (ref 10–40)
AST SERPL-CCNC: 1028 U/L — HIGH (ref 10–40)
BASE EXCESS BLDV CALC-SCNC: 2 MMOL/L — SIGNIFICANT CHANGE UP (ref -2–2)
BASE EXCESS BLDV CALC-SCNC: 2.4 MMOL/L — HIGH (ref -2–2)
BILIRUB DIRECT SERPL-MCNC: 7.7 MG/DL — HIGH (ref 0–0.2)
BILIRUB INDIRECT FLD-MCNC: 3.5 MG/DL — HIGH (ref 0.2–1)
BILIRUB SERPL-MCNC: 11.2 MG/DL — HIGH (ref 0.2–1.2)
BILIRUB SERPL-MCNC: 11.4 MG/DL — HIGH (ref 0.2–1.2)
BUN SERPL-MCNC: 7 MG/DL — SIGNIFICANT CHANGE UP (ref 7–23)
BUN SERPL-MCNC: 7 MG/DL — SIGNIFICANT CHANGE UP (ref 7–23)
CALCIUM SERPL-MCNC: 8.4 MG/DL — SIGNIFICANT CHANGE UP (ref 8.4–10.5)
CALCIUM SERPL-MCNC: 8.5 MG/DL — SIGNIFICANT CHANGE UP (ref 8.4–10.5)
CHLORIDE SERPL-SCNC: 103 MMOL/L — SIGNIFICANT CHANGE UP (ref 96–108)
CHLORIDE SERPL-SCNC: 104 MMOL/L — SIGNIFICANT CHANGE UP (ref 96–108)
CO2 BLDV-SCNC: 27 MMOL/L — HIGH (ref 22–26)
CO2 BLDV-SCNC: 28 MMOL/L — HIGH (ref 22–26)
CO2 SERPL-SCNC: 23 MMOL/L — SIGNIFICANT CHANGE UP (ref 22–31)
CO2 SERPL-SCNC: 23 MMOL/L — SIGNIFICANT CHANGE UP (ref 22–31)
CREAT SERPL-MCNC: 0.43 MG/DL — LOW (ref 0.5–1.3)
CREAT SERPL-MCNC: 0.43 MG/DL — LOW (ref 0.5–1.3)
FIBRINOGEN PPP-MCNC: 175 MG/DL — LOW (ref 290–520)
FIBRINOGEN PPP-MCNC: 177 MG/DL — LOW (ref 290–520)
GAS PNL BLDV: SIGNIFICANT CHANGE UP
GAS PNL BLDV: SIGNIFICANT CHANGE UP
GLUCOSE SERPL-MCNC: 135 MG/DL — HIGH (ref 70–99)
GLUCOSE SERPL-MCNC: 138 MG/DL — HIGH (ref 70–99)
HCO3 BLDV-SCNC: 26 MMOL/L — SIGNIFICANT CHANGE UP (ref 22–29)
HCO3 BLDV-SCNC: 26 MMOL/L — SIGNIFICANT CHANGE UP (ref 22–29)
HCT VFR BLD CALC: 35.4 % — SIGNIFICANT CHANGE UP (ref 34.5–45)
HGB BLD-MCNC: 12.6 G/DL — SIGNIFICANT CHANGE UP (ref 11.5–15.5)
HIV 1+2 AB+HIV1 P24 AG SERPL QL IA: SIGNIFICANT CHANGE UP
INR BLD: 1.33 RATIO — HIGH (ref 0.88–1.16)
LACTATE SERPL-SCNC: 1.7 MMOL/L — SIGNIFICANT CHANGE UP (ref 0.7–2)
MCHC RBC-ENTMCNC: 29.9 PG — SIGNIFICANT CHANGE UP (ref 27–34)
MCHC RBC-ENTMCNC: 35.6 GM/DL — SIGNIFICANT CHANGE UP (ref 32–36)
MCV RBC AUTO: 84.1 FL — SIGNIFICANT CHANGE UP (ref 80–100)
NRBC # BLD: 0 /100 WBCS — SIGNIFICANT CHANGE UP (ref 0–0)
PCO2 BLDV: 36 MMHG — LOW (ref 39–42)
PCO2 BLDV: 38 MMHG — LOW (ref 39–42)
PH BLDV: 7.45 — HIGH (ref 7.32–7.43)
PH BLDV: 7.46 — HIGH (ref 7.32–7.43)
PLATELET # BLD AUTO: 285 K/UL — SIGNIFICANT CHANGE UP (ref 150–400)
PO2 BLDV: 125 MMHG — HIGH (ref 25–45)
PO2 BLDV: 125 MMHG — HIGH (ref 25–45)
POTASSIUM SERPL-MCNC: 3.6 MMOL/L — SIGNIFICANT CHANGE UP (ref 3.5–5.3)
POTASSIUM SERPL-MCNC: 3.6 MMOL/L — SIGNIFICANT CHANGE UP (ref 3.5–5.3)
POTASSIUM SERPL-SCNC: 3.6 MMOL/L — SIGNIFICANT CHANGE UP (ref 3.5–5.3)
POTASSIUM SERPL-SCNC: 3.6 MMOL/L — SIGNIFICANT CHANGE UP (ref 3.5–5.3)
PROT SERPL-MCNC: 5.7 G/DL — LOW (ref 6–8.3)
PROT SERPL-MCNC: 5.9 G/DL — LOW (ref 6–8.3)
PROTHROM AB SERPL-ACNC: 15.8 SEC — HIGH (ref 10.6–13.6)
RBC # BLD: 4.21 M/UL — SIGNIFICANT CHANGE UP (ref 3.8–5.2)
RBC # FLD: 20.3 % — HIGH (ref 10.3–14.5)
SAO2 % BLDV: 100 % — HIGH (ref 67–88)
SAO2 % BLDV: 99.3 % — HIGH (ref 67–88)
SODIUM SERPL-SCNC: 137 MMOL/L — SIGNIFICANT CHANGE UP (ref 135–145)
SODIUM SERPL-SCNC: 138 MMOL/L — SIGNIFICANT CHANGE UP (ref 135–145)
WBC # BLD: 10.16 K/UL — SIGNIFICANT CHANGE UP (ref 3.8–10.5)
WBC # FLD AUTO: 10.16 K/UL — SIGNIFICANT CHANGE UP (ref 3.8–10.5)

## 2021-10-02 PROCEDURE — 74183 MRI ABD W/O CNTR FLWD CNTR: CPT | Mod: 26

## 2021-10-02 PROCEDURE — 99232 SBSQ HOSP IP/OBS MODERATE 35: CPT | Mod: GC

## 2021-10-02 RX ORDER — PHYTONADIONE (VIT K1) 5 MG
10 TABLET ORAL ONCE
Refills: 0 | Status: COMPLETED | OUTPATIENT
Start: 2021-10-02 | End: 2021-10-02

## 2021-10-02 RX ADMIN — SENNA PLUS 2 TABLET(S): 8.6 TABLET ORAL at 21:41

## 2021-10-02 RX ADMIN — CHLORHEXIDINE GLUCONATE 1 APPLICATION(S): 213 SOLUTION TOPICAL at 11:25

## 2021-10-02 RX ADMIN — Medication 100 MILLIGRAM(S): at 13:47

## 2021-10-02 RX ADMIN — Medication 43.13 GRAM(S): at 18:25

## 2021-10-02 RX ADMIN — GABAPENTIN 100 MILLIGRAM(S): 400 CAPSULE ORAL at 21:41

## 2021-10-02 RX ADMIN — URSODIOL 500 MILLIGRAM(S): 250 TABLET, FILM COATED ORAL at 05:32

## 2021-10-02 RX ADMIN — URSODIOL 500 MILLIGRAM(S): 250 TABLET, FILM COATED ORAL at 18:25

## 2021-10-02 RX ADMIN — Medication 20 MILLIEQUIVALENT(S): at 11:28

## 2021-10-02 RX ADMIN — Medication 25 MICROGRAM(S): at 05:32

## 2021-10-02 RX ADMIN — Medication 60 MILLIGRAM(S): at 05:32

## 2021-10-02 RX ADMIN — Medication 0.5 MILLIGRAM(S): at 16:49

## 2021-10-02 RX ADMIN — Medication 102 MILLIGRAM(S): at 10:00

## 2021-10-02 NOTE — PROGRESS NOTE ADULT - SUBJECTIVE AND OBJECTIVE BOX
Patient is a 60y old  Female who presents with a chief complaint of hepatitis, jaundice (02 Oct 2021 11:38)      SUBJECTIVE / OVERNIGHT EVENTS: LFTs improving, NAC stopped, ongoing Medrol 60 mg daily, s/p 2/3 doses IV Vit K, PT/INR improving as well    MEDICATIONS  (STANDING):  acetylcysteine IVPB 7 Gram(s) IV Intermittent <User Schedule>  chlorhexidine 2% Cloths 1 Application(s) Topical daily  gabapentin 100 milliGRAM(s) Oral at bedtime  influenza   Vaccine 0.5 milliLiter(s) IntraMuscular once  levothyroxine 25 MICROGram(s) Oral daily  methylPREDNISolone sodium succinate Injectable 60 milliGRAM(s) IV Push daily  polyethylene glycol 3350 17 Gram(s) Oral daily  potassium chloride    Tablet ER 40 milliEquivalent(s) Oral once  potassium chloride    Tablet ER 20 milliEquivalent(s) Oral daily  senna 2 Tablet(s) Oral at bedtime  sodium chloride 0.9%. 1000 milliLiter(s) (75 mL/Hr) IV Continuous <Continuous>  thiamine Injectable 100 milliGRAM(s) IV Push daily  ursodiol Tablet 500 milliGRAM(s) Oral every 12 hours    MEDICATIONS  (PRN):      Vital Signs Last 24 Hrs  T(F): 98.3 (10-02-21 @ 15:12), Max: 98.3 (10-02-21 @ 15:12)  HR: 61 (10-02-21 @ 15:12) (45 - 77)  BP: 119/66 (10-02-21 @ 15:12) (99/67 - 119/66)  RR: 18 (10-02-21 @ 15:12) (18 - 18)  SpO2: 95% (10-02-21 @ 15:12) (95% - 97%)  Telemetry:   CAPILLARY BLOOD GLUCOSE        I&O's Summary    01 Oct 2021 07:01  -  02 Oct 2021 07:00  --------------------------------------------------------  IN: 320 mL / OUT: 0 mL / NET: 320 mL    02 Oct 2021 07:01  -  02 Oct 2021 20:54  --------------------------------------------------------  IN: 240 mL / OUT: 0 mL / NET: 240 mL        PHYSICAL EXAM:  GENERAL: NAD, well-developed  HEAD:  Atraumatic, Normocephalic  EYES: EOMI, PERRLA, conjunctiva and sclera clear  NECK: Supple, No JVD  CHEST/LUNG: Clear to auscultation bilaterally; No wheeze  HEART: Regular rate and rhythm; No murmurs, rubs, or gallops  ABDOMEN: Soft, Nontender, Nondistended; Bowel sounds present  EXTREMITIES:  2+ Peripheral Pulses, No clubbing, cyanosis, or edema  PSYCH: AAOx3  NEUROLOGY: non-focal  SKIN: No rashes or lesions    LABS:                        12.6   10.16 )-----------( 285      ( 02 Oct 2021 07:27 )             35.4     10-02    137  |  103  |  7   ----------------------------<  135<H>  3.6   |  23  |  0.43<L>    Ca    8.4      02 Oct 2021 07:26    TPro  5.7<L>  /  Alb  2.7<L>  /  TBili  11.2<H>  /  DBili  7.7<H>  /  AST  1024<H>  /  ALT  1361<H>  /  AlkPhos  288<H>  10-02    PT/INR - ( 02 Oct 2021 07:27 )   PT: 15.8 sec;   INR: 1.33 ratio         PTT - ( 01 Oct 2021 16:52 )  PTT:31.1 sec          RADIOLOGY & ADDITIONAL TESTS:    Imaging Personally Reviewed:    Consultant(s) Notes Reviewed:      Care Discussed with Consultants/Other Providers:

## 2021-10-02 NOTE — PROGRESS NOTE ADULT - SUBJECTIVE AND OBJECTIVE BOX
Chief Complaint:  Patient is a 60y old  Female who presents with a chief complaint of hepatitis, jaundice (02 Oct 2021 08:48)      Interval Events: no acute events overnight  - labs improving today      Hospital Medications:  acetylcysteine IVPB 7 Gram(s) IV Intermittent <User Schedule>  chlorhexidine 2% Cloths 1 Application(s) Topical daily  gabapentin 100 milliGRAM(s) Oral at bedtime  influenza   Vaccine 0.5 milliLiter(s) IntraMuscular once  levothyroxine 25 MICROGram(s) Oral daily  methylPREDNISolone sodium succinate Injectable 60 milliGRAM(s) IV Push daily  polyethylene glycol 3350 17 Gram(s) Oral daily  potassium chloride    Tablet ER 40 milliEquivalent(s) Oral once  potassium chloride    Tablet ER 20 milliEquivalent(s) Oral daily  senna 2 Tablet(s) Oral at bedtime  sodium chloride 0.9%. 1000 milliLiter(s) IV Continuous <Continuous>  thiamine Injectable 100 milliGRAM(s) IV Push daily  ursodiol Tablet 500 milliGRAM(s) Oral every 12 hours      PMHX/PSHX:  Hypothyroidism    COPD not affecting current episode of care    Adrenal adenoma    Low back pain    DJD (degenerative joint disease)    Nicotine addiction            ROS:     General:  No weight loss, fevers, chills, night sweats, fatigue   Eyes:  No vision changes  ENT:  No sore throat, pain, runny nose  CV:  No chest pain, palpitations, dizziness   Resp:  No SOB, cough, wheezing  GI:  See HPI  :  No burning with urination, hematuria  Muscle:  No pain, weakness  Neuro:  No weakness/tingling, memory problems  Psych:  No fatigue, insomnia, mood problems, depression  Heme:  No easy bruisability  Skin:  No rash, edema      PHYSICAL EXAM:     GENERAL:  Well developed, no distress  HEENT:  NC/AT,  conjunctivae clear, sclera anicteric  CHEST:  Full & symmetric excursion, no increased effort w/ respirations  HEART:  Regular rhythm & rate  ABDOMEN:  Soft, non-tender, non-distended  EXTREMITIES:  no LE  edema  SKIN:  No rash/erythema/ecchymoses/petechiae/wounds/jaundice  NEURO:  Alert, oriented    Vital Signs:  Vital Signs Last 24 Hrs  T(C): 36.6 (02 Oct 2021 10:03), Max: 37.1 (01 Oct 2021 20:45)  T(F): 97.9 (02 Oct 2021 10:03), Max: 98.8 (01 Oct 2021 20:45)  HR: 77 (02 Oct 2021 10:03) (45 - 77)  BP: 99/67 (02 Oct 2021 10:03) (99/67 - 118/81)  BP(mean): --  RR: 18 (02 Oct 2021 10:03) (18 - 20)  SpO2: 96% (02 Oct 2021 10:03) (95% - 97%)  Daily     Daily     LABS:                        12.6   10.16 )-----------( 285      ( 02 Oct 2021 07:27 )             35.4     10-02    137  |  103  |  7   ----------------------------<  135<H>  3.6   |  23  |  0.43<L>    Ca    8.4      02 Oct 2021 07:26    TPro  5.7<L>  /  Alb  2.7<L>  /  TBili  11.2<H>  /  DBili  7.7<H>  /  AST  1024<H>  /  ALT  1361<H>  /  AlkPhos  288<H>  10-02    LIVER FUNCTIONS - ( 02 Oct 2021 07:26 )  Alb: 2.7 g/dL / Pro: 5.7 g/dL / ALK PHOS: 288 U/L / ALT: 1361 U/L / AST: 1024 U/L / GGT: x           PT/INR - ( 02 Oct 2021 07:27 )   PT: 15.8 sec;   INR: 1.33 ratio         PTT - ( 01 Oct 2021 16:52 )  PTT:31.1 sec    Amylase Serum--      Lipase serum--       [id number]      Imaging:      < from: Transthoracic Echocardiogram (10.01.21 @ 08:14) >  PROCEDURE: Transthoracic echocardiogram with 2-D, M-Mode  and complete spectral and color flow Doppler.  INDICATION: Chest pain, unspecified (R07.9)  ------------------------------------------------------------------------  Dimensions:    Normal Values:  LA:     3.3    2.0 - 4.0 cm  Ao:     2.9    2.0 - 3.8 cm  SEPTUM: 0.9    0.6 - 1.2 cm  PWT:    0.8    0.6 - 1.1 cm  LVIDd:  4.6    3.0 - 5.6 cm  LVIDs:  2.9    1.8 - 4.0 cm  Derived variables:  LVMI: 76 g/m2  RWT: 0.34  Fractional short: 37 %  EF (Mckeon Rule): 69 %Doppler Peak Velocity (m/sec):  AoV=1.6  ------------------------------------------------------------------------  Observations:  Mitral Valve: Normal mitral valve. Minimal mitral  regurgitation.  Aortic Valve/Aorta: Calcified trileaflet aortic valve with  normal opening. Peak transaortic valve gradient equals 10  mm Hg, mean transaortic valve gradient equals 5 mm Hg,  aortic valve velocity time integral equals 34 cm. No aortic  valve regurgitation seen. Peak left ventricular outflow  tract gradient equals 5 mm Hg, LVOT velocity time integral  equals 22 cm.  Aortic Root: 2.9 cm.  Left Atrium: Normal left atrium.  LA volume index = 17  cc/m2. Hypermobile interatrial septum.  Left Ventricle: Normal left ventricular systolic function.  No segmental wall motion abnormalities. Normal left  ventricular internal dimensions and wall thicknesses.  Normal diastolic function.  Right Heart: Normal right atrium. A prominent karla  terminalis is identified in the right atrium (normal  variant). Normal right ventricular size and function.  Normal tricuspid valve. Mild tricuspid regurgitation.  Normal pulmonic valve. Minimal pulmonic regurgitation.  Pericardium/Pleura: Normal pericardium with no pericardial  effusion.  Hemodynamic: Estimated right atrial pressure equals 3 mmHg.  Estimated right ventricular systolic pressure equals 21 mm  Hg, assuming right atrial pressure equals 3 mm Hg,  consistent with normal pulmonary pressures.  ------------------------------------------------------------------------  Conclusions:  1. Normal mitral valve. Minimal mitral regurgitation.  2. Calcified trileaflet aortic valve with normal opening.  No aortic valve regurgitation seen.  3. Normal left ventricular systolic function. No segmental  wall motion abnormalities.  4. Normal right ventricular size and function.  *** No previous Echo exam.    < end of copied text >

## 2021-10-02 NOTE — CHART NOTE - NSCHARTNOTEFT_GEN_A_CORE
Internal Medicine PA Note    F/u on hepatology labs.      Vital Signs Last 24 Hrs  T(C): 36.5 (02 Oct 2021 05:14), Max: 37.1 (01 Oct 2021 20:45)  T(F): 97.7 (02 Oct 2021 05:14), Max: 98.8 (01 Oct 2021 20:45)  HR: 45 (02 Oct 2021 05:14) (45 - 68)  BP: 108/69 (02 Oct 2021 05:14) (108/69 - 123/85)  BP(mean): --  RR: 18 (02 Oct 2021 05:14) (18 - 20)  SpO2: 97% (02 Oct 2021 05:14) (95% - 97%)    Complete Blood Count (10.01.21 @ 19:03)    Nucleated RBC: 0 /100 WBCs    WBC Count: 8.24 K/uL    RBC Count: 4.47 M/uL    Hemoglobin: 13.3 g/dL    Hematocrit: 37.7 %    Mean Cell Volume: 84.3 fl    Mean Cell Hemoglobin: 29.8 pg    Mean Cell Hemoglobin Conc: 35.3 gm/dL    Red Cell Distrib Width: 19.9 %    Platelet Count - Automated: 307 K/uL    Comprehensive Metabolic Panel (10.01.21 @ 19:03)    Sodium, Serum: 136 mmol/L    Potassium, Serum: 3.3 mmol/L    Chloride, Serum: 99 mmol/L    Carbon Dioxide, Serum: 24 mmol/L    Anion Gap, Serum: 13 mmol/L    Blood Urea Nitrogen, Serum: 7 mg/dL    Creatinine, Serum: 0.44 mg/dL    Glucose, Serum: 215 mg/dL    Calcium, Total Serum: 8.6 mg/dL    Protein Total, Serum: 6.4: Severe Icteria, results may be affected g/dL    Albumin, Serum: 2.9 g/dL    Bilirubin Total, Serum: 15.2 mg/dL    Alkaline Phosphatase, Serum: 318 U/L    Aspartate Aminotransferase (AST/SGOT): 1490 U/L    Alanine Aminotransferase (ALT/SGPT): 1714 U/L    eGFR if Non : 109    Activated Partial Thromboplastin Time in AM (10.01.21 @ 16:52)    Activated Partial Thromboplastin Time: 31.1 sec    INR: 1.39: Recommended ranges for therapeutic INR:    2.0-3.0 for most medical and surgical thromboembolic states    2.0-3.0 for atrial fibrillation    2.0-3.0 for bileaflet mechanical valve in aortic position    2.5-3.5 for mechanical heart valves    Chest 2004;126:[id number]  The presence of direct thrombin inhibitors (argatroban, refludan)  may falsely increase results. ratio (10.01.21 @ 16:52)    Lactate, Blood (10.01.21 @ 19:03)    Lactate, Blood: 3.0 mmol/L      Labs reviewed and d/w Dr. Ordonez (GI fellow). Will continue with current management.   Lactate 3.0. Will give IVF 75 cc/hr x 6 hours until next lab draw. Will repeat lactate in 8 hours.  Will continue to monitor closely.  Will endorse to team in AM    Attending to follow in AM       Suzanne NICOLAS  Dept of Medicine Internal Medicine PA Note    F/u on hepatology labs.    Vital Signs Last 24 Hrs  T(C): 36.5 (02 Oct 2021 05:14), Max: 37.1 (01 Oct 2021 20:45)  T(F): 97.7 (02 Oct 2021 05:14), Max: 98.8 (01 Oct 2021 20:45)  HR: 45 (02 Oct 2021 05:14) (45 - 68)  BP: 108/69 (02 Oct 2021 05:14) (108/69 - 123/85)  BP(mean): --  RR: 18 (02 Oct 2021 05:14) (18 - 20)  SpO2: 97% (02 Oct 2021 05:14) (95% - 97%)    Complete Blood Count (10.01.21 @ 19:03)    Nucleated RBC: 0 /100 WBCs    WBC Count: 8.24 K/uL    RBC Count: 4.47 M/uL    Hemoglobin: 13.3 g/dL    Hematocrit: 37.7 %    Mean Cell Volume: 84.3 fl    Mean Cell Hemoglobin: 29.8 pg    Mean Cell Hemoglobin Conc: 35.3 gm/dL    Red Cell Distrib Width: 19.9 %    Platelet Count - Automated: 307 K/uL    Comprehensive Metabolic Panel (10.01.21 @ 19:03)    Sodium, Serum: 136 mmol/L    Potassium, Serum: 3.3 mmol/L    Chloride, Serum: 99 mmol/L    Carbon Dioxide, Serum: 24 mmol/L    Anion Gap, Serum: 13 mmol/L    Blood Urea Nitrogen, Serum: 7 mg/dL    Creatinine, Serum: 0.44 mg/dL    Glucose, Serum: 215 mg/dL    Calcium, Total Serum: 8.6 mg/dL    Protein Total, Serum: 6.4: Severe Icteria, results may be affected g/dL    Albumin, Serum: 2.9 g/dL    Bilirubin Total, Serum: 15.2 mg/dL    Alkaline Phosphatase, Serum: 318 U/L    Aspartate Aminotransferase (AST/SGOT): 1490 U/L    Alanine Aminotransferase (ALT/SGPT): 1714 U/L    eGFR if Non : 109    Activated Partial Thromboplastin Time in AM (10.01.21 @ 16:52)    Activated Partial Thromboplastin Time: 31.1 sec    INR: 1.39: Recommended ranges for therapeutic INR:    2.0-3.0 for most medical and surgical thromboembolic states    2.0-3.0 for atrial fibrillation    2.0-3.0 for bileaflet mechanical valve in aortic position    2.5-3.5 for mechanical heart valves    Chest 2004;126:[id number]  The presence of direct thrombin inhibitors (argatroban, refludan)  may falsely increase results. 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 Ordonez (GI fellow). Will continue with current management as per GI.   - Lactate 3.0. May be 2/2 to liver injury. Will give IVF 75 cc/hr x 6 hours until next lab draw as d/w GI. Will repeat lactate in 8 hours. Pt seen and assessed by me, afebrile, VSS, with no s/s of infection at this time.   - Will continue to monitor CBC/CMP/INR/lactate/fibrinogen q8hr.   - Will continue to monitor closely.  - Will endorse to team in AM    - Attending to follow in AM   - Plan d/w JORDYN.     Suzanne NICOLAS  Dept of Medicine  [id number] Internal Medicine PA Note    F/u on hepatology labs.    Vital Signs Last 24 Hrs  T(C): 36.5 (02 Oct 2021 05:14), Max: 37.1 (01 Oct 2021 20:45)  T(F): 97.7 (02 Oct 2021 05:14), Max: 98.8 (01 Oct 2021 20:45)  HR: 45 (02 Oct 2021 05:14) (45 - 68)  BP: 108/69 (02 Oct 2021 05:14) (108/69 - 123/85)  BP(mean): --  RR: 18 (02 Oct 2021 05:14) (18 - 20)  SpO2: 97% (02 Oct 2021 05:14) (95% - 97%)    Complete Blood Count (10.01.21 @ 19:03)    Nucleated RBC: 0 /100 WBCs    WBC Count: 8.24 K/uL    RBC Count: 4.47 M/uL    Hemoglobin: 13.3 g/dL    Hematocrit: 37.7 %    Mean Cell Volume: 84.3 fl    Mean Cell Hemoglobin: 29.8 pg    Mean Cell Hemoglobin Conc: 35.3 gm/dL    Red Cell Distrib Width: 19.9 %    Platelet Count - Automated: 307 K/uL    Comprehensive Metabolic Panel (10.01.21 @ 19:03)    Sodium, Serum: 136 mmol/L    Potassium, Serum: 3.3 mmol/L    Chloride, Serum: 99 mmol/L    Carbon Dioxide, Serum: 24 mmol/L    Anion Gap, Serum: 13 mmol/L    Blood Urea Nitrogen, Serum: 7 mg/dL    Creatinine, Serum: 0.44 mg/dL    Glucose, Serum: 215 mg/dL    Calcium, Total Serum: 8.6 mg/dL    Protein Total, Serum: 6.4: Severe Icteria, results may be affected g/dL    Albumin, Serum: 2.9 g/dL    Bilirubin Total, Serum: 15.2 mg/dL    Alkaline Phosphatase, Serum: 318 U/L    Aspartate Aminotransferase (AST/SGOT): 1490 U/L    Alanine Aminotransferase (ALT/SGPT): 1714 U/L    eGFR if Non : 109    Activated Partial Thromboplastin Time in AM (10.01.21 @ 16:52)    Activated Partial Thromboplastin Time: 31.1 sec    INR: 1.39: Recommended ranges for therapeutic INR:    2.0-3.0 for most medical and surgical thromboembolic states    2.0-3.0 for atrial fibrillation    2.0-3.0 for bileaflet mechanical valve in aortic position    2.5-3.5 for mechanical heart valves    Chest 2004;126:[id number]  The presence of direct thrombin inhibitors (argatroban, refludan)  may falsely increase results. 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 Larry (GI fellow). Will continue with current management as per GI.   - Lactate 3.0. May be 2/2 to liver injury. Will give IVF 75 cc/hr x 6 hours until next lab draw as d/w GI. Will repeat lactate in 8 hours. Pt seen and assessed by me, afebrile, VSS, with no s/s of infection at this time.   - Will continue to monitor CBC/CMP/INR/lactate/fibrinogen q8hr.   - Will continue to monitor closely.  - Will endorse to team in AM    - Attending to follow in AM   - Plan d/w JORDYN.     Suzanne NICOLAS  Dept of Medicine  [id number]

## 2021-10-02 NOTE — PROGRESS NOTE ADULT - SUBJECTIVE AND OBJECTIVE BOX
Patient is a 60y Female     Patient is a 60y old  Female who presents with a chief complaint of hepatitis, jaundice (01 Oct 2021 18:30)      HPI:   60F with PMH of chronic lower back pain 2/2 degenerative changes, chronic smoker, h/o COPD, noncompliant with treatment, had a CT C/A/P done in 4/2021 2/2 chronic pulmonary nodules and right adrenal adenoma monitoring.  ptn states for the past 2 weeks her urine is brown, she feels nauseous, has diffuse abdominal and back pain, denies dysuria. states she has fevers but hasnt checked her temps. states she has chills  One episode of NBNB emesis 2 weeks ago. Reports constipation with occasional soft stools. never had a colonoscopy, refused COVID vaccine, [facility] negative. Denies taking Tylenol.    Denies chest pain, leg swelling or pain. (24 Sep 2021 18:33)      PAST MEDICAL & SURGICAL HISTORY:  Hypothyroidism    COPD not affecting current episode of care    Adrenal adenoma    Low back pain    DJD (degenerative joint disease)    Nicotine addiction        MEDICATIONS  (STANDING):  acetylcysteine IVPB 7 Gram(s) IV Intermittent <User Schedule>  chlorhexidine 2% Cloths 1 Application(s) Topical daily  gabapentin 100 milliGRAM(s) Oral at bedtime  influenza   Vaccine 0.5 milliLiter(s) IntraMuscular once  levothyroxine 25 MICROGram(s) Oral daily  methylPREDNISolone sodium succinate Injectable 60 milliGRAM(s) IV Push daily  phytonadione  IVPB 10 milliGRAM(s) IV Intermittent once  polyethylene glycol 3350 17 Gram(s) Oral daily  potassium chloride    Tablet ER 20 milliEquivalent(s) Oral daily  potassium chloride    Tablet ER 40 milliEquivalent(s) Oral once  senna 2 Tablet(s) Oral at bedtime  sodium chloride 0.9%. 1000 milliLiter(s) (75 mL/Hr) IV Continuous <Continuous>  thiamine Injectable 100 milliGRAM(s) IV Push daily  ursodiol Tablet 500 milliGRAM(s) Oral every 12 hours      Allergies    penicillin (Rash)    Intolerances        SOCIAL HISTORY:  Denies ETOh,Smoking,     FAMILY HISTORY:      REVIEW OF SYSTEMS:    CONSTITUTIONAL: No weakness, fevers or chills  EYES/ENT: No visual changes;  No vertigo or throat pain   NECK: No pain or stiffness  RESPIRATORY: No cough, wheezing, hemoptysis; No shortness of breath  CARDIOVASCULAR: No chest pain or palpitations  GASTROINTESTINAL: No abdominal or epigastric pain. No nausea, vomiting, or hematemesis; No diarrhea or constipation. No melena or hematochezia.  GENITOURINARY: No dysuria, frequency or hematuria  NEUROLOGICAL: No numbness or weakness  SKIN: No itching, burning, rashes, or lesions   All other review of systems is negative unless indicated above.    VITAL:  T(C): , Max: 37.1 (10-01-21 @ 20:45)  T(F): , Max: 98.8 (10-01-21 @ 20:45)  HR: 45 (10-02-21 @ 05:14)  BP: 108/69 (10-02-21 @ 05:14)  BP(mean): --  RR: 18 (10-02-21 @ 05:14)  SpO2: 97% (10-02-21 @ 05:14)  Wt(kg): --    I and O's:    09-30 @ 07:01  -  10-01 @ 07:00  --------------------------------------------------------  IN: 300 mL / OUT: 0 mL / NET: 300 mL    10-01 @ 07:01  -  10-02 @ 07:00  --------------------------------------------------------  IN: 320 mL / OUT: 0 mL / NET: 320 mL          PHYSICAL EXAM:    Constitutional: NAD  HEENT: PERRLA,   Neck: No JVD  Respiratory: CTA B/L  Cardiovascular: S1 and S2  Gastrointestinal: BS+, soft, NT/ND  Extremities: No peripheral edema  Neurological: A/O x 3, no focal deficits  Psychiatric: Normal mood, normal affect  : No Cee  Skin: No rashes  Access: Not applicable  Back: No CVA tenderness    LABS:                        12.6   10.16 )-----------( 285      ( 02 Oct 2021 07:27 )             35.4     10-02    137  |  103  |  7   ----------------------------<  135<H>  3.6   |  23  |  0.43<L>    Ca    8.4      02 Oct 2021 07:26    TPro  5.7<L>  /  Alb  2.7<L>  /  TBili  11.2<H>  /  DBili  7.7<H>  /  AST  1028<H>  /  ALT  1390<H>  /  AlkPhos  288<H>  10-02          RADIOLOGY & ADDITIONAL STUDIES:

## 2021-10-02 NOTE — CHART NOTE - NSCHARTNOTESELECT_GEN_ALL_CORE
Event Note
Hepatology chart note/Event Note
Event Note
Hepatology Chart Update/Event Note

## 2021-10-02 NOTE — PROGRESS NOTE ADULT - ATTENDING COMMENTS
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 ~60% submassive necrosis seen on biopsy.     Liver tests improving.  Labs daily.  She was started on prednisone 40 mg daily (9/29- ), steroids increased to solumedrol 60 mg IV daily (9/30-)  Off NAC drip now

## 2021-10-03 LAB
ALBUMIN SERPL ELPH-MCNC: 2.7 G/DL — LOW (ref 3.3–5)
ALP SERPL-CCNC: 277 U/L — HIGH (ref 40–120)
ALT FLD-CCNC: 1045 U/L — HIGH (ref 10–45)
AMMONIA BLD-MCNC: 65 UMOL/L — HIGH (ref 11–55)
ANION GAP SERPL CALC-SCNC: 12 MMOL/L — SIGNIFICANT CHANGE UP (ref 5–17)
APTT BLD: 28.2 SEC — SIGNIFICANT CHANGE UP (ref 27.5–35.5)
AST SERPL-CCNC: 574 U/L — HIGH (ref 10–40)
BILIRUB SERPL-MCNC: 7.3 MG/DL — HIGH (ref 0.2–1.2)
BUN SERPL-MCNC: 8 MG/DL — SIGNIFICANT CHANGE UP (ref 7–23)
CALCIUM SERPL-MCNC: 8.6 MG/DL — SIGNIFICANT CHANGE UP (ref 8.4–10.5)
CHLORIDE SERPL-SCNC: 102 MMOL/L — SIGNIFICANT CHANGE UP (ref 96–108)
CO2 SERPL-SCNC: 22 MMOL/L — SIGNIFICANT CHANGE UP (ref 22–31)
CREAT SERPL-MCNC: 0.49 MG/DL — LOW (ref 0.5–1.3)
FIBRINOGEN PPP-MCNC: 171 MG/DL — LOW (ref 290–520)
GLUCOSE SERPL-MCNC: 88 MG/DL — SIGNIFICANT CHANGE UP (ref 70–99)
HCT VFR BLD CALC: 35.5 % — SIGNIFICANT CHANGE UP (ref 34.5–45)
HGB BLD-MCNC: 12.4 G/DL — SIGNIFICANT CHANGE UP (ref 11.5–15.5)
INR BLD: 1.12 RATIO — SIGNIFICANT CHANGE UP (ref 0.88–1.16)
LACTATE SERPL-SCNC: 1.4 MMOL/L — SIGNIFICANT CHANGE UP (ref 0.7–2)
MCHC RBC-ENTMCNC: 29.7 PG — SIGNIFICANT CHANGE UP (ref 27–34)
MCHC RBC-ENTMCNC: 34.9 GM/DL — SIGNIFICANT CHANGE UP (ref 32–36)
MCV RBC AUTO: 85.1 FL — SIGNIFICANT CHANGE UP (ref 80–100)
NRBC # BLD: 0 /100 WBCS — SIGNIFICANT CHANGE UP (ref 0–0)
PLATELET # BLD AUTO: 313 K/UL — SIGNIFICANT CHANGE UP (ref 150–400)
POTASSIUM SERPL-MCNC: 3.6 MMOL/L — SIGNIFICANT CHANGE UP (ref 3.5–5.3)
POTASSIUM SERPL-SCNC: 3.6 MMOL/L — SIGNIFICANT CHANGE UP (ref 3.5–5.3)
PROT SERPL-MCNC: 5.8 G/DL — LOW (ref 6–8.3)
PROTHROM AB SERPL-ACNC: 13.4 SEC — SIGNIFICANT CHANGE UP (ref 10.6–13.6)
RBC # BLD: 4.17 M/UL — SIGNIFICANT CHANGE UP (ref 3.8–5.2)
RBC # FLD: 21.2 % — HIGH (ref 10.3–14.5)
SODIUM SERPL-SCNC: 136 MMOL/L — SIGNIFICANT CHANGE UP (ref 135–145)
WBC # BLD: 12.72 K/UL — HIGH (ref 3.8–10.5)
WBC # FLD AUTO: 12.72 K/UL — HIGH (ref 3.8–10.5)

## 2021-10-03 PROCEDURE — 99232 SBSQ HOSP IP/OBS MODERATE 35: CPT | Mod: GC

## 2021-10-03 RX ORDER — LACTULOSE 10 G/15ML
15 SOLUTION ORAL THREE TIMES A DAY
Refills: 0 | Status: DISCONTINUED | OUTPATIENT
Start: 2021-10-03 | End: 2021-10-05

## 2021-10-03 RX ADMIN — Medication 60 MILLIGRAM(S): at 05:59

## 2021-10-03 RX ADMIN — GABAPENTIN 100 MILLIGRAM(S): 400 CAPSULE ORAL at 21:30

## 2021-10-03 RX ADMIN — URSODIOL 500 MILLIGRAM(S): 250 TABLET, FILM COATED ORAL at 05:59

## 2021-10-03 RX ADMIN — Medication 20 MILLIEQUIVALENT(S): at 11:57

## 2021-10-03 RX ADMIN — Medication 100 MILLIGRAM(S): at 11:57

## 2021-10-03 RX ADMIN — LACTULOSE 15 GRAM(S): 10 SOLUTION ORAL at 14:08

## 2021-10-03 RX ADMIN — SENNA PLUS 2 TABLET(S): 8.6 TABLET ORAL at 21:30

## 2021-10-03 RX ADMIN — Medication 25 MICROGRAM(S): at 05:59

## 2021-10-03 RX ADMIN — URSODIOL 500 MILLIGRAM(S): 250 TABLET, FILM COATED ORAL at 17:30

## 2021-10-03 RX ADMIN — CHLORHEXIDINE GLUCONATE 1 APPLICATION(S): 213 SOLUTION TOPICAL at 12:06

## 2021-10-03 RX ADMIN — LACTULOSE 15 GRAM(S): 10 SOLUTION ORAL at 21:30

## 2021-10-03 NOTE — PROGRESS NOTE ADULT - SUBJECTIVE AND OBJECTIVE BOX
Patient is a 60y Female     Patient is a 60y old  Female who presents with a chief complaint of hepatitis, jaundice (02 Oct 2021 20:54)      HPI:   60F with PMH of chronic lower back pain 2/2 degenerative changes, chronic smoker, h/o COPD, noncompliant with treatment, had a CT C/A/P done in 4/2021 2/2 chronic pulmonary nodules and right adrenal adenoma monitoring.  ptn states for the past 2 weeks her urine is brown, she feels nauseous, has diffuse abdominal and back pain, denies dysuria. states she has fevers but hasnt checked her temps. states she has chills  One episode of NBNB emesis 2 weeks ago. Reports constipation with occasional soft stools. never had a colonoscopy, refused COVID vaccine, [facility]. Denies taking Tylenol.    Denies chest pain, leg swelling or pain. (24 Sep 2021 18:33)      PAST MEDICAL & SURGICAL HISTORY:  Hypothyroidism    COPD not affecting current episode of care    Adrenal adenoma    Low back pain    DJD (degenerative joint disease)    Nicotine addiction        MEDICATIONS  (STANDING):  chlorhexidine 2% Cloths 1 Application(s) Topical daily  gabapentin 100 milliGRAM(s) Oral at bedtime  influenza   Vaccine 0.5 milliLiter(s) IntraMuscular once  levothyroxine 25 MICROGram(s) Oral daily  methylPREDNISolone sodium succinate Injectable 60 milliGRAM(s) IV Push daily  polyethylene glycol 3350 17 Gram(s) Oral daily  potassium chloride    Tablet ER 40 milliEquivalent(s) Oral once  potassium chloride    Tablet ER 20 milliEquivalent(s) Oral daily  senna 2 Tablet(s) Oral at bedtime  sodium chloride 0.9%. 1000 milliLiter(s) (75 mL/Hr) IV Continuous <Continuous>  thiamine Injectable 100 milliGRAM(s) IV Push daily  ursodiol Tablet 500 milliGRAM(s) Oral every 12 hours      Allergies    penicillin (Rash)    Intolerances        SOCIAL HISTORY:  Denies ETOh,Smoking,     FAMILY HISTORY:      REVIEW OF SYSTEMS:    CONSTITUTIONAL: No weakness, fevers or chills  EYES/ENT: No visual changes;  No vertigo or throat pain   NECK: No pain or stiffness  RESPIRATORY: No cough, wheezing, hemoptysis; No shortness of breath  CARDIOVASCULAR: No chest pain or palpitations  GASTROINTESTINAL: No abdominal or epigastric pain. No nausea, vomiting, or hematemesis; No diarrhea or constipation. No melena or hematochezia.  GENITOURINARY: No dysuria, frequency or hematuria  NEUROLOGICAL: No numbness or weakness  SKIN: No itching, burning, rashes, or lesions   All other review of systems is negative unless indicated above.    VITAL:  T(C): , Max: 36.8 (10-02-21 @ 15:12)  T(F): , Max: 98.3 (10-02-21 @ 15:12)  HR: 59 (10-02-21 @ 23:55)  BP: 107/72 (10-02-21 @ 23:55)  BP(mean): --  RR: 18 (10-02-21 @ 23:55)  SpO2: 96% (10-02-21 @ 23:55)  Wt(kg): --    I and O's:    10-01 @ 07:01  -  10-02 @ 07:00  --------------------------------------------------------  IN: 320 mL / OUT: 0 mL / NET: 320 mL    10-02 @ 07:01  -  10-03 @ 07:00  --------------------------------------------------------  IN: 590 mL / OUT: 0 mL / NET: 590 mL          PHYSICAL EXAM:    Constitutional: NAD  HEENT: PERRLA,   Neck: No JVD  Respiratory: CTA B/L  Cardiovascular: S1 and S2  Gastrointestinal: BS+, soft, NT/ND  Extremities: No peripheral edema  Neurological: A/O x 3, no focal deficits  Psychiatric: Normal mood, normal affect  : No Cee  Skin: No rashes  Access: Not applicable  Back: No CVA tenderness    LABS:                        12.4   12.72 )-----------( 313      ( 03 Oct 2021 07:16 )             35.5     10-03    136  |  102  |  8   ----------------------------<  88  3.6   |  22  |  0.49<L>    Ca    8.6      03 Oct 2021 07:18    TPro  5.8<L>  /  Alb  2.7<L>  /  TBili  7.3<H>  /  DBili  x   /  AST  574<H>  /  ALT  x   /  AlkPhos  277<H>  10-03          RADIOLOGY & ADDITIONAL STUDIES:

## 2021-10-03 NOTE — PROGRESS NOTE ADULT - ATTENDING COMMENTS
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 ~60% submassive necrosis seen on biopsy.     Liver tests continue to improve.  Labs daily now  She was started on prednisone 40 mg daily (9/29- ), steroids increased to solumedrol 60 mg IV daily (9/30-10/3)  Decrease steroids to prednisone 40 mg daily starting tmrw  Discharge planning  Check TPMT enzyme level

## 2021-10-03 NOTE — PROGRESS NOTE ADULT - SUBJECTIVE AND OBJECTIVE BOX
Patient is a 60y old  Female who presents with a chief complaint of hepatitis, jaundice (03 Oct 2021 13:47)      SUBJECTIVE / OVERNIGHT EVENTS: symptoms are improving, starting to eat, LFTs improving, AMMONIA rising, will give lactulose today and rpt in am    MEDICATIONS  (STANDING):  chlorhexidine 2% Cloths 1 Application(s) Topical daily  gabapentin 100 milliGRAM(s) Oral at bedtime  influenza   Vaccine 0.5 milliLiter(s) IntraMuscular once  lactulose Syrup 15 Gram(s) Oral three times a day  levothyroxine 25 MICROGram(s) Oral daily  polyethylene glycol 3350 17 Gram(s) Oral daily  potassium chloride    Tablet ER 40 milliEquivalent(s) Oral once  potassium chloride    Tablet ER 20 milliEquivalent(s) Oral daily  senna 2 Tablet(s) Oral at bedtime  sodium chloride 0.9%. 1000 milliLiter(s) (75 mL/Hr) IV Continuous <Continuous>  thiamine Injectable 100 milliGRAM(s) IV Push daily  ursodiol Tablet 500 milliGRAM(s) Oral every 12 hours    MEDICATIONS  (PRN):      Vital Signs Last 24 Hrs  T(F): 98.1 (10-03-21 @ 09:17), Max: 98.3 (10-02-21 @ 15:12)  HR: 69 (10-03-21 @ 09:17) (59 - 69)  BP: 96/61 (10-03-21 @ 09:17) (96/61 - 119/66)  RR: 18 (10-03-21 @ 09:17) (18 - 18)  SpO2: 92% (10-03-21 @ 09:17) (92% - 96%)  Telemetry:   CAPILLARY BLOOD GLUCOSE        I&O's Summary    02 Oct 2021 07:01  -  03 Oct 2021 07:00  --------------------------------------------------------  IN: 590 mL / OUT: 0 mL / NET: 590 mL        PHYSICAL EXAM:  GENERAL: NAD, well-developed  HEAD:  Atraumatic, Normocephalic  EYES: EOMI, PERRLA, conjunctiva and sclera clear  NECK: Supple, No JVD  CHEST/LUNG: Clear to auscultation bilaterally; No wheeze  HEART: Regular rate and rhythm; No murmurs, rubs, or gallops  ABDOMEN: Soft, Nontender, Nondistended; Bowel sounds present  EXTREMITIES:  2+ Peripheral Pulses, No clubbing, cyanosis, or edema  PSYCH: AAOx3  NEUROLOGY: non-focal  SKIN: No rashes or lesions    LABS:                        12.4   12.72 )-----------( 313      ( 03 Oct 2021 07:16 )             35.5     10-03    136  |  102  |  8   ----------------------------<  88  3.6   |  22  |  0.49<L>    Ca    8.6      03 Oct 2021 07:18    TPro  5.8<L>  /  Alb  2.7<L>  /  TBili  7.3<H>  /  DBili  x   /  AST  574<H>  /  ALT  1045<H>  /  AlkPhos  277<H>  10-03    PT/INR - ( 03 Oct 2021 07:20 )   PT: 13.4 sec;   INR: 1.12 ratio         PTT - ( 03 Oct 2021 07:20 )  PTT:28.2 sec          RADIOLOGY & ADDITIONAL TESTS:    Imaging Personally Reviewed:    Consultant(s) Notes Reviewed:      Care Discussed with Consultants/Other Providers:

## 2021-10-03 NOTE — PROGRESS NOTE ADULT - SUBJECTIVE AND OBJECTIVE BOX
Chief Complaint:  Patient is a 60y old  Female who presents with a chief complaint of hepatitis, jaundice (03 Oct 2021 08:07)      Interval Events: no acute events overnight  - no complaints today  - labs continue to improve      Hospital Medications:  chlorhexidine 2% Cloths 1 Application(s) Topical daily  gabapentin 100 milliGRAM(s) Oral at bedtime  influenza   Vaccine 0.5 milliLiter(s) IntraMuscular once  lactulose Syrup 15 Gram(s) Oral three times a day  levothyroxine 25 MICROGram(s) Oral daily  methylPREDNISolone sodium succinate Injectable 60 milliGRAM(s) IV Push daily  polyethylene glycol 3350 17 Gram(s) Oral daily  potassium chloride    Tablet ER 40 milliEquivalent(s) Oral once  potassium chloride    Tablet ER 20 milliEquivalent(s) Oral daily  senna 2 Tablet(s) Oral at bedtime  sodium chloride 0.9%. 1000 milliLiter(s) IV Continuous <Continuous>  thiamine Injectable 100 milliGRAM(s) IV Push daily  ursodiol Tablet 500 milliGRAM(s) Oral every 12 hours      PMHX/PSHX:  Hypothyroidism    COPD not affecting current episode of care    Adrenal adenoma    Low back pain    DJD (degenerative joint disease)    Nicotine addiction            ROS:     General:  No weight loss, fevers, chills, night sweats, fatigue   Eyes:  No vision changes  ENT:  No sore throat, pain, runny nose  CV:  No chest pain, palpitations, dizziness   Resp:  No SOB, cough, wheezing  GI:  See HPI  :  No burning with urination, hematuria  Muscle:  No pain, weakness  Neuro:  No weakness/tingling, memory problems  Psych:  No fatigue, insomnia, mood problems, depression  Heme:  No easy bruisability  Skin:  No rash, edema      PHYSICAL EXAM:     GENERAL:  Well developed, no distress  HEENT:  NC/AT,  conjunctivae clear, sclera anicteric  CHEST:  Full & symmetric excursion, no increased effort w/ respirations  HEART:  Regular rhythm & rate  ABDOMEN:  Soft, non-tender, non-distended  EXTREMITIES:  no LE  edema  SKIN:  No rash/erythema/ecchymoses/petechiae/wounds/jaundice  NEURO:  Alert, oriented    Vital Signs:  Vital Signs Last 24 Hrs  T(C): 36.7 (03 Oct 2021 09:17), Max: 36.8 (02 Oct 2021 15:12)  T(F): 98.1 (03 Oct 2021 09:17), Max: 98.3 (02 Oct 2021 15:12)  HR: 69 (03 Oct 2021 09:17) (59 - 69)  BP: 96/61 (03 Oct 2021 09:17) (96/61 - 119/66)  BP(mean): --  RR: 18 (03 Oct 2021 09:17) (18 - 18)  SpO2: 92% (03 Oct 2021 09:17) (92% - 96%)  Daily     Daily     LABS:                        12.4   12.72 )-----------( 313      ( 03 Oct 2021 07:16 )             35.5     10-03    136  |  102  |  8   ----------------------------<  88  3.6   |  22  |  0.49<L>    Ca    8.6      03 Oct 2021 07:18    TPro  5.8<L>  /  Alb  2.7<L>  /  TBili  7.3<H>  /  DBili  x   /  AST  574<H>  /  ALT  1045<H>  /  AlkPhos  277<H>  10-03    LIVER FUNCTIONS - ( 03 Oct 2021 07:18 )  Alb: 2.7 g/dL / Pro: 5.8 g/dL / ALK PHOS: 277 U/L / ALT: 1045 U/L / AST: 574 U/L / GGT: x           PT/INR - ( 03 Oct 2021 07:20 )   PT: 13.4 sec;   INR: 1.12 ratio         PTT - ( 03 Oct 2021 07:20 )  PTT:28.2 sec    Amylase Serum--      Lipase serum--       [id number]      Imaging:

## 2021-10-04 LAB
ALBUMIN SERPL ELPH-MCNC: 2.8 G/DL — LOW (ref 3.3–5)
ALP SERPL-CCNC: 254 U/L — HIGH (ref 40–120)
ALT FLD-CCNC: 851 U/L — HIGH (ref 10–45)
AMMONIA BLD-MCNC: 63 UMOL/L — HIGH (ref 11–55)
ANION GAP SERPL CALC-SCNC: 12 MMOL/L — SIGNIFICANT CHANGE UP (ref 5–17)
APTT BLD: 28.1 SEC — SIGNIFICANT CHANGE UP (ref 27.5–35.5)
AST SERPL-CCNC: 367 U/L — HIGH (ref 10–40)
BILIRUB SERPL-MCNC: 5.5 MG/DL — HIGH (ref 0.2–1.2)
BUN SERPL-MCNC: 10 MG/DL — SIGNIFICANT CHANGE UP (ref 7–23)
CALCIUM SERPL-MCNC: 8.3 MG/DL — LOW (ref 8.4–10.5)
CHLORIDE SERPL-SCNC: 105 MMOL/L — SIGNIFICANT CHANGE UP (ref 96–108)
CO2 SERPL-SCNC: 21 MMOL/L — LOW (ref 22–31)
CREAT SERPL-MCNC: 0.5 MG/DL — SIGNIFICANT CHANGE UP (ref 0.5–1.3)
FIBRINOGEN PPP-MCNC: 167 MG/DL — LOW (ref 290–520)
GLUCOSE SERPL-MCNC: 112 MG/DL — HIGH (ref 70–99)
HBV DNA # SERPL NAA+PROBE: SIGNIFICANT CHANGE UP IU/ML
HBV DNA SERPL NAA+PROBE-LOG#: SIGNIFICANT CHANGE UP LOG10IU/ML
HCT VFR BLD CALC: 36.5 % — SIGNIFICANT CHANGE UP (ref 34.5–45)
HGB BLD-MCNC: 12.3 G/DL — SIGNIFICANT CHANGE UP (ref 11.5–15.5)
INR BLD: 1.08 RATIO — SIGNIFICANT CHANGE UP (ref 0.88–1.16)
LACTATE SERPL-SCNC: 1.2 MMOL/L — SIGNIFICANT CHANGE UP (ref 0.7–2)
LKM AB SER-ACNC: <20.1 UNITS — SIGNIFICANT CHANGE UP (ref 0–20)
MCHC RBC-ENTMCNC: 29.3 PG — SIGNIFICANT CHANGE UP (ref 27–34)
MCHC RBC-ENTMCNC: 33.7 GM/DL — SIGNIFICANT CHANGE UP (ref 32–36)
MCV RBC AUTO: 86.9 FL — SIGNIFICANT CHANGE UP (ref 80–100)
NRBC # BLD: 0 /100 WBCS — SIGNIFICANT CHANGE UP (ref 0–0)
PLATELET # BLD AUTO: 354 K/UL — SIGNIFICANT CHANGE UP (ref 150–400)
POTASSIUM SERPL-MCNC: 4 MMOL/L — SIGNIFICANT CHANGE UP (ref 3.5–5.3)
POTASSIUM SERPL-SCNC: 4 MMOL/L — SIGNIFICANT CHANGE UP (ref 3.5–5.3)
PROT SERPL-MCNC: 5.8 G/DL — LOW (ref 6–8.3)
PROTHROM AB SERPL-ACNC: 12.9 SEC — SIGNIFICANT CHANGE UP (ref 10.6–13.6)
RBC # BLD: 4.2 M/UL — SIGNIFICANT CHANGE UP (ref 3.8–5.2)
RBC # FLD: 21.2 % — HIGH (ref 10.3–14.5)
SODIUM SERPL-SCNC: 138 MMOL/L — SIGNIFICANT CHANGE UP (ref 135–145)
WBC # BLD: 12.73 K/UL — HIGH (ref 3.8–10.5)
WBC # FLD AUTO: 12.73 K/UL — HIGH (ref 3.8–10.5)

## 2021-10-04 PROCEDURE — 99232 SBSQ HOSP IP/OBS MODERATE 35: CPT

## 2021-10-04 RX ADMIN — URSODIOL 500 MILLIGRAM(S): 250 TABLET, FILM COATED ORAL at 05:30

## 2021-10-04 RX ADMIN — GABAPENTIN 100 MILLIGRAM(S): 400 CAPSULE ORAL at 21:18

## 2021-10-04 RX ADMIN — SENNA PLUS 2 TABLET(S): 8.6 TABLET ORAL at 21:18

## 2021-10-04 RX ADMIN — Medication 25 MICROGRAM(S): at 05:30

## 2021-10-04 RX ADMIN — URSODIOL 500 MILLIGRAM(S): 250 TABLET, FILM COATED ORAL at 17:33

## 2021-10-04 RX ADMIN — Medication 20 MILLIEQUIVALENT(S): at 12:45

## 2021-10-04 RX ADMIN — Medication 60 MILLIGRAM(S): at 05:30

## 2021-10-04 RX ADMIN — CHLORHEXIDINE GLUCONATE 1 APPLICATION(S): 213 SOLUTION TOPICAL at 12:49

## 2021-10-04 RX ADMIN — Medication 100 MILLIGRAM(S): at 12:45

## 2021-10-04 NOTE — PROGRESS NOTE ADULT - SUBJECTIVE AND OBJECTIVE BOX
Patient is a 60y Female     Patient is a 60y old  Female who presents with a chief complaint of hepatitis, jaundice (03 Oct 2021 14:43)      HPI:   60F with PMH of chronic lower back pain 2/2 degenerative changes, chronic smoker, h/o COPD, noncompliant with treatment, had a CT C/A/P done in 4/2021 2/2 chronic pulmonary nodules and right adrenal adenoma monitoring.  ptn states for the past 2 weeks her urine is brown, she feels nauseous, has diffuse abdominal and back pain, denies dysuria. states she has fevers but hasnt checked her temps. states she has chills  One episode of NBNB emesis 2 weeks ago. Reports constipation with occasional soft stools. never had a colonoscopy, refused COVID vaccine, [facility]. Denies taking Tylenol.    Denies chest pain, leg swelling or pain. (24 Sep 2021 18:33)      PAST MEDICAL & SURGICAL HISTORY:  Hypothyroidism    COPD not affecting current episode of care    Adrenal adenoma    Low back pain    DJD (degenerative joint disease)    Nicotine addiction        MEDICATIONS  (STANDING):  chlorhexidine 2% Cloths 1 Application(s) Topical daily  gabapentin 100 milliGRAM(s) Oral at bedtime  influenza   Vaccine 0.5 milliLiter(s) IntraMuscular once  lactulose Syrup 15 Gram(s) Oral three times a day  levothyroxine 25 MICROGram(s) Oral daily  polyethylene glycol 3350 17 Gram(s) Oral daily  potassium chloride    Tablet ER 40 milliEquivalent(s) Oral once  potassium chloride    Tablet ER 20 milliEquivalent(s) Oral daily  predniSONE   Tablet 60 milliGRAM(s) Oral daily  senna 2 Tablet(s) Oral at bedtime  sodium chloride 0.9%. 1000 milliLiter(s) (75 mL/Hr) IV Continuous <Continuous>  thiamine Injectable 100 milliGRAM(s) IV Push daily  ursodiol Tablet 500 milliGRAM(s) Oral every 12 hours      Allergies    penicillin (Rash)    Intolerances        SOCIAL HISTORY:  Denies ETOh,Smoking,     FAMILY HISTORY:      REVIEW OF SYSTEMS:    CONSTITUTIONAL: No weakness, fevers or chills  EYES/ENT: No visual changes;  No vertigo or throat pain   NECK: No pain or stiffness  RESPIRATORY: No cough, wheezing, hemoptysis; No shortness of breath  CARDIOVASCULAR: No chest pain or palpitations  GASTROINTESTINAL: No abdominal or epigastric pain. No nausea, vomiting, or hematemesis; No diarrhea or constipation. No melena or hematochezia.  GENITOURINARY: No dysuria, frequency or hematuria  NEUROLOGICAL: No numbness or weakness  SKIN: No itching, burning, rashes, or lesions   All other review of systems is negative unless indicated above.    VITAL:  T(C): , Max: 36.8 (10-03-21 @ 15:55)  T(F): , Max: 98.2 (10-03-21 @ 15:55)  HR: 73 (10-04-21 @ 08:55)  BP: 101/64 (10-04-21 @ 08:55)  BP(mean): --  RR: 18 (10-04-21 @ 08:55)  SpO2: 97% (10-04-21 @ 08:55)  Wt(kg): --    I and O's:    10-02 @ 07:01  -  10-03 @ 07:00  --------------------------------------------------------  IN: 590 mL / OUT: 0 mL / NET: 590 mL    10-03 @ 07:01  -  10-04 @ 07:00  --------------------------------------------------------  IN: 980 mL / OUT: 0 mL / NET: 980 mL          PHYSICAL EXAM:    Constitutional: NAD  HEENT: PERRLA,   Neck: No JVD  Respiratory: CTA B/L  Cardiovascular: S1 and S2  Gastrointestinal: BS+, soft, NT/ND  Extremities: No peripheral edema  Neurological: A/O x 3, no focal deficits  Psychiatric: Normal mood, normal affect  : No Cee  Skin: No rashes  Access: Not applicable  Back: No CVA tenderness    LABS:                        12.3   12.73 )-----------( 354      ( 04 Oct 2021 06:40 )             36.5     10-04    138  |  105  |  10  ----------------------------<  112<H>  4.0   |  21<L>  |  0.50    Ca    8.3<L>      04 Oct 2021 06:38    TPro  5.8<L>  /  Alb  2.8<L>  /  TBili  5.5<H>  /  DBili  x   /  AST  367<H>  /  ALT  851<H>  /  AlkPhos  254<H>  10-04          RADIOLOGY & ADDITIONAL STUDIES:

## 2021-10-04 NOTE — PHYSICAL THERAPY INITIAL EVALUATION ADULT - PERTINENT HX OF CURRENT PROBLEM, REHAB EVAL
60 y.o. F PMH chronic lower back pain 2/2 degenerative changes, chronic smoker, h/o COPD, noncompliant w/ treatment, had a CT C/A/P done in 4/2021 2/2 chronic pulmonary nodules & right adrenal adenoma monitoring. States for past 2 weeks her urine is brown, feels nauseous, has diffuse abdominal & back pain.

## 2021-10-04 NOTE — PROGRESS NOTE ADULT - SUBJECTIVE AND OBJECTIVE BOX
[facility]      Seen earlier today, and examined.  - Today, patient is without complaints.           *****MEDICATIONS: Current medication reviewed and documented.    MEDICATIONS  (STANDING):  chlorhexidine 2% Cloths 1 Application(s) Topical daily  gabapentin 100 milliGRAM(s) Oral at bedtime  influenza   Vaccine 0.5 milliLiter(s) IntraMuscular once  lactulose Syrup 15 Gram(s) Oral three times a day  levothyroxine 25 MICROGram(s) Oral daily  polyethylene glycol 3350 17 Gram(s) Oral daily  potassium chloride    Tablet ER 40 milliEquivalent(s) Oral once  potassium chloride    Tablet ER 20 milliEquivalent(s) Oral daily  predniSONE   Tablet 60 milliGRAM(s) Oral daily  senna 2 Tablet(s) Oral at bedtime  sodium chloride 0.9%. 1000 milliLiter(s) (75 mL/Hr) IV Continuous <Continuous>  thiamine Injectable 100 milliGRAM(s) IV Push daily  ursodiol Tablet 500 milliGRAM(s) Oral every 12 hours    MEDICATIONS  (PRN):          ***** VITAL SIGNS:  T(F): 98.6 (10-04-21 @ 15:55), Max: 98.6 (10-04-21 @ 15:55)  HR: 57 (10-04-21 @ 15:55) (57 - 77)  BP: 109/70 (10-04-21 @ 15:55) (101/64 - 109/70)  RR: 18 (10-04-21 @ 15:55) (18 - 18)  SpO2: 96% (10-04-21 @ 15:55) (96% - 97%)  Wt(kg): --  ,   I&O's Summary    03 Oct 2021 07:01  -  04 Oct 2021 07:00  --------------------------------------------------------  IN: 980 mL / OUT: 0 mL / NET: 980 mL    04 Oct 2021 07:  -  04 Oct 2021 22:11  --------------------------------------------------------  IN: 200 mL / OUT: 0 mL / NET: 200 mL             *****PHYSICAL EXAM:   alert oriented x 3 attention comprehension are fair.  Able to name, repeat.   EOmi fundi not visualized   no nystagmus VFF to confrontation  Tongue is midline  Palate elevates symmetrically   Moving all 4 ext spontaneously no drift appreciated    Gait not assessed.            *****LAB AND IMAGIN.3   12.73 )-----------( 354      ( 04 Oct 2021 06:40 )             36.5               10    138  |  105  |  10  ----------------------------<  112<H>  4.0   |  21<L>  |  0.50    Ca    8.3<L>      04 Oct 2021 06:38    TPro  5.8<L>  /  Alb  2.8<L>  /  TBili  5.5<H>  /  DBili  x   /  AST  367<H>  /  ALT  851<H>  /  AlkPhos  254<H>  10-04    PT/INR - ( 04 Oct 2021 06:42 )   PT: 12.9 sec;   INR: 1.08 ratio         PTT - ( 04 Oct 2021 06:42 )  PTT:28.1 sec                     [All pertinent recent Imaging/Reports reviewed]           *****A S S E S S M E N T   A N D   P L A N :          Excerpt from H&P,"    60F with PMH of chronic lower back pain 2/2 degenerative changes, chronic smoker, h/o COPD, noncompliant with treatment, had a CT C/A/P done in 2021 2/2 chronic pulmonary nodules and right adrenal adenoma monitoring.  ptn states for the past 2 weeks her urine is brown, she feels nauseous, has diffuse abdominal and back pain, denies dysuria. states she has fevers but hasnt checked her temps. states she has chills  One episode of NBNB emesis 2 weeks ago. Reports constipation with occasional soft stools. never had a colonoscopy, refused COVID vaccine, [facility]. Denies taking Tylenol.    Denies chest pain, leg swelling or pain.          Problem/Recommendations 1: back pain   chronic   continue gabapentin 100 tid   continue lactulose monitor bms  no complaints of back pain    will lower gabapentin 100 qhs due to complaints of lethargy   tolerating once a day dosing       Problem/Recommendations 2:  transaminitis    pt reports taking lipitor previously   biopsy results acute hepatitis   lfts trending down.    thiamine iv push can be considered      Thank you for allowing me to participate in the care of this patient. Will continue to follow patient periodically. Please do not hesitate to call me if you have any  questions or if there has been a change in patients neurological status     ________________  Ruth Chung MD  [facility]  [phone]      33 minutes spent on total encounter; more than 50 % of the visit was  spent counseling about plan of care, compliance to diet/exercise and medication regimen and or  coordinating care by the attending physician.      It is advised that stroke patients follow up with WALTER Weaver @ [phone] in 1- 2 weeks.   Others please follow up with Dr. Michael Nissenbaum [phone]

## 2021-10-04 NOTE — PROGRESS NOTE ADULT - ATTENDING COMMENTS
- AIH with jaundice, improving: continue prednisone 60 mg/d  - continue ursodiol  pt has appointment with Dr. Serra on Friday    - can d/c soon

## 2021-10-04 NOTE — PROGRESS NOTE ADULT - SUBJECTIVE AND OBJECTIVE BOX
Interval Events:   No acute events overnight.  Patient denies any acute symptoms at this time.  Eating breakfast comfortably.    ROS:   A 12-point ROS was performed and negative except as noted in HPI.    Hospital Medications:  chlorhexidine 2% Cloths 1 Application(s) Topical daily  gabapentin 100 milliGRAM(s) Oral at bedtime  influenza   Vaccine 0.5 milliLiter(s) IntraMuscular once  lactulose Syrup 15 Gram(s) Oral three times a day  levothyroxine 25 MICROGram(s) Oral daily  polyethylene glycol 3350 17 Gram(s) Oral daily  potassium chloride    Tablet ER 40 milliEquivalent(s) Oral once  potassium chloride    Tablet ER 20 milliEquivalent(s) Oral daily  predniSONE   Tablet 60 milliGRAM(s) Oral daily  senna 2 Tablet(s) Oral at bedtime  sodium chloride 0.9%. 1000 milliLiter(s) IV Continuous <Continuous>  thiamine Injectable 100 milliGRAM(s) IV Push daily  ursodiol Tablet 500 milliGRAM(s) Oral every 12 hours      PHYSICAL EXAM:   Vital Signs:  Vital Signs Last 24 Hrs  T(C): 36.8 (04 Oct 2021 08:55), Max: 36.8 (03 Oct 2021 15:55)  T(F): 98.2 (04 Oct 2021 08:55), Max: 98.2 (03 Oct 2021 15:55)  HR: 73 (04 Oct 2021 08:55) (55 - 77)  BP: 101/64 (04 Oct 2021 08:55) (101/64 - 103/62)  BP(mean): --  RR: 18 (04 Oct 2021 08:55) (18 - 18)  SpO2: 97% (04 Oct 2021 08:55) (95% - 97%)  Daily     Daily     GENERAL: no acute distress  NEURO: alert  HEENT: icteric sclera, no conjunctival pallor appreciated  CHEST: no respiratory distress, no accessory muscle use  CARDIAC: regular rate, +S1/S2  ABDOMEN: soft, nondistended, nontender, no rebound or guarding  EXTREMITIES: warm, well perfused, no edema  SKIN: no lesions noted    LABS: reviewed                        12.3   12.73 )-----------( 354      ( 04 Oct 2021 06:40 )             36.5     10-04    138  |  105  |  10  ----------------------------<  112<H>  4.0   |  21<L>  |  0.50    Ca    8.3<L>      04 Oct 2021 06:38    TPro  5.8<L>  /  Alb  2.8<L>  /  TBili  5.5<H>  /  DBili  x   /  AST  367<H>  /  ALT  851<H>  /  AlkPhos  254<H>  10-04    LIVER FUNCTIONS - ( 04 Oct 2021 06:38 )  Alb: 2.8 g/dL / Pro: 5.8 g/dL / ALK PHOS: 254 U/L / ALT: 851 U/L / AST: 367 U/L / GGT: x             Interval Diagnostic Studies: see sunrise for full report

## 2021-10-04 NOTE — PROGRESS NOTE ADULT - SUBJECTIVE AND OBJECTIVE BOX
Patient is a 60y old  Female who presents with a chief complaint of hepatitis, jaundice (04 Oct 2021 13:04)      SUBJECTIVE / OVERNIGHT EVENTS: LFTs improving, Ammoniais same, PT/INR have normalized, s/p 2 doses IV Vit K, steroids changed from medrol to po prdnisone 60 mg daily. MR confirms cirrhosis, no sign of HCC,  also has varices c/w portal HTN    MEDICATIONS  (STANDING):  chlorhexidine 2% Cloths 1 Application(s) Topical daily  gabapentin 100 milliGRAM(s) Oral at bedtime  influenza   Vaccine 0.5 milliLiter(s) IntraMuscular once  lactulose Syrup 15 Gram(s) Oral three times a day  levothyroxine 25 MICROGram(s) Oral daily  polyethylene glycol 3350 17 Gram(s) Oral daily  potassium chloride    Tablet ER 40 milliEquivalent(s) Oral once  potassium chloride    Tablet ER 20 milliEquivalent(s) Oral daily  predniSONE   Tablet 60 milliGRAM(s) Oral daily  senna 2 Tablet(s) Oral at bedtime  sodium chloride 0.9%. 1000 milliLiter(s) (75 mL/Hr) IV Continuous <Continuous>  thiamine Injectable 100 milliGRAM(s) IV Push daily  ursodiol Tablet 500 milliGRAM(s) Oral every 12 hours    MEDICATIONS  (PRN):      Vital Signs Last 24 Hrs  T(F): 98.6 (10-04-21 @ 15:55), Max: 98.6 (10-04-21 @ 15:55)  HR: 57 (10-04-21 @ 15:55) (57 - 77)  BP: 109/70 (10-04-21 @ 15:55) (101/64 - 109/70)  RR: 18 (10-04-21 @ 15:55) (18 - 18)  SpO2: 96% (10-04-21 @ 15:55) (96% - 97%)  Telemetry:   CAPILLARY BLOOD GLUCOSE        I&O's Summary    03 Oct 2021 07:01  -  04 Oct 2021 07:00  --------------------------------------------------------  IN: 980 mL / OUT: 0 mL / NET: 980 mL        PHYSICAL EXAM:  GENERAL: NAD, well-developed  HEAD:  Atraumatic, Normocephalic  EYES: EOMI, PERRLA, conjunctiva and sclera clear  NECK: Supple, No JVD  CHEST/LUNG: Clear to auscultation bilaterally; No wheeze  HEART: Regular rate and rhythm; No murmurs, rubs, or gallops  ABDOMEN: Soft, Nontender, Nondistended; Bowel sounds present  EXTREMITIES:  2+ Peripheral Pulses, No clubbing, cyanosis, or edema  PSYCH: AAOx3  NEUROLOGY: non-focal  SKIN: No rashes or lesions    LABS:                        12.3   12.73 )-----------( 354      ( 04 Oct 2021 06:40 )             36.5     10-04    138  |  105  |  10  ----------------------------<  112<H>  4.0   |  21<L>  |  0.50    Ca    8.3<L>      04 Oct 2021 06:38    TPro  5.8<L>  /  Alb  2.8<L>  /  TBili  5.5<H>  /  DBili  x   /  AST  367<H>  /  ALT  851<H>  /  AlkPhos  254<H>  10-04    PT/INR - ( 04 Oct 2021 06:42 )   PT: 12.9 sec;   INR: 1.08 ratio         PTT - ( 04 Oct 2021 06:42 )  PTT:28.1 sec          RADIOLOGY & ADDITIONAL TESTS:    Imaging Personally Reviewed:    Consultant(s) Notes Reviewed:      Care Discussed with Consultants/Other Providers:

## 2021-10-04 NOTE — PHYSICAL THERAPY INITIAL EVALUATION ADULT - ADDITIONAL COMMENTS
Pt resides in a pvt home w/ spouse, ~3 steps to enter, 1 flight up to bedroom/bathroom, & 1 flight down to basement. PTA pt was independent w/ all ADL's & mobility, & utilized a straight cane for ambulation.

## 2021-10-05 ENCOUNTER — TRANSCRIPTION ENCOUNTER (OUTPATIENT)
Age: 61
End: 2021-10-05

## 2021-10-05 VITALS
HEART RATE: 57 BPM | SYSTOLIC BLOOD PRESSURE: 109 MMHG | RESPIRATION RATE: 18 BRPM | OXYGEN SATURATION: 95 % | DIASTOLIC BLOOD PRESSURE: 68 MMHG | TEMPERATURE: 99 F

## 2021-10-05 LAB
ALBUMIN SERPL ELPH-MCNC: 2.9 G/DL — LOW (ref 3.3–5)
ALP SERPL-CCNC: 240 U/L — HIGH (ref 40–120)
ALT FLD-CCNC: 680 U/L — HIGH (ref 10–45)
AMMONIA BLD-MCNC: 56 UMOL/L — HIGH (ref 11–55)
ANION GAP SERPL CALC-SCNC: 11 MMOL/L — SIGNIFICANT CHANGE UP (ref 5–17)
APTT BLD: 27.4 SEC — LOW (ref 27.5–35.5)
AST SERPL-CCNC: 265 U/L — HIGH (ref 10–40)
BILIRUB SERPL-MCNC: 4.7 MG/DL — HIGH (ref 0.2–1.2)
BUN SERPL-MCNC: 12 MG/DL — SIGNIFICANT CHANGE UP (ref 7–23)
CALCIUM SERPL-MCNC: 8 MG/DL — LOW (ref 8.4–10.5)
CHLORIDE SERPL-SCNC: 107 MMOL/L — SIGNIFICANT CHANGE UP (ref 96–108)
CO2 SERPL-SCNC: 22 MMOL/L — SIGNIFICANT CHANGE UP (ref 22–31)
CREAT SERPL-MCNC: 0.63 MG/DL — SIGNIFICANT CHANGE UP (ref 0.5–1.3)
FIBRINOGEN PPP-MCNC: 173 MG/DL — LOW (ref 290–520)
GLUCOSE SERPL-MCNC: 84 MG/DL — SIGNIFICANT CHANGE UP (ref 70–99)
HCT VFR BLD CALC: 37.8 % — SIGNIFICANT CHANGE UP (ref 34.5–45)
HGB BLD-MCNC: 13.2 G/DL — SIGNIFICANT CHANGE UP (ref 11.5–15.5)
INR BLD: 1.09 RATIO — SIGNIFICANT CHANGE UP (ref 0.88–1.16)
LACTATE SERPL-SCNC: 1.2 MMOL/L — SIGNIFICANT CHANGE UP (ref 0.7–2)
MCHC RBC-ENTMCNC: 30.1 PG — SIGNIFICANT CHANGE UP (ref 27–34)
MCHC RBC-ENTMCNC: 34.9 GM/DL — SIGNIFICANT CHANGE UP (ref 32–36)
MCV RBC AUTO: 86.1 FL — SIGNIFICANT CHANGE UP (ref 80–100)
NRBC # BLD: 0 /100 WBCS — SIGNIFICANT CHANGE UP (ref 0–0)
PLATELET # BLD AUTO: 385 K/UL — SIGNIFICANT CHANGE UP (ref 150–400)
POTASSIUM SERPL-MCNC: 4.3 MMOL/L — SIGNIFICANT CHANGE UP (ref 3.5–5.3)
POTASSIUM SERPL-SCNC: 4.3 MMOL/L — SIGNIFICANT CHANGE UP (ref 3.5–5.3)
PROT SERPL-MCNC: 6 G/DL — SIGNIFICANT CHANGE UP (ref 6–8.3)
PROTHROM AB SERPL-ACNC: 13 SEC — SIGNIFICANT CHANGE UP (ref 10.6–13.6)
RBC # BLD: 4.39 M/UL — SIGNIFICANT CHANGE UP (ref 3.8–5.2)
RBC # FLD: 21.3 % — HIGH (ref 10.3–14.5)
SODIUM SERPL-SCNC: 140 MMOL/L — SIGNIFICANT CHANGE UP (ref 135–145)
SOLUBLE LIVER IGG SER IA-ACNC: 1.6 — SIGNIFICANT CHANGE UP (ref 0–20)
WBC # BLD: 11.35 K/UL — HIGH (ref 3.8–10.5)
WBC # FLD AUTO: 11.35 K/UL — HIGH (ref 3.8–10.5)

## 2021-10-05 PROCEDURE — 99231 SBSQ HOSP IP/OBS SF/LOW 25: CPT

## 2021-10-05 RX ORDER — POTASSIUM CHLORIDE 20 MEQ
1 PACKET (EA) ORAL
Qty: 30 | Refills: 0
Start: 2021-10-05 | End: 2021-11-03

## 2021-10-05 RX ORDER — URSODIOL 250 MG/1
1 TABLET, FILM COATED ORAL
Qty: 60 | Refills: 0
Start: 2021-10-05 | End: 2021-11-03

## 2021-10-05 RX ORDER — ATORVASTATIN CALCIUM 80 MG/1
1 TABLET, FILM COATED ORAL
Qty: 0 | Refills: 0 | DISCHARGE

## 2021-10-05 RX ORDER — THIAMINE MONONITRATE (VIT B1) 100 MG
1 TABLET ORAL
Qty: 30 | Refills: 0
Start: 2021-10-05 | End: 2021-11-03

## 2021-10-05 RX ORDER — SENNA PLUS 8.6 MG/1
2 TABLET ORAL
Qty: 0 | Refills: 0 | DISCHARGE
Start: 2021-10-05

## 2021-10-05 RX ORDER — LEVOTHYROXINE SODIUM 125 MCG
1 TABLET ORAL
Qty: 0 | Refills: 0 | DISCHARGE
Start: 2021-10-05

## 2021-10-05 RX ORDER — POLYETHYLENE GLYCOL 3350 17 G/17G
17 POWDER, FOR SOLUTION ORAL
Qty: 0 | Refills: 0 | DISCHARGE
Start: 2021-10-05

## 2021-10-05 RX ORDER — LACTULOSE 10 G/15ML
22.5 SOLUTION ORAL
Qty: 945 | Refills: 0
Start: 2021-10-05 | End: 2021-10-18

## 2021-10-05 RX ORDER — LEVOTHYROXINE SODIUM 125 MCG
1 TABLET ORAL
Qty: 0 | Refills: 0 | DISCHARGE

## 2021-10-05 RX ORDER — GABAPENTIN 400 MG/1
1 CAPSULE ORAL
Qty: 30 | Refills: 0
Start: 2021-10-05 | End: 2021-11-03

## 2021-10-05 RX ADMIN — Medication 25 MICROGRAM(S): at 05:22

## 2021-10-05 RX ADMIN — Medication 60 MILLIGRAM(S): at 05:22

## 2021-10-05 RX ADMIN — URSODIOL 500 MILLIGRAM(S): 250 TABLET, FILM COATED ORAL at 05:22

## 2021-10-05 RX ADMIN — LACTULOSE 15 GRAM(S): 10 SOLUTION ORAL at 14:30

## 2021-10-05 RX ADMIN — Medication 20 MILLIEQUIVALENT(S): at 11:24

## 2021-10-05 RX ADMIN — URSODIOL 500 MILLIGRAM(S): 250 TABLET, FILM COATED ORAL at 19:26

## 2021-10-05 RX ADMIN — Medication 100 MILLIGRAM(S): at 12:37

## 2021-10-05 RX ADMIN — CHLORHEXIDINE GLUCONATE 1 APPLICATION(S): 213 SOLUTION TOPICAL at 11:24

## 2021-10-05 NOTE — DISCHARGE NOTE NURSING/CASE MANAGEMENT/SOCIAL WORK - PATIENT PORTAL LINK FT
You can access the FollowMyHealth Patient Portal offered by [facility] by registering at the following website: [email or web address]. By joining [employer]s FollowMyHealth portal, you will also be able to view your health information using other applications (apps) compatible with our system.

## 2021-10-05 NOTE — PROGRESS NOTE ADULT - SUBJECTIVE AND OBJECTIVE BOX
Patient is a 60y old  Female who presents with a chief complaint of hepatitis, jaundice (05 Oct 2021 14:36)      SUBJECTIVE / OVERNIGHT EVENTS: ptn feels well, cleared by hepatology for DC home    MEDICATIONS  (STANDING):  chlorhexidine 2% Cloths 1 Application(s) Topical daily  gabapentin 100 milliGRAM(s) Oral at bedtime  influenza   Vaccine 0.5 milliLiter(s) IntraMuscular once  lactulose Syrup 15 Gram(s) Oral three times a day  levothyroxine 25 MICROGram(s) Oral daily  polyethylene glycol 3350 17 Gram(s) Oral daily  potassium chloride    Tablet ER 40 milliEquivalent(s) Oral once  potassium chloride    Tablet ER 20 milliEquivalent(s) Oral daily  predniSONE   Tablet 60 milliGRAM(s) Oral daily  senna 2 Tablet(s) Oral at bedtime  sodium chloride 0.9%. 1000 milliLiter(s) (75 mL/Hr) IV Continuous <Continuous>  thiamine Injectable 100 milliGRAM(s) IV Push daily  ursodiol Tablet 500 milliGRAM(s) Oral every 12 hours    MEDICATIONS  (PRN):      Vital Signs Last 24 Hrs  T(F): 97.7 (10-05-21 @ 08:28), Max: 98.6 (10-04-21 @ 15:55)  HR: 67 (10-05-21 @ 08:28) (55 - 67)  BP: 117/78 (10-05-21 @ 08:28) (104/66 - 117/78)  RR: 18 (10-05-21 @ 08:28) (18 - 18)  SpO2: 95% (10-05-21 @ 08:28) (95% - 96%)  Telemetry:   CAPILLARY BLOOD GLUCOSE        I&O's Summary    04 Oct 2021 07:01  -  05 Oct 2021 07:00  --------------------------------------------------------  IN: 590 mL / OUT: 0 mL / NET: 590 mL    05 Oct 2021 07:01  -  05 Oct 2021 15:19  --------------------------------------------------------  IN: 500 mL / OUT: 0 mL / NET: 500 mL        PHYSICAL EXAM:  GENERAL: NAD, well-developed  HEAD:  Atraumatic, Normocephalic  EYES: EOMI, PERRLA, conjunctiva and sclera clear  NECK: Supple, No JVD  CHEST/LUNG: Clear to auscultation bilaterally; No wheeze  HEART: Regular rate and rhythm; No murmurs, rubs, or gallops  ABDOMEN: Soft, Nontender, Nondistended; Bowel sounds present  EXTREMITIES:  2+ Peripheral Pulses, No clubbing, cyanosis, or edema  PSYCH: AAOx3  NEUROLOGY: non-focal  SKIN: No rashes or lesions    LABS:                        13.2   11.35 )-----------( 385      ( 05 Oct 2021 06:53 )             37.8     10-05    140  |  107  |  12  ----------------------------<  84  4.3   |  22  |  0.63    Ca    8.0<L>      05 Oct 2021 06:46    TPro  6.0  /  Alb  2.9<L>  /  TBili  4.7<H>  /  DBili  x   /  AST  265<H>  /  ALT  680<H>  /  AlkPhos  240<H>  10-05    PT/INR - ( 05 Oct 2021 06:53 )   PT: 13.0 sec;   INR: 1.09 ratio         PTT - ( 05 Oct 2021 06:53 )  PTT:27.4 sec          RADIOLOGY & ADDITIONAL TESTS:    Imaging Personally Reviewed:    Consultant(s) Notes Reviewed:      Care Discussed with Consultants/Other Providers:

## 2021-10-05 NOTE — DISCHARGE NOTE PROVIDER - NSDCMRMEDTOKEN_GEN_ALL_CORE_FT
gabapentin 100 mg oral capsule: 1 cap(s) orally once a day (at bedtime)  lactulose 10 g/15 mL oral syrup: 22.5 milliliter(s) orally 3 times a day  levothyroxine 25 mcg (0.025 mg) oral tablet: 1 tab(s) orally once a day  polyethylene glycol 3350 oral powder for reconstitution: 17 gram(s) orally once a day  potassium chloride 20 mEq oral tablet, extended release: 1 tab(s) orally once a day  predniSONE 20 mg oral tablet: 3 tab(s) orally once a day  senna oral tablet: 2 tab(s) orally once a day (at bedtime)  thiamine 100 mg oral tablet: 1 tab(s) orally once a day  ursodiol 500 mg oral tablet: 1 tab(s) orally every 12 hours

## 2021-10-05 NOTE — DISCHARGE NOTE PROVIDER - CARE PROVIDER_API CALL
Bennie Serra)  Gastroenterology; Internal Medicine; Transplant Hepatology  [address] [place] [address]  Phone: [phone]  Fax: [phone]  Follow Up Time:

## 2021-10-05 NOTE — PROGRESS NOTE ADULT - REASON FOR ADMISSION
hepatitis, jaundice
inc lft
hepatitis, jaundice

## 2021-10-05 NOTE — DISCHARGE NOTE NURSING/CASE MANAGEMENT/SOCIAL WORK - NSDCPEWEB_GEN_ALL_CORE
[facility] for Tobacco Control website --- [email or web address] website --- [email or web address]

## 2021-10-05 NOTE — DISCHARGE NOTE PROVIDER - HOSPITAL COURSE
60F with PMH of chronic lower back pain 2/2 degenerative changes, chronic smoker, h/o COPD, noncompliant with treatment, had a CT C/A/P done in 4/2021 2/2 chronic pulmonary nodules and right adrenal adenoma monitoring. Ptn states for the past 2 weeks her urine is brown, she feels nauseous, has diffuse abdominal and back pain, denies dysuria. states she has fevers but hasnt checked her temps. states she has chills, One episode of NBNB emesis 2 weeks ago. Reports constipation with occasional soft stools. never had a colonoscopy, refused COVID vaccine, has a covid swab today at an urgent  center: negative.   Hepatology consulted : Pt with no prior personal or family history of liver disease, elevated liver tests, or autoimmune conditions [aside from possible autoimmune hypothyroidism  Symptomatic jaundice, dark urine, and abdominal pain leading up to hospitalization.  She also endorsed some "brain fog."  She only takes Synthroid and Atorvastatin at home, and denies over the counter medications, herbal remedies, or dietary supplements.    -MRCP 9/25/2021 reveals cirrhosis.  Liver chemistries with peak elevations of ALT 1500, AST 2300, TB 14.6, .    -Liver biopsy performed 9/28/2021.  HAV IgM, HBsAg, HBcAb, and HCV Ab nonreactive. Hep C RNA neg, HBV PCR negative, +FARIDEH 1:640, +ASMA 1:40, negative AMA, anti-LKM negative.  Negative acetaminophen level.  IgG within normal limits 1511, IgA elevated at 786, IgM within normal limits at 111.   Pt started on ursodiol and Acetylcysteine, Ammonia is same, PT/INR have normalized, s/p 2 doses IV Vit K, steroids changed from medrol to po prednisone 60 mg daily.  LFTs improving,MR confirms cirrhosis, no sign of HCC,  also has varices c/w portal HTN.  -liver biopsy consistent with DILI vs autoimmune hepatitis vs infectious hepatitis; no evidence of chronic liver disease   Pt now is medically stable for discharge per discussion with Hepatology and Dr. Garcia  and has follow up appointment with the Hepatologist this Friday 10/8

## 2021-10-05 NOTE — DISCHARGE NOTE PROVIDER - NS AS DC PROVIDER CONTACT Y/N MULTI
Patient would like communication of their results via:        Cell Phone:   Telephone Information:   Mobile [phone]     Okay to leave a message containing results? Yes     Yes

## 2021-10-05 NOTE — PROGRESS NOTE ADULT - SUBJECTIVE AND OBJECTIVE BOX
[facility]      Seen earlier today, and examined.  - Today, patient is without complaints.           *****MEDICATIONS: Current medication reviewed and documented.    MEDICATIONS  (STANDING):  chlorhexidine 2% Cloths 1 Application(s) Topical daily  gabapentin 100 milliGRAM(s) Oral at bedtime  influenza   Vaccine 0.5 milliLiter(s) IntraMuscular once  lactulose Syrup 15 Gram(s) Oral three times a day  levothyroxine 25 MICROGram(s) Oral daily  polyethylene glycol 3350 17 Gram(s) Oral daily  potassium chloride    Tablet ER 40 milliEquivalent(s) Oral once  potassium chloride    Tablet ER 20 milliEquivalent(s) Oral daily  predniSONE   Tablet 60 milliGRAM(s) Oral daily  senna 2 Tablet(s) Oral at bedtime  sodium chloride 0.9%. 1000 milliLiter(s) (75 mL/Hr) IV Continuous <Continuous>  thiamine Injectable 100 milliGRAM(s) IV Push daily  ursodiol Tablet 500 milliGRAM(s) Oral every 12 hours    MEDICATIONS  (PRN):          ***** VITAL SIGNS:  T(F): 97.7 (10-05-21 @ 08:28), Max: 98.6 (10-04-21 @ 15:55)  HR: 67 (10-05-21 @ 08:28) (55 - 67)  BP: 117/78 (10-05-21 @ 08:28) (104/66 - 117/78)  RR: 18 (10-05-21 @ 08:28) (18 - 18)  SpO2: 95% (10-05-21 @ 08:28) (95% - 96%)  Wt(kg): --  ,   I&O's Summary    04 Oct 2021 07:01  -  05 Oct 2021 07:00  --------------------------------------------------------  IN: 590 mL / OUT: 0 mL / NET: 590 mL    05 Oct 2021 07:  -  05 Oct 2021 14:37  --------------------------------------------------------  IN: 500 mL / OUT: 0 mL / NET: 500 mL             *****PHYSICAL EXAM:   alert oriented x 3 attention comprehension are fair.  Able to name, repeat.   EOmi fundi not visualized   no nystagmus VFF to confrontation  Tongue is midline  Palate elevates symmetrically   Moving all 4 ext spontaneously      Gait not assessed.            *****LAB AND IMAGIN.2   11.35 )-----------( 385      ( 05 Oct 2021 06:53 )             37.8               10    140  |  107  |  12  ----------------------------<  84  4.3   |  22  |  0.63    Ca    8.0<L>      05 Oct 2021 06:46    TPro  6.0  /  Alb  2.9<L>  /  TBili  4.7<H>  /  DBili  x   /  AST  265<H>  /  ALT  680<H>  /  AlkPhos  240<H>  10    PT/INR - ( 05 Oct 2021 06:53 )   PT: 13.0 sec;   INR: 1.09 ratio         PTT - ( 05 Oct 2021 06:53 )  PTT:27.4 sec                     [All pertinent recent Imaging/Reports reviewed]           *****A S S E S S M E N T   A N D   P L A N :  Excerpt from H&P,"    60F with PMH of chronic lower back pain 2/2 degenerative changes, chronic smoker, h/o COPD, noncompliant with treatment, had a CT C/A/P done in 2021 2/2 chronic pulmonary nodules and right adrenal adenoma monitoring.  ptn states for the past 2 weeks her urine is brown, she feels nauseous, has diffuse abdominal and back pain, denies dysuria. states she has fevers but hasnt checked her temps. states she has chills  One episode of NBNB emesis 2 weeks ago. Reports constipation with occasional soft stools. never had a colonoscopy, refused COVID vaccine, has a covid swab today at an urgi center: negative. Denies taking Tylenol.    Denies chest pain, leg swelling or pain.          Problem/Recommendations 1: back pain   chronic   continue gabapentin 100 tid   continue lactulose monitor bms  no complaints of back pain    will lower gabapentin 100 qhs due to complaints of lethargy   tolerating once a day dosing       Problem/Recommendations 2:  transaminitis    pt reports taking lipitor previously   biopsy results acute hepatitis   lfts trending down.                  Thank you for allowing me to participate in the care of this patient. Will continue to follow patient periodically. Please do not hesitate to call me if you have any  questions or if there has been a change in patients neurological status     ________________  Ruth Chung MD  [facility]  [phone]      33 minutes spent on total encounter; more than 50 % of the visit was  spent counseling about plan of care, compliance to diet/exercise and medication regimen and or  coordinating care by the attending physician.      It is advised that stroke patients follow up with WALTER Weaver @ [phone] in 1- 2 weeks.   Others please follow up with Dr. Michael Nissenbaum [phone]

## 2021-10-05 NOTE — PROGRESS NOTE ADULT - SUBJECTIVE AND OBJECTIVE BOX
Patient is a 60y Female     Patient is a 60y old  Female who presents with a chief complaint of hepatitis, jaundice (04 Oct 2021 16:52)      HPI:   60F with PMH of chronic lower back pain 2/2 degenerative changes, chronic smoker, h/o COPD, noncompliant with treatment, had a CT C/A/P done in 4/2021 2/2 chronic pulmonary nodules and right adrenal adenoma monitoring.  ptn states for the past 2 weeks her urine is brown, she feels nauseous, has diffuse abdominal and back pain, denies dysuria. states she has fevers but hasnt checked her temps. states she has chills  One episode of NBNB emesis 2 weeks ago. Reports constipation with occasional soft stools. never had a colonoscopy, refused COVID vaccine, has a covid swab today at an [facility] center: negative. Denies taking Tylenol.    Denies chest pain, leg swelling or pain. (24 Sep 2021 18:33)      PAST MEDICAL & SURGICAL HISTORY:  Hypothyroidism    COPD not affecting current episode of care    Adrenal adenoma    Low back pain    DJD (degenerative joint disease)    Nicotine addiction        MEDICATIONS  (STANDING):  chlorhexidine 2% Cloths 1 Application(s) Topical daily  gabapentin 100 milliGRAM(s) Oral at bedtime  influenza   Vaccine 0.5 milliLiter(s) IntraMuscular once  lactulose Syrup 15 Gram(s) Oral three times a day  levothyroxine 25 MICROGram(s) Oral daily  polyethylene glycol 3350 17 Gram(s) Oral daily  potassium chloride    Tablet ER 40 milliEquivalent(s) Oral once  potassium chloride    Tablet ER 20 milliEquivalent(s) Oral daily  predniSONE   Tablet 60 milliGRAM(s) Oral daily  senna 2 Tablet(s) Oral at bedtime  sodium chloride 0.9%. 1000 milliLiter(s) (75 mL/Hr) IV Continuous <Continuous>  thiamine Injectable 100 milliGRAM(s) IV Push daily  ursodiol Tablet 500 milliGRAM(s) Oral every 12 hours      Allergies    penicillin (Rash)    Intolerances        SOCIAL HISTORY:  Denies ETOh,Smoking,     FAMILY HISTORY:      REVIEW OF SYSTEMS:    CONSTITUTIONAL: No weakness, fevers or chills  EYES/ENT: No visual changes;  No vertigo or throat pain   NECK: No pain or stiffness  RESPIRATORY: No cough, wheezing, hemoptysis; No shortness of breath  CARDIOVASCULAR: No chest pain or palpitations  GASTROINTESTINAL: No abdominal or epigastric pain. No nausea, vomiting, or hematemesis; No diarrhea or constipation. No melena or hematochezia.  GENITOURINARY: No dysuria, frequency or hematuria  NEUROLOGICAL: No numbness or weakness  SKIN: No itching, burning, rashes, or lesions   All other review of systems is negative unless indicated above.    VITAL:  T(C): , Max: 37 (10-04-21 @ 15:55)  T(F): , Max: 98.6 (10-04-21 @ 15:55)  HR: 55 (10-05-21 @ 00:03)  BP: 104/66 (10-05-21 @ 00:03)  BP(mean): --  RR: 18 (10-05-21 @ 00:03)  SpO2: 95% (10-05-21 @ 00:03)  Wt(kg): --    I and O's:    10-03 @ 07:01  -  10-04 @ 07:00  --------------------------------------------------------  IN: 980 mL / OUT: 0 mL / NET: 980 mL    10-04 @ 07:01  -  10-05 @ 07:00  --------------------------------------------------------  IN: 590 mL / OUT: 0 mL / NET: 590 mL          PHYSICAL EXAM:    Constitutional: NAD  HEENT: PERRLA,   Neck: No JVD  Respiratory: CTA B/L  Cardiovascular: S1 and S2  Gastrointestinal: BS+, soft, NT/ND  Extremities: No peripheral edema  Neurological: A/O x 3, no focal deficits  Psychiatric: Normal mood, normal affect  : No Cee  Skin: No rashes  Access: Not applicable  Back: No CVA tenderness    LABS:                        12.3   12.73 )-----------( 354      ( 04 Oct 2021 06:40 )             36.5     10-05    140  |  107  |  12  ----------------------------<  84  4.3   |  22  |  0.63    Ca    8.0<L>      05 Oct 2021 06:46    TPro  6.0  /  Alb  2.9<L>  /  TBili  4.7<H>  /  DBili  x   /  AST  265<H>  /  ALT  x   /  AlkPhos  240<H>  10-05          RADIOLOGY & ADDITIONAL STUDIES:

## 2021-10-05 NOTE — PROGRESS NOTE ADULT - ASSESSMENT
59 yo woman with c/o painless jaundice, has chronic LBP  work up c/w hepatocellular dz with acute hepatitis of unclear etiology  ct A/P c/w cirrhosis, but nl platelets though elevated PT  mrcp done, confirm cirrhosis, no billiary dz  MRI LS spine c/w DJD  cont  on Neuronitn , pain improved  FARIDEH is positive, has nausea, poor appetite, will plan for liver Biopsy and place on budenoside after as per GI  lactulose prn constipation
59 yo woman with c/o painless jaundice, has chronic LBP  work up c/w hepatocellular dz with acute hepatitis of unclear etiology  ct A/P c/w cirrhosis, but nl platelets though elevated PT  mrcp done, confirm cirrhosis, no billiary dz  will also MRI LS spine  cont  on Neuronitn   lactulose for constipation
60 year old female with history of COPD, chronic back pain, and hypothyroidism who presents with jaundice.      -Concern for autoimmune hepatitis vs DILI. severe hepatitis on pathology,  hepatology called, PT/INR rising, steroids switched to IV Medrol, will need close trending of LFTs, Ammonia PT/INR,   - Cirrhosis  - She is currently AAO x 3 without asterixis but she needs to be monitored closely for encephalopathy   - She only takes Synthroid and Atorvastatin at home, denies over the counter medications, herbal remedies, or dietary supplements    - MRCP 9/25/2021 reveals cirrhosis.  Liver chemistries with peak elevations of ALT 1500, AST 2300, TB 14.6, .    - Liver biopsy performed 9/28/2021.  viral hepatitis panel is nonreactive.  +FARIDEH 1:640, +ASMA however only 1:40, and negative AMA.  -as per hepatology: assess for autoimmune etiology with anti-LKM [liver kidney microsomal antibody], anti-sLA [soluble liver antigen antibody], and quantitative immunoglobulins [IgG, IgA, IgM], send TPMT enzyme activity  - d/w in detail w Dr. TRUJILLO                        
60 year old female with history of COPD, chronic back pain, and hypothyroidism who presents with jaundice.      -Concern for autoimmune hepatitis vs DILI. severe hepatitis on pathology,  hepatology on board, PT/INR rising, steroids switched to IV Medrol on 9/30, will need close trending of LFTs, Ammonia PT/INR,   - Cirrhosis  - She is currently AAO x 3 without asterixis but she needs to be monitored closely for encephalopathy  - 10/1 started on ursodiol and Acetylcysteine, cont medrol IV, given 1/3 doses IV VIT K 10 mg as per GI,   - 10/02:  LFTs improving, NAC stopped, ongoing Medrol 60 mg daily, s/p 2/3 doses IV Vit K, PT/INR improving as well  - 10/3: symptoms are improving, starting to eat, LFTs improving, AMMONIA rising, will give lactulose today and rpt in am    - She only takes Synthroid and Atorvastatin at home, denies over the counter medications, herbal remedies, or dietary supplements    - MRCP 9/25/2021 reveals cirrhosis.  trending daily LFTs  - Liver biopsy performed 9/28/2021.  viral hepatitis panel is nonreactive.  +FARIDEH 1:640, +ASMA 1:40, and negative AMA.  -as per hepatology: assess for autoimmune etiology with anti-LKM [liver kidney microsomal antibody], anti-sLA [soluble liver antigen antibody], and quantitative immunoglobulins [IgG, IgA, IgM], send TPMT enzyme activity  - d/w in detail w Dr. TRUJILLO and Dr. Cox                        
60 year old female with history of COPD, chronic back pain, and hypothyroidism who presents with jaundice.      -Concern for autoimmune hepatitis vs DILI. severe hepatitis on pathology,  hepatology on board, PT/INR rising, steroids switched to IV Medrol on 9/30, will need close trending of LFTs, Ammonia PT/INR,   - Cirrhosis  - She is currently AAO x 3 without asterixis but she needs to be monitored closely for encephalopathy  - 10/1 started on ursodiol and Acetylcysteine, cont medrol IV, given 1/3 doses IV VIT K 10 mg as per GI,   - 10/02:  LFTs improving, NAC stopped, ongoing Medrol 60 mg daily, s/p 2/3 doses IV Vit K, PT/INR improving as well  - 10/3: symptoms are improving, starting to eat, LFTs improving, AMMONIA rising, will give lactulose today and rpt in am  - 10/4-10/5:  LFTs improving, Ammonia is same, PT/INR have normalized, s/p 3 doses IV Vit K, steroids changed from medrol to po prednisone 60 mg daily. MR confirms cirrhosis, no sign of HCC,  also has varices c/w portal HTN. DC planning on 10/5. cleared by hepatology    - She only takes Synthroid and Atorvastatin at home, denies over the counter medications, herbal remedies, or dietary supplements    - MRCP 9/25/2021 reveals cirrhosis.  trending daily LFTs  - Liver biopsy performed 9/28/2021.  viral hepatitis panel is nonreactive.  +FARIDEH 1:640, +ASMA 1:40, and negative AMA.  -as per hepatology: assess for autoimmune etiology with anti-LKM [liver kidney microsomal antibody], anti-sLA [soluble liver antigen antibody], and quantitative immunoglobulins [IgG, IgA, IgM], send TPMT enzyme activity  - d/w in detail w Dr. TRUJILLO and Dr. Cox                        
60 year old female with history of COPD, chronic back pain, and hypothyroidism who presents with jaundice.  Hepatology consulted for elevated liver chemistries and liver biopsy concerning for autoimmune hepatitis.    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  She also endorsed some "brain fog."  She only takes Synthroid and Atorvastatin at home, and denies over the counter medications, herbal remedies, or dietary supplements  MRCP 9/25/2021 reveals cirrhosis.  Liver chemistries with peak elevations of ALT 1500, AST 2300, TB 14.6, .  Liver biopsy performed 9/28/2021.  HAV IgM, HBsAg, HBcAb, and HCV Ab nonreactive. Hep C RNA neg, +FARIDEH 1:640, +ASMA 1:40, and negative AMA.  Negative acetaminophen level.  IgG within normal limits 1511, IgA elevated at 786, IgM within normal limits at 111.  -liver biopsy consistent with DILI vs autoimmune hepatitis vs viral hepatitis; no evidence of chronic liver disease    Recommendations:  - transition to prednisone 60mg daily starting tomorrow  -trend CBC, CMP, INR, ammonia, lactate, fibrinogen daily  -please send autoimmune serologies with anti-LKM [liver kidney microsomal antibody], anti-sLA [soluble liver antigen antibody]  -f/u HBV PCR  -f/u TPMT enzyme activity  - if continues to improve, can plan for d/c early this week and f/u with Dr Serra outpatient on 10/8 (will be set up for patient)        Ping Larry PGY-5  Gastroenterology Fellow  Pager #[phone] ([facility]) or [phone] (NS)  Available on [employer] Teams.  Please contact on-call GI fellow via answering service ([phone]) after 5pm and before 8am, and on weekends.           
61 yo woman with c/o painless jaundice, has chronic LBP  work up c/w hepatocellular dz with acute hepatitis of unclear etiology  ct A/P c/w cirrhosis, but nl platelets though elevated PT  mrcp done, confirm cirrhosis, no billiary dz  MRI LS spine c/w DJD  cont  on Neuronitn , pain improved  FARIDEH is positive, has nausea, poor appetite, will plan for liver Biopsy and place on budenoside after as per GI  lactulose prn constipation
61 yo woman with c/o painless jaundice, has chronic LBP  work up c/w hepatocellular dz with acute hepatitis of unclear etiology  ct A/P c/w cirrhosis, but nl platelets though elevated PT  mrcp done, confirm cirrhosis, no billiary dz  will also MRI LS spine  place on Neuronitn   lactulose for constipation
acute hepatitis, unclear etiology  lfts > 1000 typically are from acute viral, drug or shock  d/w dr. eileen franklin is in ddx  maily hepatocellular, bili only 6x uln  recommend conservative magnemnt  serologies  mri  plts normal against cirrhosis  await inr to evaluate synthetic function  strange affect  cerulplamsin
acute hepatitis.  likely aih by score  no drug culprit  inr down with vit k.  will continue   appreciate hepatology, lfts trending down
conservative mangemnt  no acute gi complaints  improvement in all lfts and inr
elevated cassandra, likely autoimmune, will need liver bx  likely will need budesonide, but better to start after biopsy obtained
lfts elevated.   called by daughters wife last night at 11pm to discuss case  on emergency office line also that pt with palpitaitons  explained needed hippa and asked to call in am at office during normal hours  explained by her that pt with palpitations from steroids and explained she needed  to speak with hopsial.  I called nurse and asked for np to evaluate. he stated she was stable    pt now stable.  complaing of low bp.  still with strange affect. i will speak with pathology about   bx results.  steroids started.  if side effects can start budesonide 9 instead, although i think predinsoe more appropriate.  can get hepatology evaluation. igg pending.
60 year old female with history of COPD, chronic back pain, and hypothyroidism who presents with jaundice.      -Concern for autoimmune hepatitis vs DILI. severe hepatitis on pathology,  hepatology on board, PT/INR rising, steroids switched to IV Medrol on 9/30, will need close trending of LFTs, Ammonia PT/INR,   - Cirrhosis  - She is currently AAO x 3 without asterixis but she needs to be monitored closely for encephalopathy  - 10/1 started on ursodiol and Acetylcysteine, cont medrol IV, given 1/3 doses IV VIT K 10 mg as per GI,   - 10/02:  LFTs improving, NAC stopped, ongoing Medrol 60 mg daily, s/p 2/3 doses IV Vit K, PT/INR improving as well  - 10/3: symptoms are improving, starting to eat, LFTs improving, AMMONIA rising, will give lactulose today and rpt in am  - 10/4:  LFTs improving, Ammonia is same, PT/INR have normalized, s/p 2 doses IV Vit K, steroids changed from medrol to po prdnisone 60 mg daily. MR confirms cirrhosis, no sign of HCC,  also has varices c/w portal HTN. DC planning when cleared by hepatology    - She only takes Synthroid and Atorvastatin at home, denies over the counter medications, herbal remedies, or dietary supplements    - MRCP 9/25/2021 reveals cirrhosis.  trending daily LFTs  - Liver biopsy performed 9/28/2021.  viral hepatitis panel is nonreactive.  +FARIDEH 1:640, +ASMA 1:40, and negative AMA.  -as per hepatology: assess for autoimmune etiology with anti-LKM [liver kidney microsomal antibody], anti-sLA [soluble liver antigen antibody], and quantitative immunoglobulins [IgG, IgA, IgM], send TPMT enzyme activity  - d/w in detail w Dr. TRUJILLO and Dr. Cox                        
60 year old female with history of COPD, chronic back pain, and hypothyroidism who presents with jaundice.      -Concern for autoimmune hepatitis vs DILI. severe hepatitis on pathology,  hepatology on board, PT/INR rising, steroids switched to IV Medrol on 9/30, will need close trending of LFTs, Ammonia PT/INR,   - Cirrhosis  - She is currently AAO x 3 without asterixis but she needs to be monitored closely for encephalopathy  - 10/1 started on ursodiol and Acetylcysteine, cont medrol IV, given 1/3 doses IV VIT K 10 mg as per GI,   - 10/02:  LFTs improving, NAC stopped, ongoing Medrol 60 mg daily, s/p 2/3 doses IV Vit K, PT/INR improving as well  - She only takes Synthroid and Atorvastatin at home, denies over the counter medications, herbal remedies, or dietary supplements    - MRCP 9/25/2021 reveals cirrhosis.  trending daily LFTs  - Liver biopsy performed 9/28/2021.  viral hepatitis panel is nonreactive.  +FARIDEH 1:640, +ASMA 1:40, and negative AMA.  -as per hepatology: assess for autoimmune etiology with anti-LKM [liver kidney microsomal antibody], anti-sLA [soluble liver antigen antibody], and quantitative immunoglobulins [IgG, IgA, IgM], send TPMT enzyme activity  - d/w in detail w Dr. TRUJILLO and Dr. Cox                        
60 year old female with history of COPD, chronic back pain, and hypothyroidism who presents with jaundice.  Hepatology consulted for elevated liver chemistries and liver biopsy concerning for autoimmune hepatitis.    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  She also endorsed some "brain fog."  She only takes Synthroid and Atorvastatin at home, and denies over the counter medications, herbal remedies, or dietary supplements  MRCP 9/25/2021 reveals cirrhosis.  Liver chemistries with peak elevations of ALT 1500, AST 2300, TB 14.6, .  Liver biopsy performed 9/28/2021.  HAV IgM, HBsAg, HBcAb, and HCV Ab nonreactive. Hep C RNA neg, HBV PCR negative, +FARIDEH 1:640, +ASMA 1:40, negative AMA, anti-LKM negative.  Negative acetaminophen level.  IgG within normal limits 1511, IgA elevated at 786, IgM within normal limits at 111.   -liver biopsy consistent with DILI vs autoimmune hepatitis vs infectious hepatitis; no evidence of chronic liver disease    Recommendations:  -continue prednisone 60mg daily  -trend CBC, CMP, INR, ammonia, lactate, fibrinogen daily  -f/u anti-sLA [soluble liver antigen antibody]  -f/u TPMT enzyme activity  -if continues to improve, ok to discharge from hepatology standpoint with close outpatient follow up already arranged with Dr Serra on 10/8, assuming autoimmune serologies sent prior      Mejia Corona PGY-4  GI/Hepatology Fellow    MONDAY-FRIDAY 8AM-5PM:  Pager# [phone] ([facility]) or [phone] ([facility])    NON-URGENT CONSULTS:  Please email [email or web address] OR [email or web address]  AT NIGHT AND ON WEEKENDS:  Contact on-call GI fellow via answering service ([phone]) from 5pm-8am and on weekends/holidays            
aih, lfts tredning down  no syntehtic fun tion issue  oral prednisone as per hepatology
synthetic function inr ok  pt with no encephalophaty  await autoimmune markers  acute hepatitis normal  may need liver biopsy if normal  consernvative gi magnemnt  pt with strnage affect
60 year old female with history of COPD, chronic back pain, and hypothyroidism who presents with jaundice.      -Concern for autoimmune hepatitis vs DILI. severe hepatitis on pathology,  hepatology on board, PT/INR rising, steroids switched to IV Medrol on 9/30, will need close trending of LFTs, Ammonia PT/INR,   - Cirrhosis  - She is currently AAO x 3 without asterixis but she needs to be monitored closely for encephalopathy  - 10/1 started on ursodiol and Acetylcysteine, cont medrol IV, given 1/3 doses IV VIT K 10 mg as per GI,   - She only takes Synthroid and Atorvastatin at home, denies over the counter medications, herbal remedies, or dietary supplements    - MRCP 9/25/2021 reveals cirrhosis.  trending daily LFTs  - Liver biopsy performed 9/28/2021.  viral hepatitis panel is nonreactive.  +FARIDEH 1:640, +ASMA 1:40, and negative AMA.  -as per hepatology: assess for autoimmune etiology with anti-LKM [liver kidney microsomal antibody], anti-sLA [soluble liver antigen antibody], and quantitative immunoglobulins [IgG, IgA, IgM], send TPMT enzyme activity  - d/w in detail w Dr. TRUJILLO and Dr. Cox                        
60 year old female with history of COPD, chronic back pain, and hypothyroidism who presents with jaundice.  Hepatology consulted for elevated liver chemistries and liver biopsy concerning for autoimmune hepatitis.    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  She also endorsed some "brain fog."  She only takes Synthroid and Atorvastatin at home, and denies over the counter medications, herbal remedies, or dietary supplements  MRCP 9/25/2021 reveals cirrhosis.  Liver chemistries with peak elevations of ALT 1500, AST 2300, TB 14.6, .  Liver biopsy performed 9/28/2021.  HAV IgM, HBsAg, HBcAb, and HCV Ab nonreactive.  +FARIDEH 1:640, +ASMA 1:40, and negative AMA.  Negative acetaminophen level.  IgG within normal limits 1511, IgA elevated at 786, IgM within normal limits at 111.    Recommendations:  -trend CBC, CMP, and INR  -f/u final pathology report from liver biopsy, although reportedly concerning for autoimmune hepatitis vs DILI  -please sent autoimmune serologies with anti-LKM [liver kidney microsomal antibody], anti-sLA [soluble liver antigen antibody]  -f/u HBV PCR, HCV PCR  -f/u TPMT enzyme activity  -discontinued prednisone [9/29/2021 - 9/30/2021] and continue SoluMedrol 60mg IV [9/30/2021 - ]  -NAC protocol initiated [9/30/2021 - ]  -please trend INR, fibrinogen, and pH q8h  -check STAT ammonia and TTE      Mejia Corona, PGY-4  GI/Hepatology Fellow    MONDAY-FRIDAY 8AM-5PM:  Pager# [phone] ([facility]) or [phone] ([facility])    NON-URGENT CONSULTS:  Please email [email or web address] OR [email or web address]  AT NIGHT AND ON WEEKENDS:  Contact on-call GI fellow via answering service ([phone]) from 5pm-8am and on weekends/holidays
60 year old female with history of COPD, chronic back pain, and hypothyroidism who presents with jaundice.  Hepatology consulted for elevated liver chemistries and liver biopsy concerning for autoimmune hepatitis.    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  She also endorsed some "brain fog."  She only takes Synthroid and Atorvastatin at home, and denies over the counter medications, herbal remedies, or dietary supplements  MRCP 9/25/2021 reveals cirrhosis.  Liver chemistries with peak elevations of ALT 1500, AST 2300, TB 14.6, .  Liver biopsy performed 9/28/2021.  HAV IgM, HBsAg, HBcAb, and HCV Ab nonreactive. Hep C RNA neg, +FARIDEH 1:640, +ASMA 1:40, and negative AMA.  Negative acetaminophen level.  IgG within normal limits 1511, IgA elevated at 786, IgM within normal limits at 111.  -liver biopsy consistent with DILI vs autoimmune hepatitis vs viral hepatitis; no evidence of chronic liver disease    Recommendations:  -trend CBC, CMP, INR, ammonia, lactate, fibrinogen DAILY  - stop NAC  -please send autoimmune serologies with anti-LKM [liver kidney microsomal antibody], anti-sLA [soluble liver antigen antibody]  -f/u HBV PCR  -f/u TPMT enzyme activity        Ping Larry PGY-5  Gastroenterology Fellow  Pager #[phone] ([facility]) or [phone] ([facility])  Available on [employer] Teams.  Please contact on-call GI fellow via answering service ([phone]) after 5pm and before 8am, and on weekends.           
likely autoimmune hepatitis  need bx  would start budesodie 9  strange affect  risk of steroids, including but not limited to avascular necropsis discussed
other serologies sent  liver biopsy performed  can stat budesonide 9 mg , not in computer, prednisone 40 started  r/b/a discussed  will try to follow up biopsy today.  would observe.  risk of steorid including but not limited to avascular necrosis d/w pt  igg leves and total score is about 15, bx will confim dx   of autoimmune hepatitis
59 yo woman with c/o painless jaundice, has chronic LBP  work up c/w hepatocellular dz with acute hepatitis of unclear etiology  ct A/P c/w cirrhosis, but nl platelets though elevated PT  mrcp done, confirm cirrhosis, no billiary dz  MRI LS spine c/w DJD  cont  on Neuronitn , pain improved  FARIDEH is positive, has nausea, poor appetite, will plan for liver Biopsy and place on budenoside after as per GI  lactulose prn constipation
60 year old female with history of COPD, chronic back pain, and hypothyroidism who presents with jaundice.  Hepatology consulted for elevated liver chemistries and liver biopsy concerning for autoimmune hepatitis.    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  She also endorsed some "brain fog."  She only takes Synthroid and Atorvastatin at home, and denies over the counter medications, herbal remedies, or dietary supplements  MRCP 9/25/2021 reveals cirrhosis.  Liver chemistries with peak elevations of ALT 1500, AST 2300, TB 14.6, .  Liver biopsy performed 9/28/2021.  HAV IgM, HBsAg, HBcAb, and HCV Ab nonreactive. Hep C RNA neg, +FARIDEH 1:640, +ASMA 1:40, and negative AMA.  Negative acetaminophen level.  IgG within normal limits 1511, IgA elevated at 786, IgM within normal limits at 111.  -liver biopsy consistent with DILI vs autoimmune hepatitis vs infectious hepatitis; no evidence of chronic liver disease    Recommendations:  -continue prednisone 60mg daily  -trend CBC, CMP, INR, ammonia, lactate, fibrinogen daily  -please send autoimmune serologies with anti-LKM [liver kidney microsomal antibody], anti-sLA [soluble liver antigen antibody]  -f/u HBV PCR  -f/u TPMT enzyme activity  -if continues to improve, ok to discharge from hepatology standpoint with close outpatient follow up already arranged with Dr Serra on 10/8, assuming autoimmune serologies sent prior      Mejia Corona, PGY-4  GI/Hepatology Fellow    MONDAY-FRIDAY 8AM-5PM:  Pager# [phone] ([facility]) or [phone] ([facility])    NON-URGENT CONSULTS:  Please email [email or web address] OR [email or web address]  AT NIGHT AND ON WEEKENDS:  Contact on-call GI fellow via answering service ([phone]) from 5pm-8am and on weekends/holidays            
chart reviwed full consult to follow
conservative gi managemtn  no acute gi complaints  continue current appreciate hep  lfts treding down
pt with likely auto-immune hepatitis  inr from ? cholestasis  will give vitamin k iv  iv steroids per hepatology

## 2021-10-05 NOTE — DISCHARGE NOTE NURSING/CASE MANAGEMENT/SOCIAL WORK - NSDCPEEMAIL_GEN_ALL_CORE
[facility] email [email or web address]

## 2021-10-05 NOTE — DISCHARGE NOTE PROVIDER - NSDCCPCAREPLAN_GEN_ALL_CORE_FT
PRINCIPAL DISCHARGE DIAGNOSIS  Diagnosis: Autoimmune hepatitis  Assessment and Plan of Treatment: Please continue with taking yor steriods as prescribe. You has a liver biopsy consistent with DILI vs autoimmune hepatitis vs infectious hepatitis; no evidence of chronic liver disease   You have a follow up appointment with Hepatology - Dr. Serra on Friday 10/8        SECONDARY DISCHARGE DIAGNOSES  Diagnosis: Abnormal transaminases  Assessment and Plan of Treatment: Now trending down  , Please follow up with Hepatology    Diagnosis: COPD without exacerbation  Assessment and Plan of Treatment: Call your Health Care provider upon arrival home to make a follow up appointment within one week.  Take all inhalers as prescribed by your Health Care Provider.  Take steroids as prescribed by your Health Care Provider.  If your cough increases infrequency and severity and/or you have shortness of breath or increased shortness of breath call your Health Care Provider.  If you develop fever, chills, night sweats, malaise, and/or change in mental status call your Health care Provider.  Nutrition is very important.  Eat small frequent meals.  Increase your activity as tolerated.  Do not stay in bed all day      Diagnosis: Back pain  Assessment and Plan of Treatment: continue with gabapentin    Diagnosis: Hypothyroid  Assessment and Plan of Treatment: you do not make enough thyroid hormone  signs & symptoms of low levels of thyroid hormone - tired, getting cold easily, coarse or thin hair, constipation, shortness of breath, swelling, irregular periods  your doctor will do thyroid hormone blood tests at least once a year to monitor if medication dose is adequate  take your thyroid medicine as directed by your doctor & on empty stomach

## 2021-10-05 NOTE — PROGRESS NOTE ADULT - PROVIDER SPECIALTY LIST ADULT
Gastroenterology
Gastroenterology
Hepatology
Internal Medicine
Gastroenterology
Gastroenterology
Hepatology
Internal Medicine
Neurology
Gastroenterology
Hepatology
Internal Medicine
Intervent Radiology
Neurology
Neurology
Gastroenterology
Hepatology
Hepatology
Internal Medicine
Gastroenterology

## 2021-10-05 NOTE — PROGRESS NOTE ADULT - SUBJECTIVE AND OBJECTIVE BOX
Interval Events:   No acute events overnight.  Patient denies any acute symptoms at this time.    ROS:   A 12-point ROS was performed and negative except as noted in HPI.    Hospital Medications:  chlorhexidine 2% Cloths 1 Application(s) Topical daily  gabapentin 100 milliGRAM(s) Oral at bedtime  influenza   Vaccine 0.5 milliLiter(s) IntraMuscular once  lactulose Syrup 15 Gram(s) Oral three times a day  levothyroxine 25 MICROGram(s) Oral daily  polyethylene glycol 3350 17 Gram(s) Oral daily  potassium chloride    Tablet ER 40 milliEquivalent(s) Oral once  potassium chloride    Tablet ER 20 milliEquivalent(s) Oral daily  predniSONE   Tablet 60 milliGRAM(s) Oral daily  senna 2 Tablet(s) Oral at bedtime  sodium chloride 0.9%. 1000 milliLiter(s) IV Continuous <Continuous>  thiamine Injectable 100 milliGRAM(s) IV Push daily  ursodiol Tablet 500 milliGRAM(s) Oral every 12 hours      PHYSICAL EXAM:   Vital Signs:  Vital Signs Last 24 Hrs  T(C): 36.9 (05 Oct 2021 00:03), Max: 37 (04 Oct 2021 15:55)  T(F): 98.4 (05 Oct 2021 00:03), Max: 98.6 (04 Oct 2021 15:55)  HR: 55 (05 Oct 2021 00:03) (55 - 73)  BP: 104/66 (05 Oct 2021 00:03) (101/64 - 109/70)  BP(mean): --  RR: 18 (05 Oct 2021 00:03) (18 - 18)  SpO2: 95% (05 Oct 2021 00:03) (95% - 97%)  Daily     Daily     GENERAL: no acute distress  NEURO: alert  HEENT: icteric sclera, no conjunctival pallor appreciated  CHEST: no respiratory distress, no accessory muscle use  CARDIAC: regular rate, +S1/S2  ABDOMEN: soft, nondistended, nontender, no rebound or guarding  EXTREMITIES: warm, well perfused, no edema  SKIN: no lesions noted    LABS: reviewed                        13.2   11.35 )-----------( 385      ( 05 Oct 2021 06:53 )             37.8     10-05    140  |  107  |  12  ----------------------------<  84  4.3   |  22  |  0.63    Ca    8.0<L>      05 Oct 2021 06:46    TPro  6.0  /  Alb  2.9<L>  /  TBili  4.7<H>  /  DBili  x   /  AST  265<H>  /  ALT  680<H>  /  AlkPhos  240<H>  10-05    LIVER FUNCTIONS - ( 05 Oct 2021 06:46 )  Alb: 2.9 g/dL / Pro: 6.0 g/dL / ALK PHOS: 240 U/L / ALT: 680 U/L / AST: 265 U/L / GGT: x             Interval Diagnostic Studies: see sunrise for full report

## 2021-10-06 LAB
LKM AB SER-ACNC: <20.1 UNITS — SIGNIFICANT CHANGE UP (ref 0–20)
THIOPURINE METHYLTRANSFERASE (TPMT) ENZY: 21.4 — SIGNIFICANT CHANGE UP
TPMT ENZYME METHODOLOGY: SIGNIFICANT CHANGE UP
TPMT GENE PROD MET ACT IMP BLD/T-IMP: SIGNIFICANT CHANGE UP

## 2021-10-08 ENCOUNTER — LABORATORY RESULT (OUTPATIENT)
Age: 61
End: 2021-10-08

## 2021-10-08 ENCOUNTER — APPOINTMENT (OUTPATIENT)
Dept: HEPATOLOGY | Facility: CLINIC | Age: 61
End: 2021-10-08
Payer: MEDICARE

## 2021-10-08 VITALS
RESPIRATION RATE: 14 BRPM | WEIGHT: 153 LBS | TEMPERATURE: 97.9 F | HEART RATE: 67 BPM | HEIGHT: 63 IN | BODY MASS INDEX: 27.11 KG/M2 | SYSTOLIC BLOOD PRESSURE: 115 MMHG | DIASTOLIC BLOOD PRESSURE: 78 MMHG

## 2021-10-08 PROCEDURE — 99214 OFFICE O/P EST MOD 30 MIN: CPT

## 2021-10-08 PROCEDURE — 99204 OFFICE O/P NEW MOD 45 MIN: CPT

## 2021-10-08 NOTE — HISTORY OF PRESENT ILLNESS
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 She also developed acute liver failure. She was started on prednisone on 9/29/21. \par She was given solumedrol 60 mg IV daily for several days and she was suppose to go home on prednisone 60 mg daily. Her FARIDEH was highly positive and ASMA was positive 1:40. TPMT enzyme normal.\par \par Since discharge on 10/6/21, she has only been taking prednisone 20 mg daily even though she should be taking 60 mg daily. Today is, 10/8/21 she feels well however.

## 2021-10-08 NOTE — PHYSICAL EXAM
[General Appearance - Alert] : alert [General Appearance - In No Acute Distress] : in no acute distress [Outer Ear] : the ears and nose were normal in appearance [Oropharynx] : the oropharynx was normal [Neck Appearance] : the appearance of the neck was normal [Neck Cervical Mass (___cm)] : no neck mass was observed [Jugular Venous Distention Increased] : there was no jugular-venous distention [Thyroid Diffuse Enlargement] : the thyroid was not enlarged [Thyroid Nodule] : there were no palpable thyroid nodules [Auscultation Breath Sounds / Voice Sounds] : lungs were clear to auscultation bilaterally [Bowel Sounds] : normal bowel sounds [Abdomen Soft] : soft [Abdomen Tenderness] : non-tender [Abdomen Mass (___ Cm)] : no abdominal mass palpated [Cervical Lymph Nodes Enlarged Posterior Bilaterally] : posterior cervical [Cervical Lymph Nodes Enlarged Anterior Bilaterally] : anterior cervical [Supraclavicular Lymph Nodes Enlarged Bilaterally] : supraclavicular [Axillary Lymph Nodes Enlarged Bilaterally] : axillary [Femoral Lymph Nodes Enlarged Bilaterally] : femoral [Inguinal Lymph Nodes Enlarged Bilaterally] : inguinal [No CVA Tenderness] : no ~M costovertebral angle tenderness [No Spinal Tenderness] : no spinal tenderness [Skin Color & Pigmentation] : normal skin color and pigmentation [Skin Turgor] : normal skin turgor [] : no rash [Deep Tendon Reflexes (DTR)] : deep tendon reflexes were 2+ and symmetric [Sensation] : the sensory exam was normal to light touch and pinprick [No Focal Deficits] : no focal deficits [Oriented To Time, Place, And Person] : oriented to person, place, and time [Impaired Insight] : insight and judgment were intact [Affect] : the affect was normal [Scleral Icterus] : No Scleral Icterus [Spider Angioma] : No spider angioma(s) were observed [Abdominal  Ascites] : no ascites [Ascites Fluid Wave] : no ascites fluid wave [Ascites Tense] : ascites is not tense [Asterixis] : no asterixis observed [Jaundice] : No jaundice [Depression] : no depression

## 2021-10-08 NOTE — ASSESSMENT
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 cory AIH 9/2021 here for f/u.\par \par # DILI-AIH vs de cory AIH\par Check labs today\par increase prednisone back to 40 mg daily for now\par PPI while on high dose steroids\par Add azathioprine 50 mg daily (10/9- )\par Needs to get COVID vaccination\par Fibroscan in the future \par Recheck labs in 2 weeks\par \par We will continue to monitor RIC's exam and labs for disease progress closely while titrating autoimmune hepatitis immunosuppression.\par \par I have discussed the potential side effects of Imuran which includes pancreatitis, immunosuppression, risk of infections and malignancy, gi disturbances, and myalgias.\par \par RTC 1 month\par

## 2021-10-11 LAB
ALBUMIN SERPL ELPH-MCNC: 3.5 G/DL
ALP BLD-CCNC: 198 U/L
ALT SERPL-CCNC: 489 U/L
ANION GAP SERPL CALC-SCNC: 10 MMOL/L
AST SERPL-CCNC: 206 U/L
BASOPHILS # BLD AUTO: 0 K/UL
BASOPHILS NFR BLD AUTO: 0 %
BILIRUB SERPL-MCNC: 4.9 MG/DL
BUN SERPL-MCNC: 8 MG/DL
CALCIUM SERPL-MCNC: 8.6 MG/DL
CHLORIDE SERPL-SCNC: 102 MMOL/L
CO2 SERPL-SCNC: 25 MMOL/L
CREAT SERPL-MCNC: 0.57 MG/DL
EOSINOPHIL # BLD AUTO: 0.1 K/UL
EOSINOPHIL NFR BLD AUTO: 0.9 %
ESTIMATED AVERAGE GLUCOSE: 108 MG/DL
FERRITIN SERPL-MCNC: 651 NG/ML
GGT SERPL-CCNC: 128 U/L
GLUCOSE SERPL-MCNC: 113 MG/DL
HBA1C MFR BLD HPLC: 5.4 %
HBV CORE IGG+IGM SER QL: NONREACTIVE
HBV SURFACE AB SER QL: NONREACTIVE
HBV SURFACE AB SERPL IA-ACNC: <3 MIU/ML
HCT VFR BLD CALC: 40.9 %
HEPATITIS A IGG ANTIBODY: NONREACTIVE
HGB BLD-MCNC: 13.3 G/DL
INR PPP: 0.94 RATIO
LYMPHOCYTES # BLD AUTO: 1.89 K/UL
LYMPHOCYTES NFR BLD AUTO: 16.5 %
MAN DIFF?: NORMAL
MCHC RBC-ENTMCNC: 30.6 PG
MCHC RBC-ENTMCNC: 32.5 GM/DL
MCV RBC AUTO: 94.2 FL
MONOCYTES # BLD AUTO: 0.3 K/UL
MONOCYTES NFR BLD AUTO: 2.6 %
NEUTROPHILS # BLD AUTO: 8.98 K/UL
NEUTROPHILS NFR BLD AUTO: 78.3 %
PLATELET # BLD AUTO: 373 K/UL
POTASSIUM SERPL-SCNC: 4.4 MMOL/L
PROT SERPL-MCNC: 6.2 G/DL
PT BLD: 11.1 SEC
RBC # BLD: 4.34 M/UL
RBC # FLD: 21.9 %
SODIUM SERPL-SCNC: 137 MMOL/L
WBC # FLD AUTO: 11.47 K/UL

## 2021-10-12 LAB
DEPRECATED KAPPA LC FREE/LAMBDA SER: 1.04 RATIO
IGA SER QL IEP: 759 MG/DL
IGG SER QL IEP: 1223 MG/DL
IGM SER QL IEP: 114 MG/DL
KAPPA LC CSF-MCNC: 1.63 MG/DL
KAPPA LC SERPL-MCNC: 1.69 MG/DL

## 2021-10-15 ENCOUNTER — LABORATORY RESULT (OUTPATIENT)
Age: 61
End: 2021-10-15

## 2021-10-15 LAB
INR PPP: 0.96 RATIO
PT BLD: 11.4 SEC

## 2021-10-18 LAB
ALBUMIN SERPL ELPH-MCNC: 3.7 G/DL
ALP BLD-CCNC: 155 U/L
ALT SERPL-CCNC: 165 U/L
ANION GAP SERPL CALC-SCNC: 12 MMOL/L
AST SERPL-CCNC: 65 U/L
BASOPHILS # BLD AUTO: 0 K/UL
BASOPHILS NFR BLD AUTO: 0 %
BILIRUB SERPL-MCNC: 3.3 MG/DL
BUN SERPL-MCNC: 6 MG/DL
CALCIUM SERPL-MCNC: 9.2 MG/DL
CHLORIDE SERPL-SCNC: 101 MMOL/L
CO2 SERPL-SCNC: 27 MMOL/L
CREAT SERPL-MCNC: 0.65 MG/DL
EOSINOPHIL # BLD AUTO: 0 K/UL
EOSINOPHIL NFR BLD AUTO: 0 %
GGT SERPL-CCNC: 68 U/L
GLUCOSE SERPL-MCNC: 127 MG/DL
HCT VFR BLD CALC: 41.4 %
HGB BLD-MCNC: 13.8 G/DL
LYMPHOCYTES # BLD AUTO: 0.94 K/UL
LYMPHOCYTES NFR BLD AUTO: 8.7 %
MAN DIFF?: NORMAL
MCHC RBC-ENTMCNC: 31.7 PG
MCHC RBC-ENTMCNC: 33.3 GM/DL
MCV RBC AUTO: 95 FL
MONOCYTES # BLD AUTO: 0.38 K/UL
MONOCYTES NFR BLD AUTO: 3.5 %
NEUTROPHILS # BLD AUTO: 9.49 K/UL
NEUTROPHILS NFR BLD AUTO: 87.8 %
PLATELET # BLD AUTO: 352 K/UL
POTASSIUM SERPL-SCNC: 4.5 MMOL/L
PROT SERPL-MCNC: 6.5 G/DL
RBC # BLD: 4.36 M/UL
RBC # FLD: 20.3 %
SODIUM SERPL-SCNC: 139 MMOL/L
WBC # FLD AUTO: 10.81 K/UL

## 2021-10-22 ENCOUNTER — NON-APPOINTMENT (OUTPATIENT)
Age: 61
End: 2021-10-22

## 2021-10-22 LAB
ALBUMIN SERPL ELPH-MCNC: 3.9 G/DL
ALP BLD-CCNC: 121 U/L
ALT SERPL-CCNC: 64 U/L
ANION GAP SERPL CALC-SCNC: 13 MMOL/L
AST SERPL-CCNC: 39 U/L
BASOPHILS # BLD AUTO: 0.04 K/UL
BASOPHILS NFR BLD AUTO: 0.4 %
BILIRUB SERPL-MCNC: 2.3 MG/DL
BUN SERPL-MCNC: 7 MG/DL
CALCIUM SERPL-MCNC: 9 MG/DL
CHLORIDE SERPL-SCNC: 100 MMOL/L
CO2 SERPL-SCNC: 25 MMOL/L
CREAT SERPL-MCNC: 0.67 MG/DL
EOSINOPHIL # BLD AUTO: 0.02 K/UL
EOSINOPHIL NFR BLD AUTO: 0.2 %
GGT SERPL-CCNC: 52 U/L
GLUCOSE SERPL-MCNC: 182 MG/DL
HCT VFR BLD CALC: 40.8 %
HGB BLD-MCNC: 13.2 G/DL
IMM GRANULOCYTES NFR BLD AUTO: 0.8 %
INR PPP: 0.96 RATIO
LYMPHOCYTES # BLD AUTO: 0.86 K/UL
LYMPHOCYTES NFR BLD AUTO: 9.6 %
MAN DIFF?: NORMAL
MCHC RBC-ENTMCNC: 31.7 PG
MCHC RBC-ENTMCNC: 32.4 GM/DL
MCV RBC AUTO: 97.8 FL
MONOCYTES # BLD AUTO: 0.23 K/UL
MONOCYTES NFR BLD AUTO: 2.6 %
NEUTROPHILS # BLD AUTO: 7.78 K/UL
NEUTROPHILS NFR BLD AUTO: 86.4 %
PLATELET # BLD AUTO: 234 K/UL
POTASSIUM SERPL-SCNC: 3.9 MMOL/L
PROT SERPL-MCNC: 6.2 G/DL
PT BLD: 11.4 SEC
RBC # BLD: 4.17 M/UL
RBC # FLD: 19.6 %
SODIUM SERPL-SCNC: 138 MMOL/L
WBC # FLD AUTO: 9 K/UL

## 2021-10-25 LAB
DEPRECATED KAPPA LC FREE/LAMBDA SER: 1.08 RATIO
IGA SER QL IEP: 513 MG/DL
IGG SER QL IEP: 881 MG/DL
IGM SER QL IEP: 96 MG/DL
KAPPA LC CSF-MCNC: 1.6 MG/DL
KAPPA LC SERPL-MCNC: 1.72 MG/DL

## 2021-10-26 PROCEDURE — 85384 FIBRINOGEN ACTIVITY: CPT

## 2021-10-26 PROCEDURE — 97161 PT EVAL LOW COMPLEX 20 MIN: CPT

## 2021-10-26 PROCEDURE — U0003: CPT

## 2021-10-26 PROCEDURE — 84433 ASY THIOPURIN S-MTHYLTRNSFRS: CPT

## 2021-10-26 PROCEDURE — 80074 ACUTE HEPATITIS PANEL: CPT

## 2021-10-26 PROCEDURE — 82390 ASSAY OF CERULOPLASMIN: CPT

## 2021-10-26 PROCEDURE — 82247 BILIRUBIN TOTAL: CPT

## 2021-10-26 PROCEDURE — 86900 BLOOD TYPING SEROLOGIC ABO: CPT

## 2021-10-26 PROCEDURE — 93306 TTE W/DOPPLER COMPLETE: CPT

## 2021-10-26 PROCEDURE — 74176 CT ABD & PELVIS W/O CONTRAST: CPT | Mod: MA

## 2021-10-26 PROCEDURE — 82784 ASSAY IGA/IGD/IGG/IGM EACH: CPT

## 2021-10-26 PROCEDURE — 36415 COLL VENOUS BLD VENIPUNCTURE: CPT

## 2021-10-26 PROCEDURE — 36000 PLACE NEEDLE IN VEIN: CPT

## 2021-10-26 PROCEDURE — 87522 HEPATITIS C REVRS TRNSCRPJ: CPT

## 2021-10-26 PROCEDURE — 87389 HIV-1 AG W/HIV-1&-2 AB AG IA: CPT

## 2021-10-26 PROCEDURE — 76942 ECHO GUIDE FOR BIOPSY: CPT

## 2021-10-26 PROCEDURE — 86038 ANTINUCLEAR ANTIBODIES: CPT

## 2021-10-26 PROCEDURE — 85027 COMPLETE CBC AUTOMATED: CPT

## 2021-10-26 PROCEDURE — 87517 HEPATITIS B DNA QUANT: CPT

## 2021-10-26 PROCEDURE — 99285 EMERGENCY DEPT VISIT HI MDM: CPT | Mod: 25

## 2021-10-26 PROCEDURE — 82803 BLOOD GASES ANY COMBINATION: CPT

## 2021-10-26 PROCEDURE — 74181 MRI ABDOMEN W/O CONTRAST: CPT

## 2021-10-26 PROCEDURE — 83605 ASSAY OF LACTIC ACID: CPT

## 2021-10-26 PROCEDURE — 47000 NEEDLE BIOPSY OF LIVER PERQ: CPT

## 2021-10-26 PROCEDURE — 85610 PROTHROMBIN TIME: CPT

## 2021-10-26 PROCEDURE — 82150 ASSAY OF AMYLASE: CPT

## 2021-10-26 PROCEDURE — 85730 THROMBOPLASTIN TIME PARTIAL: CPT

## 2021-10-26 PROCEDURE — A9585: CPT

## 2021-10-26 PROCEDURE — U0005: CPT

## 2021-10-26 PROCEDURE — 82728 ASSAY OF FERRITIN: CPT

## 2021-10-26 PROCEDURE — 80307 DRUG TEST PRSMV CHEM ANLYZR: CPT

## 2021-10-26 PROCEDURE — 81001 URINALYSIS AUTO W/SCOPE: CPT

## 2021-10-26 PROCEDURE — 85025 COMPLETE CBC W/AUTO DIFF WBC: CPT

## 2021-10-26 PROCEDURE — 88307 TISSUE EXAM BY PATHOLOGIST: CPT

## 2021-10-26 PROCEDURE — 86850 RBC ANTIBODY SCREEN: CPT

## 2021-10-26 PROCEDURE — 82140 ASSAY OF AMMONIA: CPT

## 2021-10-26 PROCEDURE — 86381 MITOCHONDRIAL ANTIBODY EACH: CPT

## 2021-10-26 PROCEDURE — 83520 IMMUNOASSAY QUANT NOS NONAB: CPT

## 2021-10-26 PROCEDURE — 87086 URINE CULTURE/COLONY COUNT: CPT

## 2021-10-26 PROCEDURE — 86255 FLUORESCENT ANTIBODY SCREEN: CPT

## 2021-10-26 PROCEDURE — 74183 MRI ABD W/O CNTR FLWD CNTR: CPT

## 2021-10-26 PROCEDURE — 80076 HEPATIC FUNCTION PANEL: CPT

## 2021-10-26 PROCEDURE — 80048 BASIC METABOLIC PNL TOTAL CA: CPT

## 2021-10-26 PROCEDURE — 80053 COMPREHEN METABOLIC PANEL: CPT

## 2021-10-26 PROCEDURE — 88313 SPECIAL STAINS GROUP 2: CPT

## 2021-10-26 PROCEDURE — 86803 HEPATITIS C AB TEST: CPT

## 2021-10-26 PROCEDURE — 86901 BLOOD TYPING SEROLOGIC RH(D): CPT

## 2021-10-26 PROCEDURE — 83690 ASSAY OF LIPASE: CPT

## 2021-10-26 PROCEDURE — 82248 BILIRUBIN DIRECT: CPT

## 2021-10-26 PROCEDURE — 72148 MRI LUMBAR SPINE W/O DYE: CPT

## 2021-10-26 PROCEDURE — 86376 MICROSOMAL ANTIBODY EACH: CPT

## 2021-10-26 PROCEDURE — 86769 SARS-COV-2 COVID-19 ANTIBODY: CPT

## 2021-11-12 ENCOUNTER — APPOINTMENT (OUTPATIENT)
Dept: HEPATOLOGY | Facility: CLINIC | Age: 61
End: 2021-11-12
Payer: MEDICARE

## 2021-11-12 VITALS
WEIGHT: 150 LBS | TEMPERATURE: 98.1 F | RESPIRATION RATE: 14 BRPM | HEART RATE: 77 BPM | DIASTOLIC BLOOD PRESSURE: 84 MMHG | BODY MASS INDEX: 26.58 KG/M2 | OXYGEN SATURATION: 97 % | HEIGHT: 63 IN | SYSTOLIC BLOOD PRESSURE: 130 MMHG

## 2021-11-12 PROCEDURE — 99214 OFFICE O/P EST MOD 30 MIN: CPT

## 2021-11-12 RX ORDER — URSODIOL 500 MG/1
500 TABLET ORAL TWICE DAILY
Qty: 180 | Refills: 3 | Status: DISCONTINUED | COMMUNITY
End: 2021-11-12

## 2021-11-12 NOTE — HISTORY OF PRESENT ILLNESS
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 She also developed acute liver failure. She was started on prednisone on 9/29/21. \par She was given solumedrol 60 mg IV daily for several days and she was suppose to go home on prednisone 60 mg daily. Her FARIDEH was highly positive and ASMA was positive 1:40. TPMT enzyme normal.\par \par Since discharge on 10/6/21, she has only been taking prednisone 20 mg daily even though she should be taking 60 mg daily. Today is, 10/8/21 she feels well however. \par \par 11/12/21 visit - she is down to prednisone 20 mg daily as of 10/22/21. doing welll and feels a lot of energy. she is on imuran and ursodiol.

## 2021-11-12 NOTE — ASSESSMENT
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 Liver enzymes improving. Doing well now\par Decrease prednisone from 20 mg daily to 15 mg daily\par PPI while on high dose steroids\par Added azathioprine 50 mg daily (10/9- )\par Fibroscan in the future \par Recheck labs in 1 month\par \par We will continue to monitor RIC's exam and labs for disease progress closely while titrating autoimmune hepatitis immunosuppression.\par \par I have discussed the potential side effects of Imuran which includes pancreatitis, immunosuppression, risk of infections and malignancy, gi disturbances, and myalgias.\par \par RTC 2 months\par Labs today\par

## 2021-11-15 ENCOUNTER — NON-APPOINTMENT (OUTPATIENT)
Age: 61
End: 2021-11-15

## 2021-11-15 LAB
ALBUMIN SERPL ELPH-MCNC: 4.3 G/DL
ALP BLD-CCNC: 91 U/L
ALT SERPL-CCNC: 37 U/L
ANION GAP SERPL CALC-SCNC: 13 MMOL/L
AST SERPL-CCNC: 27 U/L
BASOPHILS # BLD AUTO: 0.04 K/UL
BASOPHILS NFR BLD AUTO: 0.4 %
BILIRUB SERPL-MCNC: 1.2 MG/DL
BUN SERPL-MCNC: 4 MG/DL
CALCIUM SERPL-MCNC: 9.5 MG/DL
CHLORIDE SERPL-SCNC: 99 MMOL/L
CHOLEST SERPL-MCNC: 256 MG/DL
CO2 SERPL-SCNC: 27 MMOL/L
COVID-19 SPIKE DOMAIN ANTIBODY INTERPRETATION: NEGATIVE
CREAT SERPL-MCNC: 0.62 MG/DL
EOSINOPHIL # BLD AUTO: 0 K/UL
EOSINOPHIL NFR BLD AUTO: 0 %
ESTIMATED AVERAGE GLUCOSE: 111 MG/DL
GGT SERPL-CCNC: 29 U/L
GLUCOSE SERPL-MCNC: 101 MG/DL
HBA1C MFR BLD HPLC: 5.5 %
HCT VFR BLD CALC: 43.4 %
HDLC SERPL-MCNC: 85 MG/DL
HGB BLD-MCNC: 14.5 G/DL
IMM GRANULOCYTES NFR BLD AUTO: 0.7 %
INR PPP: 1.02 RATIO
LDLC SERPL CALC-MCNC: 155 MG/DL
LYMPHOCYTES # BLD AUTO: 1.29 K/UL
LYMPHOCYTES NFR BLD AUTO: 14.1 %
MAN DIFF?: NORMAL
MCHC RBC-ENTMCNC: 33.1 PG
MCHC RBC-ENTMCNC: 33.4 GM/DL
MCV RBC AUTO: 99.1 FL
MONOCYTES # BLD AUTO: 0.36 K/UL
MONOCYTES NFR BLD AUTO: 3.9 %
NEUTROPHILS # BLD AUTO: 7.37 K/UL
NEUTROPHILS NFR BLD AUTO: 80.9 %
NONHDLC SERPL-MCNC: 171 MG/DL
PLATELET # BLD AUTO: 326 K/UL
POTASSIUM SERPL-SCNC: 4.4 MMOL/L
PROT SERPL-MCNC: 6.7 G/DL
PT BLD: 12 SEC
RBC # BLD: 4.38 M/UL
RBC # FLD: 17.4 %
SARS-COV-2 AB SERPL IA-ACNC: 0.4 U/ML
SODIUM SERPL-SCNC: 139 MMOL/L
TRIGL SERPL-MCNC: 80 MG/DL
WBC # FLD AUTO: 9.12 K/UL

## 2021-11-17 LAB
6-MMPN: 194
6-TGN: 210

## 2021-12-08 ENCOUNTER — APPOINTMENT (OUTPATIENT)
Dept: OBGYN | Facility: CLINIC | Age: 61
End: 2021-12-08
Payer: MEDICARE

## 2021-12-08 VITALS
HEART RATE: 96 BPM | DIASTOLIC BLOOD PRESSURE: 98 MMHG | WEIGHT: 156 LBS | OXYGEN SATURATION: 96 % | SYSTOLIC BLOOD PRESSURE: 145 MMHG | BODY MASS INDEX: 27.63 KG/M2

## 2021-12-08 DIAGNOSIS — Z01.419 ENCOUNTER FOR GYNECOLOGICAL EXAMINATION (GENERAL) (ROUTINE) W/OUT ABNORMAL FINDINGS: ICD-10-CM

## 2021-12-08 PROCEDURE — 99386 PREV VISIT NEW AGE 40-64: CPT | Mod: GY

## 2021-12-08 PROCEDURE — G0101: CPT

## 2021-12-08 NOTE — HISTORY OF PRESENT ILLNESS
[FreeTextEntry1] : Patient is a 61 year old female who presents for her annual exam.  Reports that she has intermittent abdominal and pelvic pain and would like a sonogram to check and make sure that everything is ok.  Denies vaginal bleeding, change in discharge, change in bowel or bladder habits or any other concerns.  Due for pap, up to date with mammo.

## 2021-12-10 ENCOUNTER — NON-APPOINTMENT (OUTPATIENT)
Age: 61
End: 2021-12-10

## 2021-12-10 LAB
ALBUMIN SERPL ELPH-MCNC: 3.9 G/DL
ALP BLD-CCNC: 85 U/L
ALT SERPL-CCNC: 40 U/L
ANION GAP SERPL CALC-SCNC: 9 MMOL/L
AST SERPL-CCNC: 32 U/L
BASOPHILS # BLD AUTO: 0.14 K/UL
BASOPHILS NFR BLD AUTO: 1.2 %
BILIRUB SERPL-MCNC: 0.5 MG/DL
BUN SERPL-MCNC: 6 MG/DL
CALCIUM SERPL-MCNC: 9.1 MG/DL
CHLORIDE SERPL-SCNC: 101 MMOL/L
CO2 SERPL-SCNC: 29 MMOL/L
CREAT SERPL-MCNC: 0.71 MG/DL
EOSINOPHIL # BLD AUTO: 0.14 K/UL
EOSINOPHIL NFR BLD AUTO: 1.2 %
GGT SERPL-CCNC: 33 U/L
GLUCOSE SERPL-MCNC: 166 MG/DL
HCT VFR BLD CALC: 44.1 %
HGB BLD-MCNC: 14.4 G/DL
IMM GRANULOCYTES NFR BLD AUTO: 0.7 %
INR PPP: 0.99 RATIO
LYMPHOCYTES # BLD AUTO: 1.14 K/UL
LYMPHOCYTES NFR BLD AUTO: 9.8 %
MAN DIFF?: NORMAL
MCHC RBC-ENTMCNC: 32.7 GM/DL
MCHC RBC-ENTMCNC: 32.9 PG
MCV RBC AUTO: 100.7 FL
MONOCYTES # BLD AUTO: 0.45 K/UL
MONOCYTES NFR BLD AUTO: 3.9 %
NEUTROPHILS # BLD AUTO: 9.63 K/UL
NEUTROPHILS NFR BLD AUTO: 83.2 %
PLATELET # BLD AUTO: 298 K/UL
POTASSIUM SERPL-SCNC: 4.2 MMOL/L
PROT SERPL-MCNC: 6.5 G/DL
PT BLD: 11.7 SEC
RBC # BLD: 4.38 M/UL
RBC # FLD: 16.1 %
SODIUM SERPL-SCNC: 139 MMOL/L
WBC # FLD AUTO: 11.58 K/UL

## 2021-12-13 LAB
DEPRECATED KAPPA LC FREE/LAMBDA SER: 1.56 RATIO
IGA SER QL IEP: 460 MG/DL
IGG SER QL IEP: 842 MG/DL
IGM SER QL IEP: 89 MG/DL
KAPPA LC CSF-MCNC: 1.75 MG/DL
KAPPA LC SERPL-MCNC: 2.73 MG/DL

## 2021-12-14 LAB — CYTOLOGY CVX/VAG DOC THIN PREP: NORMAL

## 2021-12-21 ENCOUNTER — APPOINTMENT (OUTPATIENT)
Dept: UROLOGY | Facility: CLINIC | Age: 61
End: 2021-12-21
Payer: MEDICARE

## 2021-12-21 PROCEDURE — 99406 BEHAV CHNG SMOKING 3-10 MIN: CPT

## 2021-12-21 PROCEDURE — 99204 OFFICE O/P NEW MOD 45 MIN: CPT

## 2021-12-21 RX ORDER — LEVOTHYROXINE SODIUM 0.03 MG/1
25 TABLET ORAL
Refills: 0 | Status: ACTIVE | COMMUNITY

## 2021-12-21 NOTE — HISTORY OF PRESENT ILLNESS
[FreeTextEntry1] : Very pleasant 61-year-old woman who presents for evaluation of urinary urgency and abdominal pain.  She reports 3 pregnancies in the past.  She reports that she previously had a "tilted bladder".  She reports urinary urgency with occasional urge incontinence.  This is bothersome to her.  She was recently diagnosed with cirrhosis and liver failure and has been tapering off of prednisone since that time.  Recent liver function found to be normal.  She denies dysuria.  No stress incontinence.  No hematuria.  She smokes cigarettes on a daily basis.  Patient is very concerned about the possibility of bladder cancer

## 2021-12-21 NOTE — REVIEW OF SYSTEMS
[Bladder pressure] : experiences bladder pressure [Strain or push to urinate] : strain or push to urinate [Wait a long time to urinate] : waits a long time to urinate [Slow urine stream] : slow urine stream [Leakage of urine with urgency] : leakage of urine with urgency [Negative] : Heme/Lymph [FreeTextEntry6] : Pain on lower abdomen area and lower back / bladder not completely emptying

## 2021-12-21 NOTE — ASSESSMENT
[FreeTextEntry1] : Very pleasant 61-year-old woman who presents for evaluation of urinary urgency, bilateral lower abdominal pain\par -Patient is very concerned about bladder cancer.  We discussed the risks of developing bladder cancer.  We discussed risk factors, including smoking which significantly contribute to bladder cancer\par -Patient was counseled on smoking cessation for 5 minutes.  We discussed various health benefits of quitting.  We discussed the potential Urologic implications of smoking, including an increased risk of bladder and upper tract urothelial carcinoma as well as kidney cancer.\par -Urinalysis\par -Urine culture\par -Urine cytology\par -We discussed options for treatment of urinary urgency, including medications, Botox, SNS, PTNS\par -Given recent episode of liver failure we will defer treatment with medications that are cleared via a hepatic route at this time\par -Cystoscopy

## 2021-12-22 LAB
APPEARANCE: CLEAR
BACTERIA: NEGATIVE
BILIRUBIN URINE: NEGATIVE
BLOOD URINE: NEGATIVE
COLOR: NORMAL
GLUCOSE QUALITATIVE U: NEGATIVE
HYALINE CASTS: 0 /LPF
KETONES URINE: NEGATIVE
LEUKOCYTE ESTERASE URINE: NEGATIVE
MICROSCOPIC-UA: NORMAL
NITRITE URINE: NEGATIVE
PH URINE: 6.5
PROTEIN URINE: NEGATIVE
RED BLOOD CELLS URINE: 0 /HPF
SPECIFIC GRAVITY URINE: 1
SQUAMOUS EPITHELIAL CELLS: 1 /HPF
UROBILINOGEN URINE: NORMAL
WHITE BLOOD CELLS URINE: 0 /HPF

## 2021-12-23 LAB — BACTERIA UR CULT: NORMAL

## 2021-12-24 LAB — URINE CYTOLOGY: NORMAL

## 2022-01-04 ENCOUNTER — APPOINTMENT (OUTPATIENT)
Dept: UROLOGY | Facility: CLINIC | Age: 62
End: 2022-01-04
Payer: MEDICARE

## 2022-01-04 ENCOUNTER — APPOINTMENT (OUTPATIENT)
Dept: UROLOGY | Facility: CLINIC | Age: 62
End: 2022-01-04

## 2022-01-04 DIAGNOSIS — R39.15 URGENCY OF URINATION: ICD-10-CM

## 2022-01-04 DIAGNOSIS — R10.9 UNSPECIFIED ABDOMINAL PAIN: ICD-10-CM

## 2022-01-04 DIAGNOSIS — F17.210 NICOTINE DEPENDENCE, CIGARETTES, UNCOMPLICATED: ICD-10-CM

## 2022-01-04 PROCEDURE — 52000 CYSTOURETHROSCOPY: CPT

## 2022-01-04 PROCEDURE — 99213 OFFICE O/P EST LOW 20 MIN: CPT | Mod: 25

## 2022-01-04 NOTE — ASSESSMENT
[FreeTextEntry1] : Very pleasant 61-year-old woman who presents for follow-up of urinary urgency, right lower quadrant abdominal pain, concern for bladder cancer\par -Cystoscopy reviewed with the patient today demonstrating no evidence of urothelial lesions\par -Urine cytology negative\par -Urine culture negative\par -Urinalysis demonstrates 0 red blood cells per high-power field\par -We discussed that her risk for bladder cancer is very low given no evidence of urothelial lesions, negative cytology, 0 red blood cells per high-power field on urinalysis\par -We discussed options for management of urinary urgency and she reports at this time she wishes to defer medications due to recent episode of autoimmune hepatitis\par -Follow-up in 6 months at which time we will again discuss options for treatment of urinary urgency

## 2022-01-04 NOTE — HISTORY OF PRESENT ILLNESS
[FreeTextEntry1] : Very pleasant 61-year-old woman who presents for follow-up of urinary urgency, concern for bladder cancer, abdominal pain.  She underwent a cystoscopy today which demonstrated no urothelial lesions.  Additionally urinalysis demonstrated 0 red blood cells per high-power field.  Urine culture negative.  Urine cytology negative.  She reports persistent mild right lower quadrant abdominal pain.  She reports persistent urinary urgency.  Because of concern for hepatic toxicity and recent liver failure she wishes to defer medication options for urinary urgency at this time.

## 2022-01-14 ENCOUNTER — RESULT REVIEW (OUTPATIENT)
Age: 62
End: 2022-01-14

## 2022-01-14 ENCOUNTER — APPOINTMENT (OUTPATIENT)
Dept: HEPATOLOGY | Facility: CLINIC | Age: 62
End: 2022-01-14
Payer: MEDICARE

## 2022-01-14 VITALS
HEART RATE: 76 BPM | BODY MASS INDEX: 27.46 KG/M2 | WEIGHT: 155 LBS | DIASTOLIC BLOOD PRESSURE: 78 MMHG | TEMPERATURE: 98.1 F | SYSTOLIC BLOOD PRESSURE: 131 MMHG | RESPIRATION RATE: 14 BRPM | OXYGEN SATURATION: 97 % | HEIGHT: 63 IN

## 2022-01-14 DIAGNOSIS — R10.31 RIGHT LOWER QUADRANT PAIN: ICD-10-CM

## 2022-01-14 PROCEDURE — 99214 OFFICE O/P EST MOD 30 MIN: CPT

## 2022-01-14 NOTE — ASSESSMENT
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 Liver enzymes improving. Doing well now\par Decrease prednisone from 20 mg daily to 15 mg daily\par PPI while on high dose steroids\par Added azathioprine 50 mg daily (10/9- )\par Fibroscan next visit\par Recheck labs in 1 month\par \par # Lower abdominal pain\par CT abd/pelvis ordered w/ contrast\par DEXA ordered\par ? abdominal vs back vs spine related due to vagueness of symptoms\par \par # HLD\par Increasing LDL\par Would not recommend further statin therapy\par Consider newer agents\par Dr. Yvan Bryson is her cardiologist\par \par We will continue to monitor RIC's exam and labs for disease progress closely while titrating autoimmune hepatitis immunosuppression.\par \par I have discussed the potential side effects of Imuran which includes pancreatitis, immunosuppression, risk of infections and malignancy, gi disturbances, and myalgias.\par \par RTC 2 months\par Labs today\par

## 2022-01-14 NOTE — HISTORY OF PRESENT ILLNESS
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 She also developed acute liver failure. She was started on prednisone on 9/29/21. \par She was given solumedrol 60 mg IV daily for several days and she was suppose to go home on prednisone 60 mg daily. Her FARIDEH was highly positive and ASMA was positive 1:40. TPMT enzyme normal.\par \par Since discharge on 10/6/21, she has only been taking prednisone 20 mg daily even though she should be taking 60 mg daily. Today is, 10/8/21 she feels well however. \par \par 11/12/21 visit - she is down to prednisone 20 mg daily as of 10/22/21. doing well and feels a lot of energy. she is on imuran and ursodiol.\par \par 1/14/22 visit - prednisone decreased to 5 mg daily on 12/10/21. Labs 12/20/21 looked OK. tolerating imuran 50 mg daily. saw cardiology. Lower back vs abdominal pain radiating to pelvis which is new.

## 2022-01-18 LAB
ALBUMIN SERPL ELPH-MCNC: 4 G/DL
ALP BLD-CCNC: 90 U/L
ALT SERPL-CCNC: 34 U/L
ANION GAP SERPL CALC-SCNC: 15 MMOL/L
AST SERPL-CCNC: 36 U/L
BASOPHILS # BLD AUTO: 0.06 K/UL
BASOPHILS NFR BLD AUTO: 0.8 %
BILIRUB SERPL-MCNC: 0.5 MG/DL
BUN SERPL-MCNC: 6 MG/DL
CALCIUM SERPL-MCNC: 9.1 MG/DL
CHLORIDE SERPL-SCNC: 102 MMOL/L
CO2 SERPL-SCNC: 25 MMOL/L
CREAT SERPL-MCNC: 0.66 MG/DL
DEPRECATED KAPPA LC FREE/LAMBDA SER: 1.72 RATIO
EOSINOPHIL # BLD AUTO: 0.05 K/UL
EOSINOPHIL NFR BLD AUTO: 0.7 %
GGT SERPL-CCNC: 34 U/L
GLUCOSE SERPL-MCNC: 89 MG/DL
HCT VFR BLD CALC: 45.3 %
HGB BLD-MCNC: 14.3 G/DL
IGA SER QL IEP: 462 MG/DL
IGG SER QL IEP: 781 MG/DL
IGM SER QL IEP: 81 MG/DL
IMM GRANULOCYTES NFR BLD AUTO: 0.5 %
INR PPP: 0.98 RATIO
KAPPA LC CSF-MCNC: 1.58 MG/DL
KAPPA LC SERPL-MCNC: 2.71 MG/DL
LYMPHOCYTES # BLD AUTO: 1.3 K/UL
LYMPHOCYTES NFR BLD AUTO: 17.1 %
MAN DIFF?: NORMAL
MCHC RBC-ENTMCNC: 31.6 GM/DL
MCHC RBC-ENTMCNC: 32.5 PG
MCV RBC AUTO: 103 FL
MONOCYTES # BLD AUTO: 0.4 K/UL
MONOCYTES NFR BLD AUTO: 5.3 %
NEUTROPHILS # BLD AUTO: 5.75 K/UL
NEUTROPHILS NFR BLD AUTO: 75.6 %
PLATELET # BLD AUTO: 318 K/UL
POTASSIUM SERPL-SCNC: 4.1 MMOL/L
PROT SERPL-MCNC: 6.9 G/DL
PT BLD: 11.7 SEC
RBC # BLD: 4.4 M/UL
RBC # FLD: 14.6 %
SODIUM SERPL-SCNC: 142 MMOL/L
WBC # FLD AUTO: 7.6 K/UL

## 2022-01-28 LAB
6-MMPN: <190
6-TGN: 150

## 2022-02-14 ENCOUNTER — APPOINTMENT (OUTPATIENT)
Dept: OBGYN | Facility: CLINIC | Age: 62
End: 2022-02-14

## 2022-02-18 ENCOUNTER — NON-APPOINTMENT (OUTPATIENT)
Age: 62
End: 2022-02-18

## 2022-02-18 VITALS — HEIGHT: 63 IN | BODY MASS INDEX: 27.46 KG/M2 | WEIGHT: 155 LBS

## 2022-02-18 DIAGNOSIS — F17.210 NICOTINE DEPENDENCE, CIGARETTES, UNCOMPLICATED: ICD-10-CM

## 2022-02-18 NOTE — HISTORY OF PRESENT ILLNESS
[Current] : current smoker [_____ pack-years] : [unfilled] pack-years [TextBox_13] : Referred by Dr. Sophia Amador\par \par Ms. RIC GARCIA  is a 61 year old woman with a history of nicotine dependence.\par \par She  was seen in the office by Dr. Amador for review of eligibility for, as well as, discussion of Low-Dose CT lung cancer screening program. Over the telephone today we reviewed and confirmed that the patient meets screening eligibility criteria:\par -Age: 61 year \par -Smoking status:\par -Current smoker\par -Number of pack(s) per day: 1/2\par -Number of years smoked: 40\par -Number of pack years smokin\par \par She is currently smoking 5 cigarettes per day.\par \par Ms. GARCIA denies any signs or symptoms of lung cancer including new cough, change in cough, hemoptysis and unintentional weight loss. \par \par Ms. GARCIA denies any personal history of lung cancer. Lung cancer in a 1st degree relative: mother. History of lung disease: COPD, emphysema. History of occupational exposures: mold\par

## 2022-02-18 NOTE — ASSESSMENT
[Discussed Risks and Advised to Quit Smoking] : Discussed risks and advised to quit smoking [Discussed Cessation Medication] : cessation medication was discussed [Ready] : Patient is ready for cessation intervention [Contemplation] : Contemplation: The patient is considering quitting smoking

## 2022-02-18 NOTE — PLAN
[Smoking Cessation Guidance Provided] : Smoking cessation guidance was provided to patient [[facility]] : referred to [facility] [phone] [Smoking Cessation] : smoking cessation [Regular follow-up with healthcare provider] : regular follow-up with healthcare provider [FreeTextEntry1] : \par She is scheduled for LDCT on 3/2/22 at [facility] location. She will follow up the results with Dr. Amador\par Referred to [facility]

## 2022-02-18 NOTE — REASON FOR VISIT
[Home] : at home, [unfilled] , at the time of the visit. [Medical Office: ([facility])___] : at the medical office located in  [Verbal consent obtained from patient] : the patient, [unfilled] [Initial Evaluation] : an initial evaluation visit [Review of Eligibility] : review of eligibility [Low-Dose CT Screening Discussion] : low-dose CT lung cancer screening discussion [Virtual Visit] : virtual visit

## 2022-03-02 ENCOUNTER — RESULT REVIEW (OUTPATIENT)
Age: 62
End: 2022-03-02

## 2022-03-02 ENCOUNTER — APPOINTMENT (OUTPATIENT)
Dept: RADIOLOGY | Facility: IMAGING CENTER | Age: 62
End: 2022-03-02
Payer: MEDICARE

## 2022-03-02 ENCOUNTER — APPOINTMENT (OUTPATIENT)
Dept: CT IMAGING | Facility: IMAGING CENTER | Age: 62
End: 2022-03-02
Payer: MEDICARE

## 2022-03-02 ENCOUNTER — OUTPATIENT (OUTPATIENT)
Dept: OUTPATIENT SERVICES | Facility: HOSPITAL | Age: 62
LOS: 1 days | End: 2022-03-02
Payer: MEDICARE

## 2022-03-02 DIAGNOSIS — R10.31 RIGHT LOWER QUADRANT PAIN: ICD-10-CM

## 2022-03-02 PROCEDURE — 74177 CT ABD & PELVIS W/CONTRAST: CPT | Mod: 26,ME

## 2022-03-02 PROCEDURE — 74177 CT ABD & PELVIS W/CONTRAST: CPT | Mod: ME

## 2022-03-02 PROCEDURE — 77080 DXA BONE DENSITY AXIAL: CPT | Mod: 26

## 2022-03-02 PROCEDURE — 71271 CT THORAX LUNG CANCER SCR C-: CPT | Mod: 26,MH

## 2022-03-02 PROCEDURE — G1004: CPT

## 2022-03-02 PROCEDURE — 71271 CT THORAX LUNG CANCER SCR C-: CPT

## 2022-03-02 PROCEDURE — 82565 ASSAY OF CREATININE: CPT

## 2022-03-02 PROCEDURE — 77080 DXA BONE DENSITY AXIAL: CPT

## 2022-03-25 ENCOUNTER — APPOINTMENT (OUTPATIENT)
Dept: HEPATOLOGY | Facility: CLINIC | Age: 62
End: 2022-03-25
Payer: MEDICARE

## 2022-03-25 VITALS
WEIGHT: 163 LBS | HEART RATE: 74 BPM | BODY MASS INDEX: 28.88 KG/M2 | HEIGHT: 63 IN | SYSTOLIC BLOOD PRESSURE: 131 MMHG | RESPIRATION RATE: 16 BRPM | OXYGEN SATURATION: 97 % | DIASTOLIC BLOOD PRESSURE: 85 MMHG | TEMPERATURE: 97.8 F

## 2022-03-25 PROCEDURE — 91200 LIVER ELASTOGRAPHY: CPT

## 2022-03-25 PROCEDURE — 99214 OFFICE O/P EST MOD 30 MIN: CPT

## 2022-03-25 NOTE — ASSESSMENT
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 Liver enzymes improving. Doing well now\par Continue prednisone 5 mg daily\par Stop PPI now\par Added azathioprine 50 mg daily (10/9- )\par Fibroscan 3/25/22 - , 12.7 kPa c/w S1, F3 concerning for residual fibrosis after recent care home\par \par # Lower abdominal pain / R hip pain\par CT abd/pelvis w/ contrast 3/7/22 - no etiology identified\par She will see PCP, probably need a hip MRI vs lower spine\par Rx tramadol 50 mg po q 8 hrs for pain\par \par # HLD\par Increasing LDL\par Would not recommend further statin therapy\par Consider newer agents\par Dr. Yvan Bryson is her cardiologist is following\par \par We will continue to monitor RIC's exam and labs for disease progress closely while titrating autoimmune hepatitis immunosuppression.\par \par I have discussed the potential side effects of Imuran which includes pancreatitis, immunosuppression, risk of infections and malignancy, gi disturbances, and myalgias.\par \par RTC 6 months

## 2022-03-25 NOTE — HISTORY OF PRESENT ILLNESS
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 She also developed acute liver failure. She was started on prednisone on 9/29/21. \par She was given solumedrol 60 mg IV daily for several days and she was suppose to go home on prednisone 60 mg daily. Her FARIDEH was highly positive and ASMA was positive 1:40. TPMT enzyme normal.\par \par Since discharge on 10/6/21, she has only been taking prednisone 20 mg daily even though she should be taking 60 mg daily. Today is, 10/8/21 she feels well however. \par \par 11/12/21 visit - she is down to prednisone 20 mg daily as of 10/22/21. doing well and feels a lot of energy. she is on imuran and ursodiol.\par \par 1/14/22 visit - prednisone decreased to 5 mg daily on 12/10/21. Labs 12/20/21 looked OK. tolerating imuran 50 mg daily. saw cardiology. Lower back vs abdominal pain radiating to pelvis which is new.\par \par 3/25/22 visit - Fibroscan , 12.7 kPa c/w S0, F3. complains of R hip pain radiating forward to the front especially when standing.

## 2022-03-28 LAB
ALBUMIN SERPL ELPH-MCNC: 4.8 G/DL
ALP BLD-CCNC: 95 U/L
ALT SERPL-CCNC: 21 U/L
ANION GAP SERPL CALC-SCNC: 15 MMOL/L
AST SERPL-CCNC: 23 U/L
BASOPHILS # BLD AUTO: 0.07 K/UL
BASOPHILS NFR BLD AUTO: 0.6 %
BILIRUB SERPL-MCNC: 0.8 MG/DL
BUN SERPL-MCNC: 9 MG/DL
CALCIUM SERPL-MCNC: 9.8 MG/DL
CHLORIDE SERPL-SCNC: 100 MMOL/L
CO2 SERPL-SCNC: 26 MMOL/L
CREAT SERPL-MCNC: 0.65 MG/DL
EGFR: 99 ML/MIN/1.73M2
EOSINOPHIL # BLD AUTO: 0.05 K/UL
EOSINOPHIL NFR BLD AUTO: 0.5 %
GGT SERPL-CCNC: 26 U/L
GLUCOSE SERPL-MCNC: 98 MG/DL
HCT VFR BLD CALC: 44.4 %
HGB BLD-MCNC: 13.7 G/DL
IMM GRANULOCYTES NFR BLD AUTO: 0.5 %
INR PPP: 0.98 RATIO
LYMPHOCYTES # BLD AUTO: 1.82 K/UL
LYMPHOCYTES NFR BLD AUTO: 16.5 %
MAN DIFF?: NORMAL
MCHC RBC-ENTMCNC: 30.9 GM/DL
MCHC RBC-ENTMCNC: 31.7 PG
MCV RBC AUTO: 102.8 FL
MONOCYTES # BLD AUTO: 0.52 K/UL
MONOCYTES NFR BLD AUTO: 4.7 %
NEUTROPHILS # BLD AUTO: 8.54 K/UL
NEUTROPHILS NFR BLD AUTO: 77.2 %
PLATELET # BLD AUTO: 322 K/UL
POTASSIUM SERPL-SCNC: 4.8 MMOL/L
PROT SERPL-MCNC: 7.1 G/DL
PT BLD: 11.6 SEC
RBC # BLD: 4.32 M/UL
RBC # FLD: 14.2 %
SODIUM SERPL-SCNC: 142 MMOL/L
WBC # FLD AUTO: 11.06 K/UL

## 2022-05-12 ENCOUNTER — NON-APPOINTMENT (OUTPATIENT)
Age: 62
End: 2022-05-12

## 2022-05-12 ENCOUNTER — APPOINTMENT (OUTPATIENT)
Dept: ENDOCRINOLOGY | Facility: CLINIC | Age: 62
End: 2022-05-12
Payer: MEDICARE

## 2022-05-12 VITALS
HEIGHT: 63 IN | HEART RATE: 75 BPM | DIASTOLIC BLOOD PRESSURE: 87 MMHG | TEMPERATURE: 97 F | SYSTOLIC BLOOD PRESSURE: 138 MMHG | WEIGHT: 170.86 LBS | BODY MASS INDEX: 30.27 KG/M2 | OXYGEN SATURATION: 96 %

## 2022-05-12 PROCEDURE — 99204 OFFICE O/P NEW MOD 45 MIN: CPT | Mod: 25

## 2022-05-12 PROCEDURE — 36415 COLL VENOUS BLD VENIPUNCTURE: CPT

## 2022-05-12 NOTE — REVIEW OF SYSTEMS
[Recent Weight Gain (___ Lbs)] : recent weight gain: [unfilled] lbs [Difficulty Walking] : difficulty walking [Poor Balance] : poor balance [All other systems negative] : All other systems negative

## 2022-05-12 NOTE — HISTORY OF PRESENT ILLNESS
[FreeTextEntry1] : CC: Osteoporosis\par This is a 61-year-old female with osteoporosis, primary hypothyroidism, right 2.9 cm adrenal nodule, DILI–AIH presumably due to atorvastatin versus de cory AIH, on prednisone and azathioprine, here for evaluation.\par DEXA scan from March 2022 showed L1-L2 T score -1.9, femoral neck -2.8.\par She has never taken medications for osteoporosis.\par Denies a history of fragility fractures.\par She is taking calcium but is unsure of the dose.  She has been on ergocalciferol for approximately 4 weeks.\par Menarche was at the age of 9.\par Menopause was at the age of 46.\par She smokes approximately 10 cigarettes/day.\par She has been on prednisone since September 2021.  She also takes pantoprazole.\par \par She states that in her 20s she developed hyperthyroidism and had partial thyroidectomy.\par Approximately 5 years ago she was diagnosed with hypothyroidism and is currently taking levothyroxine 25 mcg daily.\par \par CT from March 2022 showed 2.9 cm right adrenal adenoma, stable compared to October 2021.\par

## 2022-05-12 NOTE — PHYSICAL EXAM
[Alert] : alert [Well Nourished] : well nourished [Healthy Appearance] : healthy appearance [No Acute Distress] : no acute distress [Well Developed] : well developed [Normal Voice/Communication] : normal voice communication [Normal Sclera/Conjunctiva] : normal sclera/conjunctiva [No Proptosis] : no proptosis [No Neck Mass] : no neck mass was observed [No LAD] : no lymphadenopathy [Supple] : the neck was supple [No Respiratory Distress] : no respiratory distress [No Spinal Tenderness] : no spinal tenderness [Normal Affect] : the affect was normal [Normal Insight/Judgement] : insight and judgment were intact [Normal Mood] : the mood was normal

## 2022-05-12 NOTE — ASSESSMENT
[FreeTextEntry1] : This is a 61-year-old female with osteoporosis, primary hypothyroidism, right 2.9 cm adrenal nodule, DILI–AIH presumably due to atorvastatin versus de cory AIH, on prednisone and azathioprine, here for evaluation.\par 1.  Osteoporosis\par DEXA scan from March 2022 showed L1-L2 T score -1.9, femoral neck -2.8.\par She has never taken medications for osteoporosis.\par Denies a history of fragility fractures.\par She is taking calcium but is unsure of the dose.  She has been on ergocalciferol for approximately 4 weeks.\par Menarche was at the age of 9.\par Menopause was at the age of 46.\par She has been on prednisone since September 2021.  She also takes pantoprazole.\par Risk factors for osteoporosis include postmenopausal  female with history of tobacco use, prednisone use, PPI use.\par Check CMP and 25 vitamin D.\par Will review treatment options pending results.  Prefer Prolia as she is on pantoprazole.\par She has dentures.\par 2.  Primary hypothyroidism,?  History of hyperthyroidism\par She states that in her 20s she developed hyperthyroidism and had partial thyroidectomy.\par Approximately 5 years ago she was diagnosed with hypothyroidism and is currently taking levothyroxine 25 mcg daily.  Check TFTs.\par Check thyroid antibodies.\par Check thyroid ultrasound.\par 3.  Adrenal adenoma\par CT from March 2022 showed 2.9 cm right adrenal adenoma, stable compared to October 2021.\par Will check for hormonal hyperfunction.\par

## 2022-05-16 DIAGNOSIS — E26.9 HYPERALDOSTERONISM, UNSPECIFIED: ICD-10-CM

## 2022-05-16 LAB
25(OH)D3 SERPL-MCNC: 38.8 NG/ML
ALDOSTERONE SERUM: 20.1 NG/DL
CORTIS SERPL-MCNC: 3.8 UG/DL
DHEA-S SERPL-MCNC: 4.7 UG/DL
T4 FREE SERPL-MCNC: 1.3 NG/DL
THYROGLOB AB SERPL-ACNC: <20 IU/ML
THYROPEROXIDASE AB SERPL IA-ACNC: <10 IU/ML
TSH SERPL-ACNC: 1.13 UIU/ML

## 2022-05-19 LAB
TSH RECEPTOR AB: <1.1 IU/L
TSI ACT/NOR SER: <0.1 IU/L

## 2022-05-24 LAB
CORTIS 24H UR-MCNC: 24 H
CORTIS 24H UR-MRATE: 14 MCG/24 H
DOPAMINE UR-MCNC: <30 PG/ML
EPINEPH UR-MCNC: 17 PG/ML
METANEPHRINE, PL: 29.1 PG/ML
NOREPINEPH UR-MCNC: 306 PG/ML
NORMETANEPHRINE, PL: 181.3 PG/ML
RENIN ACTIVITY, PLASMA: 3.15 NG/ML/HR
SPECIMEN VOL 24H UR: 2800 ML

## 2022-05-25 ENCOUNTER — NON-APPOINTMENT (OUTPATIENT)
Age: 62
End: 2022-05-25

## 2022-05-26 ENCOUNTER — OUTPATIENT (OUTPATIENT)
Dept: OUTPATIENT SERVICES | Facility: HOSPITAL | Age: 62
LOS: 1 days | End: 2022-05-26
Payer: MEDICARE

## 2022-05-26 ENCOUNTER — APPOINTMENT (OUTPATIENT)
Dept: ULTRASOUND IMAGING | Facility: CLINIC | Age: 62
End: 2022-05-26
Payer: MEDICARE

## 2022-05-26 DIAGNOSIS — E27.8 OTHER SPECIFIED DISORDERS OF ADRENAL GLAND: ICD-10-CM

## 2022-05-26 PROCEDURE — 76536 US EXAM OF HEAD AND NECK: CPT

## 2022-05-26 PROCEDURE — 76536 US EXAM OF HEAD AND NECK: CPT | Mod: 26

## 2022-06-10 ENCOUNTER — NON-APPOINTMENT (OUTPATIENT)
Age: 62
End: 2022-06-10

## 2022-06-29 ENCOUNTER — APPOINTMENT (OUTPATIENT)
Dept: HEPATOLOGY | Facility: CLINIC | Age: 62
End: 2022-06-29

## 2022-07-12 ENCOUNTER — APPOINTMENT (OUTPATIENT)
Dept: UROLOGY | Facility: CLINIC | Age: 62
End: 2022-07-12

## 2022-07-20 ENCOUNTER — APPOINTMENT (OUTPATIENT)
Dept: HEPATOLOGY | Facility: CLINIC | Age: 62
End: 2022-07-20

## 2022-07-20 VITALS
SYSTOLIC BLOOD PRESSURE: 131 MMHG | RESPIRATION RATE: 16 BRPM | WEIGHT: 168 LBS | OXYGEN SATURATION: 95 % | HEIGHT: 61.5 IN | HEART RATE: 80 BPM | BODY MASS INDEX: 31.31 KG/M2 | TEMPERATURE: 97.7 F | DIASTOLIC BLOOD PRESSURE: 82 MMHG

## 2022-07-20 PROCEDURE — 99214 OFFICE O/P EST MOD 30 MIN: CPT

## 2022-07-20 RX ORDER — PANTOPRAZOLE 40 MG/1
40 TABLET, DELAYED RELEASE ORAL DAILY
Qty: 60 | Refills: 3 | Status: DISCONTINUED | COMMUNITY
Start: 2021-10-08 | End: 2022-07-20

## 2022-07-20 RX ORDER — PREDNISONE 5 MG/1
5 TABLET ORAL
Qty: 90 | Refills: 3 | Status: DISCONTINUED | COMMUNITY
Start: 2021-10-08 | End: 2022-07-20

## 2022-07-20 RX ORDER — TRAMADOL HYDROCHLORIDE 50 MG/1
50 TABLET, COATED ORAL
Qty: 90 | Refills: 0 | Status: DISCONTINUED | COMMUNITY
Start: 2022-03-25 | End: 2022-07-20

## 2022-07-20 NOTE — ASSESSMENT
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 Liver enzymes improving. Doing well now\par Added azathioprine 50 mg daily (10/9- )\par Fibroscan 3/25/22 - , 12.7 kPa c/w S1, F3 concerning for residual fibrosis after recent BLAIR\par labs normal since 11/2022\par stop prednisone 5 mg daily now due to osteoporosis, weight gain\par recheck labs q 1 month x 3\par \par # HLD\par Increasing LDL\par Would not recommend further statin therapy\par now on repatha (psk9 inhibitor)\par cardiology/endocrinology following\par \par # osteoporosis\par OK to start treatment\par vitamin d / calcium BID\par \par We will continue to monitor RIC's exam and labs for disease progress closely while titrating autoimmune hepatitis immunosuppression.\par \par I have discussed the potential side effects of Imuran which includes pancreatitis, immunosuppression, risk of infections and malignancy, gi disturbances, and myalgias.\par \par RTC 3 months\par labs ordered q 1 month

## 2022-07-20 NOTE — HISTORY OF PRESENT ILLNESS
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 She also developed acute liver failure. She was started on prednisone on 9/29/21. \par She was given solumedrol 60 mg IV daily for several days and she was suppose to go home on prednisone 60 mg daily. Her FARIDEH was highly positive and ASMA was positive 1:40. TPMT enzyme normal.\par \par Since discharge on 10/6/21, she has only been taking prednisone 20 mg daily even though she should be taking 60 mg daily. Today is, 10/8/21 she feels well however. \par \par 11/12/21 visit - she is down to prednisone 20 mg daily as of 10/22/21. doing well and feels a lot of energy. she is on imuran and ursodiol.\par \par 1/14/22 visit - prednisone decreased to 5 mg daily on 12/10/21. Labs 12/20/21 looked OK. tolerating imuran 50 mg daily. saw cardiology. Lower back vs abdominal pain radiating to pelvis which is new.\par \par 3/25/22 visit - Fibroscan , 12.7 kPa c/w S0, F3. complains of R hip pain radiating forward to the front especially when standing. \par \par 7/20/22 visit - now on repatha every 2 weeks for the past 2-3 months for cholesterol issue. doing well otherwise. pending starting osteoporosis medication (unclear what).

## 2022-07-21 DIAGNOSIS — E87.5 HYPERKALEMIA: ICD-10-CM

## 2022-07-21 LAB
ALBUMIN SERPL ELPH-MCNC: 4.5 G/DL
ALP BLD-CCNC: 88 U/L
ALT SERPL-CCNC: 18 U/L
ANION GAP SERPL CALC-SCNC: 13 MMOL/L
AST SERPL-CCNC: 22 U/L
BASOPHILS # BLD AUTO: 0.06 K/UL
BASOPHILS NFR BLD AUTO: 0.8 %
BILIRUB SERPL-MCNC: 0.5 MG/DL
BUN SERPL-MCNC: 6 MG/DL
CALCIUM SERPL-MCNC: 9.7 MG/DL
CHLORIDE SERPL-SCNC: 105 MMOL/L
CO2 SERPL-SCNC: 27 MMOL/L
CREAT SERPL-MCNC: 0.73 MG/DL
EGFR: 93 ML/MIN/1.73M2
EOSINOPHIL # BLD AUTO: 0.06 K/UL
EOSINOPHIL NFR BLD AUTO: 0.8 %
GGT SERPL-CCNC: 18 U/L
GLUCOSE SERPL-MCNC: 120 MG/DL
HCT VFR BLD CALC: 45.1 %
HGB BLD-MCNC: 14.2 G/DL
IMM GRANULOCYTES NFR BLD AUTO: 0.5 %
INR PPP: 0.96 RATIO
LYMPHOCYTES # BLD AUTO: 1.38 K/UL
LYMPHOCYTES NFR BLD AUTO: 17.8 %
MAN DIFF?: NORMAL
MCHC RBC-ENTMCNC: 31.4 PG
MCHC RBC-ENTMCNC: 31.5 GM/DL
MCV RBC AUTO: 99.8 FL
MONOCYTES # BLD AUTO: 0.48 K/UL
MONOCYTES NFR BLD AUTO: 6.2 %
NEUTROPHILS # BLD AUTO: 5.72 K/UL
NEUTROPHILS NFR BLD AUTO: 73.9 %
PLATELET # BLD AUTO: 294 K/UL
POTASSIUM SERPL-SCNC: 5.6 MMOL/L
PROT SERPL-MCNC: 7.1 G/DL
PT BLD: 11.2 SEC
RBC # BLD: 4.52 M/UL
RBC # FLD: 15 %
SODIUM SERPL-SCNC: 145 MMOL/L
WBC # FLD AUTO: 7.74 K/UL

## 2022-07-28 ENCOUNTER — APPOINTMENT (OUTPATIENT)
Dept: INTERNAL MEDICINE | Facility: CLINIC | Age: 62
End: 2022-07-28

## 2022-07-28 PROCEDURE — 96372 THER/PROPH/DIAG INJ SC/IM: CPT

## 2022-07-28 RX ORDER — DENOSUMAB 60 MG/ML
60 INJECTION SUBCUTANEOUS
Qty: 1 | Refills: 0 | Status: COMPLETED | OUTPATIENT
Start: 2022-07-28

## 2022-07-28 RX ADMIN — DENOSUMAB 60 MG/ML: 60 INJECTION SUBCUTANEOUS at 00:00

## 2022-08-01 LAB
ANION GAP SERPL CALC-SCNC: 12 MMOL/L
BUN SERPL-MCNC: 4 MG/DL
CALCIUM SERPL-MCNC: 8.6 MG/DL
CHLORIDE SERPL-SCNC: 105 MMOL/L
CO2 SERPL-SCNC: 25 MMOL/L
CREAT SERPL-MCNC: 0.63 MG/DL
EGFR: 100 ML/MIN/1.73M2
POTASSIUM SERPL-SCNC: 4.3 MMOL/L
SODIUM SERPL-SCNC: 142 MMOL/L

## 2022-08-29 LAB
ALBUMIN SERPL ELPH-MCNC: 4.3 G/DL
ALP BLD-CCNC: 76 U/L
ALT SERPL-CCNC: 19 U/L
ANION GAP SERPL CALC-SCNC: 11 MMOL/L
AST SERPL-CCNC: 22 U/L
BASOPHILS # BLD AUTO: 0.06 K/UL
BASOPHILS NFR BLD AUTO: 1 %
BILIRUB SERPL-MCNC: 0.6 MG/DL
BUN SERPL-MCNC: 7 MG/DL
CALCIUM SERPL-MCNC: 8.7 MG/DL
CHLORIDE SERPL-SCNC: 106 MMOL/L
CO2 SERPL-SCNC: 26 MMOL/L
CREAT SERPL-MCNC: 0.69 MG/DL
EGFR: 98 ML/MIN/1.73M2
EOSINOPHIL # BLD AUTO: 0.07 K/UL
EOSINOPHIL NFR BLD AUTO: 1.2 %
GGT SERPL-CCNC: 15 U/L
GLUCOSE SERPL-MCNC: 76 MG/DL
HCT VFR BLD CALC: 42.3 %
HGB BLD-MCNC: 14 G/DL
IMM GRANULOCYTES NFR BLD AUTO: 0.2 %
INR PPP: 1.01 RATIO
LYMPHOCYTES # BLD AUTO: 2.03 K/UL
LYMPHOCYTES NFR BLD AUTO: 34.4 %
MAN DIFF?: NORMAL
MCHC RBC-ENTMCNC: 32.2 PG
MCHC RBC-ENTMCNC: 33.1 GM/DL
MCV RBC AUTO: 97.2 FL
MONOCYTES # BLD AUTO: 0.45 K/UL
MONOCYTES NFR BLD AUTO: 7.6 %
NEUTROPHILS # BLD AUTO: 3.28 K/UL
NEUTROPHILS NFR BLD AUTO: 55.6 %
PLATELET # BLD AUTO: 290 K/UL
POTASSIUM SERPL-SCNC: 4.3 MMOL/L
PROT SERPL-MCNC: 6.3 G/DL
PT BLD: 11.9 SEC
RBC # BLD: 4.35 M/UL
RBC # FLD: 14.5 %
SODIUM SERPL-SCNC: 143 MMOL/L
WBC # FLD AUTO: 5.9 K/UL

## 2022-08-30 RX ORDER — SODIUM ZIRCONIUM CYCLOSILICATE 10 G/10G
10 POWDER, FOR SUSPENSION ORAL 3 TIMES DAILY
Qty: 6 | Refills: 0 | Status: DISCONTINUED | COMMUNITY
Start: 2022-07-25 | End: 2022-08-30

## 2022-09-23 LAB
ALBUMIN SERPL ELPH-MCNC: 4.2 G/DL
ALP BLD-CCNC: 71 U/L
ALT SERPL-CCNC: 17 U/L
ANION GAP SERPL CALC-SCNC: 10 MMOL/L
AST SERPL-CCNC: 22 U/L
BASOPHILS # BLD AUTO: 0.07 K/UL
BASOPHILS NFR BLD AUTO: 1 %
BILIRUB SERPL-MCNC: 0.7 MG/DL
BUN SERPL-MCNC: 5 MG/DL
CALCIUM SERPL-MCNC: 8.9 MG/DL
CHLORIDE SERPL-SCNC: 103 MMOL/L
CO2 SERPL-SCNC: 28 MMOL/L
CREAT SERPL-MCNC: 0.65 MG/DL
EGFR: 99 ML/MIN/1.73M2
EOSINOPHIL # BLD AUTO: 0.11 K/UL
EOSINOPHIL NFR BLD AUTO: 1.6 %
GGT SERPL-CCNC: 19 U/L
GLUCOSE SERPL-MCNC: 89 MG/DL
HCT VFR BLD CALC: 44.1 %
HGB BLD-MCNC: 14.4 G/DL
IMM GRANULOCYTES NFR BLD AUTO: 0.4 %
LYMPHOCYTES # BLD AUTO: 1.91 K/UL
LYMPHOCYTES NFR BLD AUTO: 28.3 %
MAN DIFF?: NORMAL
MCHC RBC-ENTMCNC: 31.6 PG
MCHC RBC-ENTMCNC: 32.7 GM/DL
MCV RBC AUTO: 96.7 FL
MONOCYTES # BLD AUTO: 0.42 K/UL
MONOCYTES NFR BLD AUTO: 6.2 %
NEUTROPHILS # BLD AUTO: 4.22 K/UL
NEUTROPHILS NFR BLD AUTO: 62.5 %
PLATELET # BLD AUTO: 271 K/UL
POTASSIUM SERPL-SCNC: 4.5 MMOL/L
PROT SERPL-MCNC: 6.6 G/DL
RBC # BLD: 4.56 M/UL
RBC # FLD: 14.1 %
SODIUM SERPL-SCNC: 140 MMOL/L
WBC # FLD AUTO: 6.76 K/UL

## 2022-09-26 LAB
INR PPP: 1.01 RATIO
PT BLD: 11.7 SEC

## 2022-10-26 ENCOUNTER — APPOINTMENT (OUTPATIENT)
Dept: HEPATOLOGY | Facility: CLINIC | Age: 62
End: 2022-10-26

## 2022-10-26 VITALS
BODY MASS INDEX: 31.1 KG/M2 | DIASTOLIC BLOOD PRESSURE: 77 MMHG | RESPIRATION RATE: 16 BRPM | TEMPERATURE: 98 F | WEIGHT: 169 LBS | OXYGEN SATURATION: 97 % | SYSTOLIC BLOOD PRESSURE: 145 MMHG | HEART RATE: 80 BPM | HEIGHT: 62 IN

## 2022-10-26 PROCEDURE — 99214 OFFICE O/P EST MOD 30 MIN: CPT

## 2022-10-26 RX ORDER — B-COMPLEX WITH VITAMIN C
600-200 TABLET ORAL TWICE DAILY
Qty: 180 | Refills: 3 | Status: DISCONTINUED | COMMUNITY
Start: 2022-07-20 | End: 2022-10-26

## 2022-11-01 LAB
ALBUMIN SERPL ELPH-MCNC: 4.5 G/DL
ALP BLD-CCNC: 64 U/L
ALPHA-1-FETOPROTEIN-L3: NORMAL %
ALPHA-1-FETOPROTEIN: 5.3 NG/ML
ALT SERPL-CCNC: 15 U/L
ANION GAP SERPL CALC-SCNC: 10 MMOL/L
AST SERPL-CCNC: 23 U/L
BASOPHILS # BLD AUTO: 0.07 K/UL
BASOPHILS NFR BLD AUTO: 0.6 %
BILIRUB SERPL-MCNC: 0.5 MG/DL
BUN SERPL-MCNC: 6 MG/DL
CALCIUM SERPL-MCNC: 9.2 MG/DL
CHLORIDE SERPL-SCNC: 104 MMOL/L
CO2 SERPL-SCNC: 26 MMOL/L
CREAT SERPL-MCNC: 0.67 MG/DL
EGFR: 98 ML/MIN/1.73M2
EOSINOPHIL # BLD AUTO: 0.07 K/UL
EOSINOPHIL NFR BLD AUTO: 0.6 %
GLUCOSE SERPL-MCNC: 96 MG/DL
HCT VFR BLD CALC: 45 %
HGB BLD-MCNC: 14.7 G/DL
IGG SER QL IEP: 739 MG/DL
IMM GRANULOCYTES NFR BLD AUTO: 0.3 %
INR PPP: 0.98 RATIO
LYMPHOCYTES # BLD AUTO: 2.37 K/UL
LYMPHOCYTES NFR BLD AUTO: 21.8 %
MAN DIFF?: NORMAL
MCHC RBC-ENTMCNC: 32 PG
MCHC RBC-ENTMCNC: 32.7 GM/DL
MCV RBC AUTO: 97.8 FL
MONOCYTES # BLD AUTO: 0.52 K/UL
MONOCYTES NFR BLD AUTO: 4.8 %
NEUTROPHILS # BLD AUTO: 7.82 K/UL
NEUTROPHILS NFR BLD AUTO: 71.9 %
PLATELET # BLD AUTO: 299 K/UL
POTASSIUM SERPL-SCNC: 4.5 MMOL/L
PROT SERPL-MCNC: 7 G/DL
PT BLD: 11.5 SEC
RBC # BLD: 4.6 M/UL
RBC # FLD: 14.1 %
SODIUM SERPL-SCNC: 141 MMOL/L
WBC # FLD AUTO: 10.88 K/UL

## 2022-11-02 ENCOUNTER — APPOINTMENT (OUTPATIENT)
Dept: ULTRASOUND IMAGING | Facility: IMAGING CENTER | Age: 62
End: 2022-11-02

## 2022-11-02 ENCOUNTER — OUTPATIENT (OUTPATIENT)
Dept: OUTPATIENT SERVICES | Facility: HOSPITAL | Age: 62
LOS: 1 days | End: 2022-11-02
Payer: MEDICARE

## 2022-11-02 DIAGNOSIS — K75.4 AUTOIMMUNE HEPATITIS: ICD-10-CM

## 2022-11-02 PROCEDURE — 76700 US EXAM ABDOM COMPLETE: CPT

## 2022-11-02 PROCEDURE — 76700 US EXAM ABDOM COMPLETE: CPT | Mod: 26

## 2022-11-09 ENCOUNTER — NON-APPOINTMENT (OUTPATIENT)
Age: 62
End: 2022-11-09

## 2022-11-28 ENCOUNTER — APPOINTMENT (OUTPATIENT)
Dept: ENDOCRINOLOGY | Facility: CLINIC | Age: 62
End: 2022-11-28

## 2022-11-28 VITALS
HEART RATE: 70 BPM | WEIGHT: 168.13 LBS | DIASTOLIC BLOOD PRESSURE: 75 MMHG | HEIGHT: 62.6 IN | OXYGEN SATURATION: 98 % | SYSTOLIC BLOOD PRESSURE: 118 MMHG | TEMPERATURE: 98 F | BODY MASS INDEX: 30.16 KG/M2

## 2022-11-28 PROCEDURE — 36415 COLL VENOUS BLD VENIPUNCTURE: CPT

## 2022-11-28 PROCEDURE — 99214 OFFICE O/P EST MOD 30 MIN: CPT | Mod: 25

## 2022-11-28 RX ORDER — EVOLOCUMAB 140 MG/ML
140 INJECTION, SOLUTION SUBCUTANEOUS
Qty: 2 | Refills: 0 | Status: ACTIVE | COMMUNITY
Start: 2022-11-22

## 2022-12-05 NOTE — HISTORY OF PRESENT ILLNESS
[FreeTextEntry1] : CC: Osteoporosis\par This is a 62-year-old female with osteoporosis, primary hypothyroidism, hypertension, right 2.9 cm adrenal nodule, DILI–AIH presumably due to atorvastatin versus de cory AIH, here for follow up.\par DEXA scan from March 2022 showed L1-L2 T score -1.9, femoral neck -2.8.\par She received Prolia on July 28, 2022 without incident.\par Denies a history of fragility fractures.\par Menarche was at the age of 9.\par Menopause was at the age of 46.\par She has been off prednisone since August 2022.  She is off pantoprazole.\par \par She states that in her 20s she developed hyperthyroidism and had partial thyroidectomy.\par Approximately 5 years ago she was diagnosed with hypothyroidism and is currently taking levothyroxine 25 mcg daily.\par \par CT from March 2022 showed 2.9 cm right adrenal adenoma, stable compared to October 2021.\par

## 2022-12-05 NOTE — ASSESSMENT
[FreeTextEntry1] : This is a 62-year-old female with osteoporosis, primary hypothyroidism, right 2.9 cm adrenal nodule, hypertension, DILI–AIH presumably due to atorvastatin versus de cory AIH, here for follow-up.\par 1.  Osteoporosis\par DEXA scan from March 2022 showed L1-L2 T score -1.9, femoral neck -2.8.\par Denies a history of fragility fractures.\par Risk factors for osteoporosis include postmenopausal  female with history of tobacco use, prednisone use, PPI use.\par Check CMP and 25 vitamin D.\par Received Prolia on July 20, 2022 without incident.  Next dose due January 2023.\par She has dentures.\par 2.  Primary hypothyroidism\par Check TFTs.\par Thyroid antibodies negative.\par No thyroid nodules noted on thyroid ultrasound.\par 3.  Adrenal adenoma\par CT from March 2022 showed 2.9 cm right adrenal adenoma, stable compared to October 2021.\par Will check for hormonal hyperfunction.\par Check imaging in March 2023.\par

## 2022-12-05 NOTE — REVIEW OF SYSTEMS
[All other systems negative] : All other systems negative [FreeTextEntry9] : Pain in upper back/neck [de-identified] : Neuropathy in hands

## 2022-12-09 LAB
25(OH)D3 SERPL-MCNC: 22.2 NG/ML
ALDOSTERONE SERUM: 13.2 NG/DL
CORTIS SAL-MCNC: NORMAL
CORTIS SERPL-MCNC: 4.3 UG/DL
DHEA-S SERPL-MCNC: 4 UG/DL
DOPAMINE UR-MCNC: <30 PG/ML
EPINEPH UR-MCNC: <15 PG/ML
ESTIMATED AVERAGE GLUCOSE: 120 MG/DL
HBA1C MFR BLD HPLC: 5.8 %
NOREPINEPH UR-MCNC: 470 PG/ML
RENIN ACTIVITY, PLASMA: 1.96 NG/ML/HR
T4 FREE SERPL-MCNC: 1.2 NG/DL
TSH SERPL-ACNC: 2.12 UIU/ML

## 2022-12-13 ENCOUNTER — NON-APPOINTMENT (OUTPATIENT)
Age: 62
End: 2022-12-13

## 2022-12-21 ENCOUNTER — NON-APPOINTMENT (OUTPATIENT)
Age: 62
End: 2022-12-21

## 2023-01-26 ENCOUNTER — APPOINTMENT (OUTPATIENT)
Dept: INTERNAL MEDICINE | Facility: CLINIC | Age: 63
End: 2023-01-26
Payer: MEDICARE

## 2023-01-26 PROCEDURE — 36415 COLL VENOUS BLD VENIPUNCTURE: CPT

## 2023-01-29 LAB
25(OH)D3 SERPL-MCNC: 30.5 NG/ML
ALBUMIN SERPL ELPH-MCNC: 4.3 G/DL
ALP BLD-CCNC: 74 U/L
ALT SERPL-CCNC: 12 U/L
ANION GAP SERPL CALC-SCNC: 11 MMOL/L
AST SERPL-CCNC: 19 U/L
BILIRUB SERPL-MCNC: 0.4 MG/DL
BUN SERPL-MCNC: 6 MG/DL
CALCIUM SERPL-MCNC: 9.3 MG/DL
CHLORIDE SERPL-SCNC: 104 MMOL/L
CO2 SERPL-SCNC: 27 MMOL/L
CREAT SERPL-MCNC: 0.59 MG/DL
EGFR: 101 ML/MIN/1.73M2
GLUCOSE SERPL-MCNC: 93 MG/DL
POTASSIUM SERPL-SCNC: 4.6 MMOL/L
PROT SERPL-MCNC: 6.7 G/DL
SODIUM SERPL-SCNC: 142 MMOL/L

## 2023-01-30 ENCOUNTER — NON-APPOINTMENT (OUTPATIENT)
Age: 63
End: 2023-01-30

## 2023-02-01 ENCOUNTER — APPOINTMENT (OUTPATIENT)
Dept: INTERNAL MEDICINE | Facility: CLINIC | Age: 63
End: 2023-02-01
Payer: MEDICARE

## 2023-02-01 PROCEDURE — 96372 THER/PROPH/DIAG INJ SC/IM: CPT

## 2023-02-01 RX ORDER — DENOSUMAB 60 MG/ML
60 INJECTION SUBCUTANEOUS
Qty: 1 | Refills: 0 | Status: COMPLETED | OUTPATIENT
Start: 2023-02-01

## 2023-02-01 RX ADMIN — DENOSUMAB 60 MG/ML: 60 INJECTION SUBCUTANEOUS at 00:00

## 2023-02-09 ENCOUNTER — APPOINTMENT (OUTPATIENT)
Dept: HEPATOLOGY | Facility: CLINIC | Age: 63
End: 2023-02-09
Payer: MEDICARE

## 2023-02-09 VITALS
RESPIRATION RATE: 16 BRPM | DIASTOLIC BLOOD PRESSURE: 77 MMHG | OXYGEN SATURATION: 97 % | SYSTOLIC BLOOD PRESSURE: 115 MMHG | HEART RATE: 73 BPM

## 2023-02-09 DIAGNOSIS — M79.609 PAIN IN UNSPECIFIED LIMB: ICD-10-CM

## 2023-02-09 PROCEDURE — 99214 OFFICE O/P EST MOD 30 MIN: CPT

## 2023-02-09 RX ORDER — GABAPENTIN 100 MG/1
100 CAPSULE ORAL
Qty: 30 | Refills: 1 | Status: ACTIVE | COMMUNITY
Start: 2023-02-09 | End: 1900-01-01

## 2023-02-13 ENCOUNTER — APPOINTMENT (OUTPATIENT)
Dept: ORTHOPEDIC SURGERY | Facility: CLINIC | Age: 63
End: 2023-02-13
Payer: MEDICARE

## 2023-02-13 VITALS — BODY MASS INDEX: 31.28 KG/M2 | HEIGHT: 62 IN | WEIGHT: 170 LBS

## 2023-02-13 DIAGNOSIS — M47.812 SPONDYLOSIS W/OUT MYELOPATHY OR RADICULOPATHY, CERVICAL REGION: ICD-10-CM

## 2023-02-13 PROCEDURE — 72040 X-RAY EXAM NECK SPINE 2-3 VW: CPT

## 2023-02-13 PROCEDURE — 99204 OFFICE O/P NEW MOD 45 MIN: CPT

## 2023-02-13 NOTE — PROVIDER CONTACT NOTE (OTHER) - BACKGROUND
no
pt pending MRI Abdomen
patient admitted for transaminitis and hyperbilirubinemia. Hx of DJD, hypothyroidism, and COPD

## 2023-02-15 LAB
ALBUMIN SERPL ELPH-MCNC: 4.4 G/DL
ALP BLD-CCNC: 78 U/L
ALPHA-1-FETOPROTEIN-L3: NORMAL %
ALPHA-1-FETOPROTEIN: 5 NG/ML
ALT SERPL-CCNC: 16 U/L
ANION GAP SERPL CALC-SCNC: 13 MMOL/L
APPEARANCE: CLEAR
AST SERPL-CCNC: 19 U/L
BASOPHILS # BLD AUTO: 0.05 K/UL
BASOPHILS NFR BLD AUTO: 0.7 %
BILIRUB SERPL-MCNC: 0.4 MG/DL
BILIRUBIN URINE: NEGATIVE
BLOOD URINE: NEGATIVE
BUN SERPL-MCNC: 7 MG/DL
CALCIUM SERPL-MCNC: 8.9 MG/DL
CHLORIDE SERPL-SCNC: 104 MMOL/L
CO2 SERPL-SCNC: 23 MMOL/L
COLOR: COLORLESS
CREAT SERPL-MCNC: 0.64 MG/DL
EGFR: 99 ML/MIN/1.73M2
EOSINOPHIL # BLD AUTO: 0.08 K/UL
EOSINOPHIL NFR BLD AUTO: 1.2 %
GLUCOSE QUALITATIVE U: NEGATIVE
GLUCOSE SERPL-MCNC: 91 MG/DL
HCT VFR BLD CALC: 42.5 %
HGB BLD-MCNC: 13.5 G/DL
IMM GRANULOCYTES NFR BLD AUTO: 0.3 %
INR PPP: 0.88 RATIO
KETONES URINE: NEGATIVE
LEUKOCYTE ESTERASE URINE: NEGATIVE
LYMPHOCYTES # BLD AUTO: 2 K/UL
LYMPHOCYTES NFR BLD AUTO: 29.1 %
MAN DIFF?: NORMAL
MCHC RBC-ENTMCNC: 31.7 PG
MCHC RBC-ENTMCNC: 31.8 GM/DL
MCV RBC AUTO: 99.8 FL
MONOCYTES # BLD AUTO: 0.37 K/UL
MONOCYTES NFR BLD AUTO: 5.4 %
NEUTROPHILS # BLD AUTO: 4.35 K/UL
NEUTROPHILS NFR BLD AUTO: 63.3 %
NITRITE URINE: NEGATIVE
PH URINE: 6.5
PLATELET # BLD AUTO: 271 K/UL
POTASSIUM SERPL-SCNC: 4.4 MMOL/L
PROT SERPL-MCNC: 7 G/DL
PROTEIN URINE: NEGATIVE
PT BLD: 10.3 SEC
RBC # BLD: 4.26 M/UL
RBC # FLD: 14.2 %
SODIUM SERPL-SCNC: 140 MMOL/L
SPECIFIC GRAVITY URINE: 1
UROBILINOGEN URINE: NORMAL
WBC # FLD AUTO: 6.87 K/UL

## 2023-02-24 ENCOUNTER — NON-APPOINTMENT (OUTPATIENT)
Age: 63
End: 2023-02-24

## 2023-05-09 ENCOUNTER — APPOINTMENT (OUTPATIENT)
Dept: HEPATOLOGY | Facility: CLINIC | Age: 63
End: 2023-05-09
Payer: MEDICARE

## 2023-05-09 VITALS
WEIGHT: 167 LBS | HEIGHT: 62 IN | HEART RATE: 70 BPM | TEMPERATURE: 98.1 F | SYSTOLIC BLOOD PRESSURE: 136 MMHG | RESPIRATION RATE: 15 BRPM | BODY MASS INDEX: 30.73 KG/M2 | DIASTOLIC BLOOD PRESSURE: 86 MMHG | OXYGEN SATURATION: 97 %

## 2023-05-09 PROCEDURE — 99214 OFFICE O/P EST MOD 30 MIN: CPT

## 2023-05-09 RX ORDER — AZATHIOPRINE 50 1/1
50 TABLET ORAL
Qty: 90 | Refills: 3 | Status: ACTIVE | COMMUNITY
Start: 2021-10-08 | End: 1900-01-01

## 2023-05-11 LAB
ANA SER IF-ACNC: NEGATIVE
SMOOTH MUSCLE AB SER QL IF: NORMAL

## 2023-05-15 NOTE — PHYSICAL EXAM
[General Appearance - Alert] : alert [General Appearance - In No Acute Distress] : in no acute distress [Outer Ear] : the ears and nose were normal in appearance [Oropharynx] : the oropharynx was normal [Neck Appearance] : the appearance of the neck was normal [Neck Cervical Mass (___cm)] : no neck mass was observed [Jugular Venous Distention Increased] : there was no jugular-venous distention [Thyroid Diffuse Enlargement] : the thyroid was not enlarged [Thyroid Nodule] : there were no palpable thyroid nodules [Auscultation Breath Sounds / Voice Sounds] : lungs were clear to auscultation bilaterally [Bowel Sounds] : normal bowel sounds [Abdomen Soft] : soft [Abdomen Tenderness] : non-tender [Abdomen Mass (___ Cm)] : no abdominal mass palpated [Cervical Lymph Nodes Enlarged Posterior Bilaterally] : posterior cervical [Cervical Lymph Nodes Enlarged Anterior Bilaterally] : anterior cervical [Supraclavicular Lymph Nodes Enlarged Bilaterally] : supraclavicular [Axillary Lymph Nodes Enlarged Bilaterally] : axillary [Femoral Lymph Nodes Enlarged Bilaterally] : femoral [Inguinal Lymph Nodes Enlarged Bilaterally] : inguinal [No CVA Tenderness] : no ~M costovertebral angle tenderness [No Spinal Tenderness] : no spinal tenderness [Skin Color & Pigmentation] : normal skin color and pigmentation [Skin Turgor] : normal skin turgor [] : no rash [Deep Tendon Reflexes (DTR)] : deep tendon reflexes were 2+ and symmetric [Sensation] : the sensory exam was normal to light touch and pinprick [No Focal Deficits] : no focal deficits [Oriented To Time, Place, And Person] : oriented to person, place, and time [Impaired Insight] : insight and judgment were intact [Affect] : the affect was normal [Heart Rate And Rhythm] : heart rate was normal and rhythm regular [Heart Sounds] : normal S1 and S2 [Heart Sounds Gallop] : no gallops [Murmurs] : no murmurs [Heart Sounds Pericardial Friction Rub] : no pericardial rub [Full Pulse] : the pedal pulses are present [Edema] : there was no peripheral edema [Scleral Icterus] : No Scleral Icterus [Spider Angioma] : No spider angioma(s) were observed [Abdominal  Ascites] : no ascites [Ascites Fluid Wave] : no ascites fluid wave [Ascites Tense] : ascites is not tense [Asterixis] : no asterixis observed [Jaundice] : No jaundice [Depression] : no depression

## 2023-05-15 NOTE — END OF VISIT
[FreeTextEntry3] : I saw and evaluated the patient with NP Shola.  I discussed the care of this patient with the NP providing the service, and was directly responsible for the patient's management. My discussion with the NP included the patient's history, physical exam, laboratory findings, and medical decision-making. I agree with the documented findings and plan of care of the NP's note. Spent > 30 minutes collectively in reviewing records, performing patient history and physical examination, and formulating recommendations/plan of care.\par

## 2023-05-15 NOTE — HISTORY OF PRESENT ILLNESS
[FreeTextEntry1] : Ms. RIC GARCIA a 62-year-old white female presented for a hepatology appointment. She had been a patient under the care of Dr. Bennie Serra in the past\par \par The patient's past medical history is significant for the following conditions:\par \par -Hypothyroidism\par -History of drug-induced liver injury-autoimmune hepatitis, presumed to be related to Lipitor (September 2021)\par -De cory autoimmune hepatitis with residual fibrosis based on FibroScan (F3 on Fibroscan, March 25, 2022)\par -Osteoporosis\par \par The patient had been on Lipitor for many years and developed jaundice in September 2021. She was admitted to the hospital from September 24, 2021, to October 5, 2021, with severe mixed cholestatic hepatocellular liver injury, which was presumed to be either due to Lipitor or de cory autoimmune hepatitis. Additionally, she experienced acute liver failure during this period. Steroid treatment was initiated and gradually tapered off over time. Laboratory tests revealed a highly positive FARIDEH and a positive ASMA (1:40). The TPMT enzyme was found to be normal. Subsequently, the patient was switched to Imuran (azathioprine) and ursodiol. Currently, she continues to take azathioprine 50 mg daily but is no longer on ursodiol.\par \par A FibroScan performed on March 25, 2022, showed a CAP score of 253 dB/m and a liver stiffness score of 12.7 kPa, indicating F3–bridging fibrosis and mild steatosis.\par \par During the follow-up appointment on May 9, 2023, the patient reported overall improvement but mentioned persistent pain and tingling sensations in both upper and lower extremities. She had sought orthopedic care for this issue. In the past, she had been prescribed gabapentin for this condition but had run out of her prescription and now believes it to be a narcotic.

## 2023-05-15 NOTE — ASSESSMENT
[FreeTextEntry1] : The patient is a 62-year-old female with a medical history of hypothyroidism, drug-induced liver injury-autoimmune hepatitis (presumed to be related to Lipitor or de cory autoimmune hepatitis), F3 fibrosis on FibroScan from March 25, 2022, osteoporosis, and she is currently maintained on azathioprine 50 mg daily with normalized liver test results.\par \par Assessment and Plan:\par \par I.  Autoimmune Hepatitis (AIH)\par \par -The patient's liver enzymes have normalized, and she is currently doing well.\par -She has discontinued the use of steroids.\par -The patient is currently taking azathioprine 50 mg daily since October 2021.\par -The FibroScan from March 25, 2022, showed a CAP score of 253, and liver stiffness score of 12.7 kPa, suggestive of stage 1 fibrosis (S1) and F3 fibrosis, which is concerning for residual fibrosis after recent acute liver failure (snf).\par -The plan is to consider weaning off azathioprine during the next follow-up visit if the patient's liver tests continue to remain normal. A liver biopsy may be performed prior to weaning off azathioprine to assess histological remission. Typically, 18 months of biochemical remission is preferred before pursuing a liver biopsy for assessing histological remission.\par \par II. Hyperlipidemia (HLD)\par \par The patient's cardiologist, Dr. Yvan Bryson, is managing her hyperlipidemia.\par Currently, she is on Repatha for the management of her lipid levels.\par \par III. Neuropathic Pain in Upper Extremities\par \par I have recommended that the patient see a neurologist for further evaluation and review of her symptoms.\par The orthopedic specialist also suggested a neurology consultation.\par UpdateCopied to session clipboard\par \par \par RTC in 6 months with fibroscan and repeat labs.\par \par Patient seen and plan discussed with Dr Hayes.\par \par Cheyenne Jolley, MSN, FNP-BC\par Transplant Hepatology Nurse Practitioner\par [facility]\par [address] [place] [id number]\par T: [phone] | F: [phone]

## 2023-05-22 LAB
ALBUMIN SERPL ELPH-MCNC: 4.4 G/DL
ALP BLD-CCNC: 60 U/L
ALPHA-1-FETOPROTEIN-L3: NORMAL %
ALPHA-1-FETOPROTEIN: 4.4 NG/ML
ALT SERPL-CCNC: 12 U/L
ANION GAP SERPL CALC-SCNC: 10 MMOL/L
AST SERPL-CCNC: 18 U/L
BASOPHILS # BLD AUTO: 0.05 K/UL
BASOPHILS NFR BLD AUTO: 0.9 %
BILIRUB SERPL-MCNC: 0.4 MG/DL
BUN SERPL-MCNC: 6 MG/DL
CALCIUM SERPL-MCNC: 9.4 MG/DL
CHLORIDE SERPL-SCNC: 102 MMOL/L
CO2 SERPL-SCNC: 26 MMOL/L
CREAT SERPL-MCNC: 0.68 MG/DL
EGFR: 98 ML/MIN/1.73M2
EOSINOPHIL # BLD AUTO: 0.14 K/UL
EOSINOPHIL NFR BLD AUTO: 2.4 %
GLUCOSE SERPL-MCNC: 97 MG/DL
HCT VFR BLD CALC: 42 %
HGB BLD-MCNC: 13.7 G/DL
IMM GRANULOCYTES NFR BLD AUTO: 0.2 %
INR PPP: 0.95 RATIO
LYMPHOCYTES # BLD AUTO: 2.18 K/UL
LYMPHOCYTES NFR BLD AUTO: 37.5 %
MAN DIFF?: NORMAL
MCHC RBC-ENTMCNC: 31.5 PG
MCHC RBC-ENTMCNC: 32.6 GM/DL
MCV RBC AUTO: 96.6 FL
MONOCYTES # BLD AUTO: 0.37 K/UL
MONOCYTES NFR BLD AUTO: 6.4 %
NEUTROPHILS # BLD AUTO: 3.07 K/UL
NEUTROPHILS NFR BLD AUTO: 52.6 %
PLATELET # BLD AUTO: 269 K/UL
POTASSIUM SERPL-SCNC: 4.4 MMOL/L
PROT SERPL-MCNC: 6.8 G/DL
PT BLD: 11 SEC
RBC # BLD: 4.35 M/UL
RBC # FLD: 14.1 %
SODIUM SERPL-SCNC: 138 MMOL/L
WBC # FLD AUTO: 5.82 K/UL

## 2023-05-24 ENCOUNTER — NON-APPOINTMENT (OUTPATIENT)
Age: 63
End: 2023-05-24

## 2023-06-01 ENCOUNTER — APPOINTMENT (OUTPATIENT)
Dept: HEPATOLOGY | Facility: CLINIC | Age: 63
End: 2023-06-01
Payer: MEDICARE

## 2023-06-01 PROCEDURE — 76981 USE PARENCHYMA: CPT

## 2023-07-12 ENCOUNTER — APPOINTMENT (OUTPATIENT)
Dept: ENDOCRINOLOGY | Facility: CLINIC | Age: 63
End: 2023-07-12
Payer: MEDICARE

## 2023-07-12 VITALS
WEIGHT: 166 LBS | TEMPERATURE: 97.9 F | HEIGHT: 62 IN | SYSTOLIC BLOOD PRESSURE: 128 MMHG | DIASTOLIC BLOOD PRESSURE: 83 MMHG | HEART RATE: 69 BPM | OXYGEN SATURATION: 97 % | BODY MASS INDEX: 30.55 KG/M2

## 2023-07-12 DIAGNOSIS — E03.9 HYPOTHYROIDISM, UNSPECIFIED: ICD-10-CM

## 2023-07-12 DIAGNOSIS — E27.8 OTHER SPECIFIED DISORDERS OF ADRENAL GLAND: ICD-10-CM

## 2023-07-12 DIAGNOSIS — R23.3 SPONTANEOUS ECCHYMOSES: ICD-10-CM

## 2023-07-12 DIAGNOSIS — M81.0 AGE-RELATED OSTEOPOROSIS W/OUT CURRENT PATHOLOGICAL FRACTURE: ICD-10-CM

## 2023-07-12 DIAGNOSIS — R73.03 PREDIABETES.: ICD-10-CM

## 2023-07-12 PROCEDURE — 36415 COLL VENOUS BLD VENIPUNCTURE: CPT

## 2023-07-12 PROCEDURE — 99214 OFFICE O/P EST MOD 30 MIN: CPT | Mod: 25

## 2023-07-14 PROBLEM — E27.8 ADRENAL NODULE: Status: ACTIVE | Noted: 2022-05-12

## 2023-07-14 PROBLEM — E03.9 PRIMARY HYPOTHYROIDISM: Status: ACTIVE | Noted: 2022-05-12

## 2023-07-14 PROBLEM — M81.0 OSTEOPOROSIS, SENILE: Status: ACTIVE | Noted: 2022-05-12

## 2023-07-14 PROBLEM — R73.03 PRE-DIABETES: Status: ACTIVE | Noted: 2023-07-14

## 2023-07-14 PROBLEM — R23.3 EASY BRUISABILITY: Status: ACTIVE | Noted: 2023-07-14

## 2023-07-14 LAB
25(OH)D3 SERPL-MCNC: 22.1 NG/ML
ALBUMIN SERPL ELPH-MCNC: 4.6 G/DL
ALDOSTERONE SERUM: 10.9 NG/DL
ALP BLD-CCNC: 68 U/L
ALT SERPL-CCNC: 22 U/L
ANION GAP SERPL CALC-SCNC: 11 MMOL/L
AST SERPL-CCNC: 26 U/L
BILIRUB SERPL-MCNC: 0.6 MG/DL
BUN SERPL-MCNC: 7 MG/DL
CALCIUM SERPL-MCNC: 9.5 MG/DL
CHLORIDE SERPL-SCNC: 104 MMOL/L
CHOLEST SERPL-MCNC: 180 MG/DL
CO2 SERPL-SCNC: 26 MMOL/L
CORTIS SERPL-MCNC: 9.1 UG/DL
CREAT SERPL-MCNC: 0.64 MG/DL
CREAT SPEC-SCNC: 11 MG/DL
DHEA-S SERPL-MCNC: 3.8 UG/DL
EGFR: 99 ML/MIN/1.73M2
ESTIMATED AVERAGE GLUCOSE: 120 MG/DL
GLUCOSE SERPL-MCNC: 92 MG/DL
HBA1C MFR BLD HPLC: 5.8 %
HDLC SERPL-MCNC: 74 MG/DL
LDLC SERPL CALC-MCNC: 96 MG/DL
MICROALBUMIN 24H UR DL<=1MG/L-MCNC: <1.2 MG/DL
MICROALBUMIN/CREAT 24H UR-RTO: NORMAL MG/G
NONHDLC SERPL-MCNC: 106 MG/DL
POTASSIUM SERPL-SCNC: 4.3 MMOL/L
PROT SERPL-MCNC: 6.9 G/DL
SODIUM SERPL-SCNC: 140 MMOL/L
T4 FREE SERPL-MCNC: 1.3 NG/DL
TRIGL SERPL-MCNC: 51 MG/DL
TSH SERPL-ACNC: 1.57 UIU/ML

## 2023-07-14 NOTE — ASSESSMENT
[FreeTextEntry1] : This is a 62-year-old female with osteoporosis, primary hypothyroidism, hypertension, prediabetes, easy bruisability, right 2.9 cm adrenal nodule, DILI–AIH presumably due to atorvastatin versus de cory AIH, here for follow up.\par 1.  Osteoporosis\par DEXA scan from March 2022 showed L1-L2 T score -1.9, femoral neck -2.8.\par Denies a history of fragility fractures.\par Risk factors for osteoporosis include postmenopausal  female with history of tobacco use, prednisone use, PPI use.\par Check CMP and 25 vitamin D.\par She received Prolia on July 28, 2022, and February 1, 2023 without incident. If blood work normal and Prolia is covered, next dose August 2023. Check DEXA March 2024.\par She has dentures.\par 2.  Primary hypothyroidism\par Check TFTs.\par Thyroid antibodies negative.\par No thyroid nodules noted on thyroid ultrasound.\par 3.  Adrenal adenoma\par CT from March 2022 showed 2.9 cm right adrenal adenoma, stable compared to October 2021.\par Will check for hormonal hyperfunction.\par If CMP is normal will proceed with imaging. \par 4. Referred to rheumatology for generalized muscle pain and weakness. \par 5.  Easy bruisability\par Check 24-hour urine cortisol to evaluate for Cushing syndrome.  Declines dexamethasone suppression test\par

## 2023-07-14 NOTE — ADDENDUM
[FreeTextEntry1] : By signing my name below, I, Drew Braga, attest that this document has been prepared under the direction and in the presence of Dr. Cantu.\par \par I, Angie Cantu MD, personally performed the services described in this documentation. All medical record entries made by the scribe were at my discretion and in my presence. I have reviewed the chart and discharge instructions (if applicable) and agree that the record reflects my personal permanence and is accurate and complete.\par

## 2023-07-14 NOTE — REVIEW OF SYSTEMS
[All other systems negative] : All other systems negative [Joint Pain] : joint pain [Poor Balance] : poor balance [Recent Weight Loss (___ Lbs)] : recent weight loss: [unfilled] lbs [FreeTextEntry9] : Pain in upper back/neck, pain all over, weakness [de-identified] : easy bruisability  [de-identified] : Neuropathy in hands

## 2023-07-14 NOTE — HISTORY OF PRESENT ILLNESS
[FreeTextEntry1] : CC: Osteoporosis\par This is a 62-year-old female with osteoporosis, primary hypothyroidism, hypertension, prediabetes, easy bruisability, right 2.9 cm adrenal nodule, DILI–AIH presumably due to atorvastatin versus de cory AIH, here for follow up.\par DEXA scan from March 2022 showed L1-L2 T score -1.9, femoral neck -2.8.\par She received Prolia on July 28, 2022, and February 1, 2023 without incident.\par Denies a history of fragility fractures. She is not taking calcium or vitamin D supplements. \par Menarche was at the age of 9.\par Menopause was at the age of 46.\par She has been off prednisone since August 2022.  She is off pantoprazole.\par \par She states that in her 20s she developed hyperthyroidism and had partial thyroidectomy.\par Approximately 5 years ago she was diagnosed with hypothyroidism and is currently taking levothyroxine 25 mcg daily.\par \par CT from March 2022 showed 2.9 cm right adrenal adenoma, stable compared to October 2021.\par

## 2023-07-18 ENCOUNTER — NON-APPOINTMENT (OUTPATIENT)
Age: 63
End: 2023-07-18

## 2023-07-18 LAB
DOPAMINE UR-MCNC: <30 PG/ML
EPINEPH UR-MCNC: <15 PG/ML
NOREPINEPH UR-MCNC: 597 PG/ML

## 2023-07-19 RX ORDER — ERGOCALCIFEROL 1.25 MG/1
1.25 MG CAPSULE ORAL
Qty: 12 | Refills: 0 | Status: ACTIVE | COMMUNITY
Start: 2022-12-29 | End: 1900-01-01

## 2023-07-20 ENCOUNTER — RX RENEWAL (OUTPATIENT)
Age: 63
End: 2023-07-20

## 2023-07-25 LAB
METANEPHRINE, PL: 18.1 PG/ML
NORMETANEPHRINE, PL: 112.3 PG/ML
RENIN ACTIVITY, PLASMA: 1.13 NG/ML/HR

## 2023-08-14 LAB
CORTIS 24H UR-MCNC: 24 H
CORTIS 24H UR-MRATE: 17 MCG/24 H
SPECIMEN VOL 24H UR: 2400 ML

## 2023-08-15 ENCOUNTER — LABORATORY RESULT (OUTPATIENT)
Age: 63
End: 2023-08-15

## 2023-08-15 ENCOUNTER — APPOINTMENT (OUTPATIENT)
Dept: RHEUMATOLOGY | Facility: CLINIC | Age: 63
End: 2023-08-15
Payer: MEDICARE

## 2023-08-15 VITALS
WEIGHT: 164 LBS | OXYGEN SATURATION: 99 % | DIASTOLIC BLOOD PRESSURE: 75 MMHG | HEIGHT: 62 IN | BODY MASS INDEX: 30.18 KG/M2 | SYSTOLIC BLOOD PRESSURE: 124 MMHG | TEMPERATURE: 98.2 F | HEART RATE: 70 BPM

## 2023-08-15 DIAGNOSIS — M54.2 CERVICALGIA: ICD-10-CM

## 2023-08-15 PROCEDURE — 99204 OFFICE O/P NEW MOD 45 MIN: CPT

## 2023-08-15 RX ORDER — ERGOCALCIFEROL 1.25 MG/1
1.25 MG CAPSULE, LIQUID FILLED ORAL
Qty: 12 | Refills: 0 | Status: ACTIVE | COMMUNITY
Start: 2023-07-18 | End: 1900-01-01

## 2023-08-16 ENCOUNTER — NON-APPOINTMENT (OUTPATIENT)
Age: 63
End: 2023-08-16

## 2023-08-28 ENCOUNTER — APPOINTMENT (OUTPATIENT)
Dept: RADIOLOGY | Facility: IMAGING CENTER | Age: 63
End: 2023-08-28

## 2023-08-30 LAB
ALBUMIN SERPL ELPH-MCNC: 4.5 G/DL
ALP BLD-CCNC: 72 U/L
ALT SERPL-CCNC: 11 U/L
ANION GAP SERPL CALC-SCNC: 12 MMOL/L
AST SERPL-CCNC: 18 U/L
BASOPHILS # BLD AUTO: 0.06 K/UL
BASOPHILS NFR BLD AUTO: 1 %
BILIRUB SERPL-MCNC: 0.4 MG/DL
BUN SERPL-MCNC: 7 MG/DL
CALCIUM SERPL-MCNC: 9.6 MG/DL
CCP AB SER IA-ACNC: <8 UNITS
CHLORIDE SERPL-SCNC: 101 MMOL/L
CO2 SERPL-SCNC: 26 MMOL/L
CREAT SERPL-MCNC: 0.66 MG/DL
CRP SERPL-MCNC: 4 MG/L
EGFR: 99 ML/MIN/1.73M2
EOSINOPHIL # BLD AUTO: 0.05 K/UL
EOSINOPHIL NFR BLD AUTO: 0.8 %
ERYTHROCYTE [SEDIMENTATION RATE] IN BLOOD BY WESTERGREN METHOD: 13 MM/HR
GLUCOSE SERPL-MCNC: 105 MG/DL
HCT VFR BLD CALC: 42.5 %
HGB BLD-MCNC: 14 G/DL
IMM GRANULOCYTES NFR BLD AUTO: 0.3 %
LYMPHOCYTES # BLD AUTO: 1.32 K/UL
LYMPHOCYTES NFR BLD AUTO: 21.1 %
MAN DIFF?: NORMAL
MCHC RBC-ENTMCNC: 31.5 PG
MCHC RBC-ENTMCNC: 32.9 GM/DL
MCV RBC AUTO: 95.7 FL
MONOCYTES # BLD AUTO: 0.37 K/UL
MONOCYTES NFR BLD AUTO: 5.9 %
NEUTROPHILS # BLD AUTO: 4.43 K/UL
NEUTROPHILS NFR BLD AUTO: 70.9 %
PLATELET # BLD AUTO: 278 K/UL
POTASSIUM SERPL-SCNC: 4.6 MMOL/L
PROT SERPL-MCNC: 6.8 G/DL
RBC # BLD: 4.44 M/UL
RBC # FLD: 14.6 %
RF+CCP IGG SER-IMP: NEGATIVE
RHEUMATOID FACT SER QL: <10 IU/ML
SODIUM SERPL-SCNC: 139 MMOL/L
WBC # FLD AUTO: 6.25 K/UL

## 2023-08-30 NOTE — PRE-OP CHECKLIST - BP NONINVASIVE SYSTOLIC (MM HG)
Last office visit date: 7/11/23    Next appointment scheduled?: Yes     Number of refills given: 3    ARB Angiotension Receptor Blocker - Angiotension II Receptor Antagonist Refill Protocol - 12 Month Protocol Passed 08/30/2023 06:22 AM   Protocol Details  eGFR resulted within last 12 months -- IF CRITERIA FAILED REFER TO PROTOCOL DETAILS    Seen by prescribing provider or same department within the last 12 months or has a future appt in 3 months - IF FAILED PLEASE LOOK AT CHART REVIEW FOR LAST VISIT AND PROCEED ACCORDINGLY    Last BP was under 140/90 or if patient has diabetes, CAD, or PVD, BP under 130/80 in past year -- IF CRITERIA FAILED REFER TO PROTOCOL DETAILS    Normal Potassium within last 12 months looking at last value -- IF CRITERIA FAILED REFER TO PROTOCOL DETAILS    Normal eGFR within last 12 months looking at last value    Medication (including dose and sig) on current meds list        103 [id number]

## 2023-11-02 LAB
ALBUMIN SERPL ELPH-MCNC: 4.4 G/DL
ALP BLD-CCNC: 94 U/L
ALT SERPL-CCNC: 12 U/L
ANION GAP SERPL CALC-SCNC: 10 MMOL/L
AST SERPL-CCNC: 15 U/L
BILIRUB SERPL-MCNC: 0.4 MG/DL
BUN SERPL-MCNC: 5 MG/DL
CALCIUM SERPL-MCNC: 9.2 MG/DL
CHLORIDE SERPL-SCNC: 102 MMOL/L
CO2 SERPL-SCNC: 28 MMOL/L
CREAT SERPL-MCNC: 0.68 MG/DL
EGFR: 97 ML/MIN/1.73M2
GLUCOSE SERPL-MCNC: 91 MG/DL
INR PPP: 0.93 RATIO
POTASSIUM SERPL-SCNC: 4.7 MMOL/L
PROT SERPL-MCNC: 6.9 G/DL
PT BLD: 10.6 SEC
SODIUM SERPL-SCNC: 140 MMOL/L

## 2023-11-05 ENCOUNTER — LABORATORY RESULT (OUTPATIENT)
Age: 63
End: 2023-11-05

## 2023-11-06 ENCOUNTER — APPOINTMENT (OUTPATIENT)
Dept: DERMATOLOGY | Facility: CLINIC | Age: 63
End: 2023-11-06
Payer: MEDICARE

## 2023-11-06 DIAGNOSIS — D48.5 NEOPLASM OF UNCERTAIN BEHAVIOR OF SKIN: ICD-10-CM

## 2023-11-06 PROCEDURE — 11102 TANGNTL BX SKIN SINGLE LES: CPT | Mod: 59

## 2023-11-06 PROCEDURE — 17110 DESTRUCTION B9 LES UP TO 14: CPT

## 2023-11-06 PROCEDURE — 99203 OFFICE O/P NEW LOW 30 MIN: CPT | Mod: 25

## 2023-11-13 ENCOUNTER — NON-APPOINTMENT (OUTPATIENT)
Age: 63
End: 2023-11-13

## 2023-12-27 ENCOUNTER — APPOINTMENT (OUTPATIENT)
Dept: HEPATOLOGY | Facility: CLINIC | Age: 63
End: 2023-12-27
Payer: MEDICARE

## 2023-12-27 VITALS
TEMPERATURE: 96.7 F | RESPIRATION RATE: 15 BRPM | HEIGHT: 62 IN | BODY MASS INDEX: 27.97 KG/M2 | OXYGEN SATURATION: 98 % | HEART RATE: 75 BPM | WEIGHT: 152 LBS | SYSTOLIC BLOOD PRESSURE: 144 MMHG | DIASTOLIC BLOOD PRESSURE: 87 MMHG

## 2023-12-27 PROCEDURE — 99214 OFFICE O/P EST MOD 30 MIN: CPT

## 2023-12-27 NOTE — PHYSICAL EXAM
[General Appearance - Alert] : alert [General Appearance - In No Acute Distress] : in no acute distress [Outer Ear] : the ears and nose were normal in appearance [Oropharynx] : the oropharynx was normal [Neck Appearance] : the appearance of the neck was normal [Neck Cervical Mass (___cm)] : no neck mass was observed [Jugular Venous Distention Increased] : there was no jugular-venous distention [Thyroid Diffuse Enlargement] : the thyroid was not enlarged [Thyroid Nodule] : there were no palpable thyroid nodules [Auscultation Breath Sounds / Voice Sounds] : lungs were clear to auscultation bilaterally [Heart Rate And Rhythm] : heart rate was normal and rhythm regular [Heart Sounds] : normal S1 and S2 [Heart Sounds Gallop] : no gallops [Murmurs] : no murmurs [Heart Sounds Pericardial Friction Rub] : no pericardial rub [Full Pulse] : the pedal pulses are present [Edema] : there was no peripheral edema [Bowel Sounds] : normal bowel sounds [Abdomen Soft] : soft [Abdomen Tenderness] : non-tender [Abdomen Mass (___ Cm)] : no abdominal mass palpated [Cervical Lymph Nodes Enlarged Posterior Bilaterally] : posterior cervical [Cervical Lymph Nodes Enlarged Anterior Bilaterally] : anterior cervical [Supraclavicular Lymph Nodes Enlarged Bilaterally] : supraclavicular [Axillary Lymph Nodes Enlarged Bilaterally] : axillary [Femoral Lymph Nodes Enlarged Bilaterally] : femoral [Inguinal Lymph Nodes Enlarged Bilaterally] : inguinal [No CVA Tenderness] : no ~M costovertebral angle tenderness [No Spinal Tenderness] : no spinal tenderness [Skin Color & Pigmentation] : normal skin color and pigmentation [Skin Turgor] : normal skin turgor [] : no rash [Deep Tendon Reflexes (DTR)] : deep tendon reflexes were 2+ and symmetric [Sensation] : the sensory exam was normal to light touch and pinprick [No Focal Deficits] : no focal deficits [Oriented To Time, Place, And Person] : oriented to person, place, and time [Impaired Insight] : insight and judgment were intact [Affect] : the affect was normal [Scleral Icterus] : No Scleral Icterus [Spider Angioma] : No spider angioma(s) were observed [Abdominal  Ascites] : no ascites [Ascites Fluid Wave] : no ascites fluid wave [Ascites Tense] : ascites is not tense [Asterixis] : no asterixis observed [Jaundice] : No jaundice [Depression] : no depression

## 2023-12-27 NOTE — HISTORY OF PRESENT ILLNESS
[FreeTextEntry1] : Ms. RIC GARCIA a 62-year-old white female presented for a hepatology appointment. She had been a patient under the care of Dr. Bennie Serra in the past  The patient's past medical history is significant for the following conditions:  -Hypothyroidism -History of drug-induced liver injury-autoimmune hepatitis, presumed to be related to Lipitor (September 2021) -De cory autoimmune hepatitis with residual fibrosis based on FibroScan (F3 on Fibroscan, March 25, 2022) -Osteoporosis  The patient had been on Lipitor for many years and developed jaundice in September 2021. She was admitted to the hospital from September 24, 2021, to October 5, 2021, with severe mixed cholestatic hepatocellular liver injury, which was presumed to be either due to Lipitor or de cory autoimmune hepatitis. Additionally, she experienced acute liver failure during this period. Steroid treatment was initiated and gradually tapered off over time. Laboratory tests revealed a highly positive FARIDEH and a positive ASMA (1:40). The TPMT enzyme was found to be normal. Subsequently, the patient was switched to Imuran (azathioprine) and ursodiol. Currently, she continues to take azathioprine 50 mg daily but is no longer on ursodiol.  A FibroScan performed on March 25, 2022, showed a CAP score of 253 dB/m and a liver stiffness score of 12.7 kPa, indicating F3-bridging fibrosis and mild steatosis.  During the follow-up appointment on May 9, 2023, the patient reported overall improvement but mentioned persistent pain and tingling sensations in both upper and lower extremities. She had sought orthopedic care for this issue. In the past, she had been prescribed gabapentin for this condition but had run out of her prescription and now believes it to be a narcotic.     Dec 27, 2023: She returns for a follow up. She reports that she recently lost her son on Aug 24, 2023. Since than she lost all her appetite, got depressed, and stopped all  medications, including Azathioprine. Luckily her LFTs are normal. I have recommended her to stay off Azathioprine. She will follow up- with PCP for management of her other medical problems. Superficial basal cell carcinoma, extending to the deep biopsy margin, following with dermatology.

## 2023-12-27 NOTE — ASSESSMENT
[FreeTextEntry1] : The patient is a 62-year-old female with a medical history of hypothyroidism, drug-induced liver injury-autoimmune hepatitis (presumed to be related to Lipitor or de cory autoimmune hepatitis), F3 fibrosis on FibroScan from March 25, 2022, osteoporosis, and she is currently maintained on azathioprine 50 mg daily with normalized liver test results.  Assessment and Plan: I. Autoimmune Hepatitis (AIH) -The patient's liver enzymes have normalized, and she is currently doing well. -She has discontinued the use of steroids. -Stopped taking azathioprine 50 mg daily since Aug 24, 2023 after her son passed away. . -The FibroScan from March 25, 2022, showed a CAP score of 253, and liver stiffness score of 12.7 kPa, suggestive of stage 1 fibrosis (S1) and F3 fibrosis, which is concerning for residual fibrosis after recent acute liver failure (penitentiary). -Monitor with follow up labs  II. Hyperlipidemia (HLD) The patient's cardiologist, Dr. Yvan Bryson, is managing her hyperlipidemia. Currently, she is on Repatha for the management of her lipid levels.  III. Neuropathic Pain in Upper Extremities I have recommended that the patient see a neurologist for further evaluation and review of her symptoms. The orthopedic specialist also suggested a neurology consultation.  IV. Hypothyroidism - He will follow up with PCP. Stopped taking synthroid.   RTC in 6 months with fibroscan and repeat labs.

## 2023-12-28 LAB
ALBUMIN SERPL ELPH-MCNC: 4.3 G/DL
ALP BLD-CCNC: 102 U/L
ALT SERPL-CCNC: 15 U/L
ANION GAP SERPL CALC-SCNC: 13 MMOL/L
AST SERPL-CCNC: 18 U/L
BASOPHILS # BLD AUTO: 0.07 K/UL
BASOPHILS NFR BLD AUTO: 1 %
BILIRUB SERPL-MCNC: 0.3 MG/DL
BUN SERPL-MCNC: 4 MG/DL
CALCIUM SERPL-MCNC: 9.2 MG/DL
CHLORIDE SERPL-SCNC: 103 MMOL/L
CO2 SERPL-SCNC: 25 MMOL/L
CREAT SERPL-MCNC: 0.56 MG/DL
EGFR: 102 ML/MIN/1.73M2
EOSINOPHIL # BLD AUTO: 0.06 K/UL
EOSINOPHIL NFR BLD AUTO: 0.8 %
GLUCOSE SERPL-MCNC: 95 MG/DL
HCT VFR BLD CALC: 44.8 %
HGB BLD-MCNC: 14.2 G/DL
IGG SER QL IEP: 727 MG/DL
IMM GRANULOCYTES NFR BLD AUTO: 0.4 %
INR PPP: 0.86 RATIO
LYMPHOCYTES # BLD AUTO: 2.27 K/UL
LYMPHOCYTES NFR BLD AUTO: 30.8 %
MAN DIFF?: NORMAL
MCHC RBC-ENTMCNC: 30.3 PG
MCHC RBC-ENTMCNC: 31.7 GM/DL
MCV RBC AUTO: 95.7 FL
MONOCYTES # BLD AUTO: 0.38 K/UL
MONOCYTES NFR BLD AUTO: 5.2 %
NEUTROPHILS # BLD AUTO: 4.55 K/UL
NEUTROPHILS NFR BLD AUTO: 61.8 %
PLATELET # BLD AUTO: 296 K/UL
POTASSIUM SERPL-SCNC: 4.4 MMOL/L
PROT SERPL-MCNC: 6.9 G/DL
PT BLD: 9.9 SEC
RBC # BLD: 4.68 M/UL
RBC # FLD: 12.9 %
SODIUM SERPL-SCNC: 140 MMOL/L
WBC # FLD AUTO: 7.36 K/UL

## 2024-01-11 ENCOUNTER — APPOINTMENT (OUTPATIENT)
Dept: DERMATOLOGY | Facility: CLINIC | Age: 64
End: 2024-01-11
Payer: MEDICARE

## 2024-01-11 PROCEDURE — 17262 DSTRJ MAL LES T/A/L 1.1-2.0: CPT

## 2024-01-11 NOTE — PHYSICAL EXAM
[Alert] : alert [Oriented x 3] : ~L oriented x 3 [Well Nourished] : well nourished [Conjunctiva Non-injected] : conjunctiva non-injected [No Visual Lymphadenopathy] : no visual  lymphadenopathy [No Clubbing] : no clubbing [No Edema] : no edema [No Bromhidrosis] : no bromhidrosis [No Chromhidrosis] : no chromhidrosis [FreeTextEntry3] : well-healed bx site on the R ankle

## 2024-03-02 LAB
ALBUMIN SERPL ELPH-MCNC: 4.3 G/DL
ALP BLD-CCNC: 104 U/L
ALT SERPL-CCNC: 14 U/L
ANION GAP SERPL CALC-SCNC: 11 MMOL/L
AST SERPL-CCNC: 14 U/L
BASOPHILS # BLD AUTO: 0.04 K/UL
BASOPHILS NFR BLD AUTO: 0.8 %
BILIRUB SERPL-MCNC: 0.4 MG/DL
BUN SERPL-MCNC: 6 MG/DL
CALCIUM SERPL-MCNC: 9.1 MG/DL
CHLORIDE SERPL-SCNC: 101 MMOL/L
CO2 SERPL-SCNC: 26 MMOL/L
CREAT SERPL-MCNC: 0.7 MG/DL
EGFR: 97 ML/MIN/1.73M2
EOSINOPHIL # BLD AUTO: 0.08 K/UL
EOSINOPHIL NFR BLD AUTO: 1.5 %
GLUCOSE SERPL-MCNC: 101 MG/DL
HCT VFR BLD CALC: 43.7 %
HGB BLD-MCNC: 14 G/DL
IGG SER QL IEP: 732 MG/DL
IMM GRANULOCYTES NFR BLD AUTO: 0.2 %
INR PPP: 0.88 RATIO
LYMPHOCYTES # BLD AUTO: 1.53 K/UL
LYMPHOCYTES NFR BLD AUTO: 29.1 %
MAN DIFF?: NORMAL
MCHC RBC-ENTMCNC: 29.9 PG
MCHC RBC-ENTMCNC: 32 GM/DL
MCV RBC AUTO: 93.4 FL
MONOCYTES # BLD AUTO: 0.41 K/UL
MONOCYTES NFR BLD AUTO: 7.8 %
NEUTROPHILS # BLD AUTO: 3.18 K/UL
NEUTROPHILS NFR BLD AUTO: 60.6 %
PLATELET # BLD AUTO: 270 K/UL
POTASSIUM SERPL-SCNC: 4.9 MMOL/L
PROT SERPL-MCNC: 6.8 G/DL
PT BLD: 10.1 SEC
RBC # BLD: 4.68 M/UL
RBC # FLD: 13.4 %
SODIUM SERPL-SCNC: 139 MMOL/L
WBC # FLD AUTO: 5.25 K/UL

## 2024-05-13 ENCOUNTER — APPOINTMENT (OUTPATIENT)
Dept: DERMATOLOGY | Facility: CLINIC | Age: 64
End: 2024-05-13
Payer: MEDICARE

## 2024-05-13 DIAGNOSIS — L82.1 OTHER SEBORRHEIC KERATOSIS: ICD-10-CM

## 2024-05-13 DIAGNOSIS — C44.712 BASAL CELL CARCINOMA OF SKIN OF RIGHT LOWER LIMB, INCLUDING HIP: ICD-10-CM

## 2024-05-13 DIAGNOSIS — B07.9 VIRAL WART, UNSPECIFIED: ICD-10-CM

## 2024-05-13 DIAGNOSIS — D22.9 MELANOCYTIC NEVI, UNSPECIFIED: ICD-10-CM

## 2024-05-13 DIAGNOSIS — Z12.83 ENCOUNTER FOR SCREENING FOR MALIGNANT NEOPLASM OF SKIN: ICD-10-CM

## 2024-05-13 PROCEDURE — 17110 DESTRUCTION B9 LES UP TO 14: CPT

## 2024-05-13 PROCEDURE — 99214 OFFICE O/P EST MOD 30 MIN: CPT | Mod: 25

## 2024-05-13 NOTE — PHYSICAL EXAM
[Alert] : alert [Oriented x 3] : ~L oriented x 3 [Well Nourished] : well nourished [Conjunctiva Non-injected] : conjunctiva non-injected [No Visual Lymphadenopathy] : no visual  lymphadenopathy [No Clubbing] : no clubbing [No Edema] : no edema [No Bromhidrosis] : no bromhidrosis [No Chromhidrosis] : no chromhidrosis [FreeTextEntry3] : The patient is well-appearing, in no acute distress, alert and oriented x 3. Mood and affect are normal. A complete cutaneous examination of the scalp, face, neck, chest, abdomen, back, bilateral arms, bilateral legs, buttocks, digits, nails, eyelids, conjunctiva and lips reveals the following significant findings: -Tan to dark brown macules on the trunk and extremities with no concerning dermoscopic features  -Well-healed scar on the R ankle

## 2024-05-13 NOTE — HISTORY OF PRESENT ILLNESS
[FreeTextEntry1] : f/u BCC [de-identified] : 63F with a hx of BCC on the R lower leg s/p EDC (1/2024) here for f/u. No new spots

## 2024-05-21 ENCOUNTER — APPOINTMENT (OUTPATIENT)
Dept: DERMATOLOGY | Facility: CLINIC | Age: 64
End: 2024-05-21

## 2024-06-06 ENCOUNTER — NON-APPOINTMENT (OUTPATIENT)
Age: 64
End: 2024-06-06

## 2024-06-06 LAB
ALBUMIN SERPL ELPH-MCNC: 4.6 G/DL
ALP BLD-CCNC: 100 U/L
ALT SERPL-CCNC: 16 U/L
ANION GAP SERPL CALC-SCNC: 11 MMOL/L
AST SERPL-CCNC: 17 U/L
BASOPHILS # BLD AUTO: 0.06 K/UL
BASOPHILS NFR BLD AUTO: 0.8 %
BILIRUB SERPL-MCNC: 0.5 MG/DL
BUN SERPL-MCNC: 9 MG/DL
CALCIUM SERPL-MCNC: 9.5 MG/DL
CHLORIDE SERPL-SCNC: 103 MMOL/L
CO2 SERPL-SCNC: 24 MMOL/L
CREAT SERPL-MCNC: 0.65 MG/DL
EGFR: 98 ML/MIN/1.73M2
EOSINOPHIL # BLD AUTO: 0.06 K/UL
EOSINOPHIL NFR BLD AUTO: 0.8 %
GLUCOSE SERPL-MCNC: 93 MG/DL
HCT VFR BLD CALC: 43.5 %
HGB BLD-MCNC: 14.2 G/DL
IMM GRANULOCYTES NFR BLD AUTO: 0.7 %
LYMPHOCYTES # BLD AUTO: 1.79 K/UL
LYMPHOCYTES NFR BLD AUTO: 24.6 %
MAN DIFF?: NORMAL
MCHC RBC-ENTMCNC: 29.6 PG
MCHC RBC-ENTMCNC: 32.6 GM/DL
MCV RBC AUTO: 90.8 FL
MONOCYTES # BLD AUTO: 0.44 K/UL
MONOCYTES NFR BLD AUTO: 6.1 %
NEUTROPHILS # BLD AUTO: 4.87 K/UL
NEUTROPHILS NFR BLD AUTO: 67 %
PLATELET # BLD AUTO: 268 K/UL
POTASSIUM SERPL-SCNC: 4.5 MMOL/L
PROT SERPL-MCNC: 6.9 G/DL
RBC # BLD: 4.79 M/UL
RBC # FLD: 14 %
SODIUM SERPL-SCNC: 138 MMOL/L
WBC # FLD AUTO: 7.27 K/UL

## 2024-06-19 ENCOUNTER — APPOINTMENT (OUTPATIENT)
Dept: HEPATOLOGY | Facility: CLINIC | Age: 64
End: 2024-06-19

## 2024-06-19 DIAGNOSIS — K75.4 AUTOIMMUNE HEPATITIS: ICD-10-CM

## 2024-06-19 PROCEDURE — 99214 OFFICE O/P EST MOD 30 MIN: CPT

## 2024-06-19 PROCEDURE — 76981 USE PARENCHYMA: CPT

## 2024-06-21 PROBLEM — K75.4 AUTOIMMUNE HEPATITIS: Status: ACTIVE | Noted: 2021-10-08

## 2024-11-14 ENCOUNTER — APPOINTMENT (OUTPATIENT)
Dept: DERMATOLOGY | Facility: CLINIC | Age: 64
End: 2024-11-14
Payer: MEDICARE

## 2024-11-14 VITALS — HEIGHT: 61 IN | BODY MASS INDEX: 30.21 KG/M2 | WEIGHT: 160 LBS

## 2024-11-14 DIAGNOSIS — D22.9 MELANOCYTIC NEVI, UNSPECIFIED: ICD-10-CM

## 2024-11-14 DIAGNOSIS — L30.4 ERYTHEMA INTERTRIGO: ICD-10-CM

## 2024-11-14 DIAGNOSIS — Z12.83 ENCOUNTER FOR SCREENING FOR MALIGNANT NEOPLASM OF SKIN: ICD-10-CM

## 2024-11-14 DIAGNOSIS — L82.0 INFLAMED SEBORRHEIC KERATOSIS: ICD-10-CM

## 2024-11-14 DIAGNOSIS — L85.3 XEROSIS CUTIS: ICD-10-CM

## 2024-11-14 DIAGNOSIS — R22.9 LOCALIZED SWELLING, MASS AND LUMP, UNSPECIFIED: ICD-10-CM

## 2024-11-14 PROCEDURE — 99213 OFFICE O/P EST LOW 20 MIN: CPT | Mod: 25

## 2024-11-14 PROCEDURE — 17110 DESTRUCTION B9 LES UP TO 14: CPT

## 2024-11-14 RX ORDER — KETOCONAZOLE 20 MG/G
2 CREAM TOPICAL TWICE DAILY
Qty: 1 | Refills: 11 | Status: ACTIVE | COMMUNITY
Start: 2024-11-14 | End: 1900-01-01

## 2025-05-05 ENCOUNTER — APPOINTMENT (OUTPATIENT)
Dept: OTOLARYNGOLOGY | Facility: CLINIC | Age: 65
End: 2025-05-05

## 2025-05-15 ENCOUNTER — APPOINTMENT (OUTPATIENT)
Dept: DERMATOLOGY | Facility: CLINIC | Age: 65
End: 2025-05-15
Payer: MEDICARE

## 2025-05-15 DIAGNOSIS — L85.3 XEROSIS CUTIS: ICD-10-CM

## 2025-05-15 DIAGNOSIS — D22.9 MELANOCYTIC NEVI, UNSPECIFIED: ICD-10-CM

## 2025-05-15 DIAGNOSIS — C44.712 BASAL CELL CARCINOMA OF SKIN OF RIGHT LOWER LIMB, INCLUDING HIP: ICD-10-CM

## 2025-05-15 DIAGNOSIS — Z12.83 ENCOUNTER FOR SCREENING FOR MALIGNANT NEOPLASM OF SKIN: ICD-10-CM

## 2025-05-15 DIAGNOSIS — L20.9 ATOPIC DERMATITIS, UNSPECIFIED: ICD-10-CM

## 2025-05-15 DIAGNOSIS — L82.1 OTHER SEBORRHEIC KERATOSIS: ICD-10-CM

## 2025-05-15 PROCEDURE — 99214 OFFICE O/P EST MOD 30 MIN: CPT

## 2025-06-16 ENCOUNTER — NON-APPOINTMENT (OUTPATIENT)
Age: 65
End: 2025-06-16

## 2025-06-25 ENCOUNTER — APPOINTMENT (OUTPATIENT)
Dept: HEPATOLOGY | Facility: CLINIC | Age: 65
End: 2025-06-25
Payer: MEDICARE

## 2025-06-25 VITALS
HEIGHT: 61 IN | BODY MASS INDEX: 31.15 KG/M2 | HEART RATE: 64 BPM | OXYGEN SATURATION: 98 % | WEIGHT: 165 LBS | DIASTOLIC BLOOD PRESSURE: 82 MMHG | SYSTOLIC BLOOD PRESSURE: 134 MMHG

## 2025-06-25 PROCEDURE — 76981 USE PARENCHYMA: CPT

## 2025-06-25 PROCEDURE — 99214 OFFICE O/P EST MOD 30 MIN: CPT

## 2025-07-15 NOTE — PATIENT PROFILE ADULT - HISTORY OF COVID-19 VACCINATION
You should resume your previous diet unless otherwise instructed by your doctor. Do not drink alcoholic beverages for the next 24 hours. No